# Patient Record
Sex: FEMALE | Race: WHITE | NOT HISPANIC OR LATINO | Employment: OTHER | URBAN - NONMETROPOLITAN AREA
[De-identification: names, ages, dates, MRNs, and addresses within clinical notes are randomized per-mention and may not be internally consistent; named-entity substitution may affect disease eponyms.]

---

## 2017-01-28 ENCOUNTER — COMMUNICATION - GICH (OUTPATIENT)
Dept: FAMILY MEDICINE | Facility: OTHER | Age: 42
End: 2017-01-28

## 2017-01-28 DIAGNOSIS — J30.1 ALLERGIC RHINITIS DUE TO POLLEN: ICD-10-CM

## 2017-02-20 ENCOUNTER — COMMUNICATION - GICH (OUTPATIENT)
Dept: FAMILY MEDICINE | Facility: OTHER | Age: 42
End: 2017-02-20

## 2017-02-20 DIAGNOSIS — J30.1 ALLERGIC RHINITIS DUE TO POLLEN: ICD-10-CM

## 2017-02-21 ENCOUNTER — COMMUNICATION - GICH (OUTPATIENT)
Dept: FAMILY MEDICINE | Facility: OTHER | Age: 42
End: 2017-02-21

## 2017-02-21 DIAGNOSIS — J30.1 ALLERGIC RHINITIS DUE TO POLLEN: ICD-10-CM

## 2017-02-22 ENCOUNTER — COMMUNICATION - GICH (OUTPATIENT)
Dept: FAMILY MEDICINE | Facility: OTHER | Age: 42
End: 2017-02-22

## 2017-02-22 DIAGNOSIS — J30.1 ALLERGIC RHINITIS DUE TO POLLEN: ICD-10-CM

## 2017-04-20 ENCOUNTER — COMMUNICATION - GICH (OUTPATIENT)
Dept: FAMILY MEDICINE | Facility: OTHER | Age: 42
End: 2017-04-20

## 2017-04-20 DIAGNOSIS — F41.9 ANXIETY DISORDER: ICD-10-CM

## 2017-05-18 ENCOUNTER — COMMUNICATION - GICH (OUTPATIENT)
Dept: FAMILY MEDICINE | Facility: OTHER | Age: 42
End: 2017-05-18

## 2017-05-18 DIAGNOSIS — J30.1 ALLERGIC RHINITIS DUE TO POLLEN: ICD-10-CM

## 2017-05-23 ENCOUNTER — OFFICE VISIT - GICH (OUTPATIENT)
Dept: FAMILY MEDICINE | Facility: OTHER | Age: 42
End: 2017-05-23

## 2017-05-23 ENCOUNTER — HISTORY (OUTPATIENT)
Dept: FAMILY MEDICINE | Facility: OTHER | Age: 42
End: 2017-05-23

## 2017-05-23 DIAGNOSIS — F33.41 RECURRENT MAJOR DEPRESSIVE DISORDER IN PARTIAL REMISSION (H): ICD-10-CM

## 2017-05-23 DIAGNOSIS — M62.89 OTHER SPECIFIED DISORDERS OF MUSCLE (CODE): ICD-10-CM

## 2017-05-23 DIAGNOSIS — F41.9 ANXIETY DISORDER: ICD-10-CM

## 2017-05-23 DIAGNOSIS — F41.1 GENERALIZED ANXIETY DISORDER: ICD-10-CM

## 2017-05-23 DIAGNOSIS — K59.09 OTHER CONSTIPATION: ICD-10-CM

## 2017-05-23 DIAGNOSIS — Z00.00 ENCOUNTER FOR GENERAL ADULT MEDICAL EXAMINATION WITHOUT ABNORMAL FINDINGS: ICD-10-CM

## 2017-05-23 DIAGNOSIS — J30.1 ALLERGIC RHINITIS DUE TO POLLEN: ICD-10-CM

## 2017-05-23 ASSESSMENT — ANXIETY QUESTIONNAIRES
3. WORRYING TOO MUCH ABOUT DIFFERENT THINGS: NOT AT ALL
4. TROUBLE RELAXING: NOT AT ALL
5. BEING SO RESTLESS THAT IT IS HARD TO SIT STILL: NOT AT ALL
2. NOT BEING ABLE TO STOP OR CONTROL WORRYING: NOT AT ALL
7. FEELING AFRAID AS IF SOMETHING AWFUL MIGHT HAPPEN: NOT AT ALL
6. BECOMING EASILY ANNOYED OR IRRITABLE: SEVERAL DAYS
1. FEELING NERVOUS, ANXIOUS, OR ON EDGE: SEVERAL DAYS
GAD7 TOTAL SCORE: 2

## 2017-05-23 ASSESSMENT — PATIENT HEALTH QUESTIONNAIRE - PHQ9: SUM OF ALL RESPONSES TO PHQ QUESTIONS 1-9: 1

## 2017-08-04 ENCOUNTER — AMBULATORY - GICH (OUTPATIENT)
Dept: SCHEDULING | Facility: OTHER | Age: 42
End: 2017-08-04

## 2017-08-07 ENCOUNTER — COMMUNICATION - GICH (OUTPATIENT)
Dept: FAMILY MEDICINE | Facility: OTHER | Age: 42
End: 2017-08-07

## 2017-08-08 ENCOUNTER — AMBULATORY - GICH (OUTPATIENT)
Dept: FAMILY MEDICINE | Facility: OTHER | Age: 42
End: 2017-08-08

## 2017-08-09 ENCOUNTER — HISTORY (OUTPATIENT)
Dept: FAMILY MEDICINE | Facility: OTHER | Age: 42
End: 2017-08-09

## 2017-08-09 ENCOUNTER — OFFICE VISIT - GICH (OUTPATIENT)
Dept: FAMILY MEDICINE | Facility: OTHER | Age: 42
End: 2017-08-09

## 2017-08-09 DIAGNOSIS — M62.838 OTHER MUSCLE SPASM: ICD-10-CM

## 2017-08-09 DIAGNOSIS — N81.5 VAGINAL ENTEROCELE: ICD-10-CM

## 2017-08-09 DIAGNOSIS — R10.32 LEFT LOWER QUADRANT PAIN: ICD-10-CM

## 2017-08-09 DIAGNOSIS — N81.6 RECTOCELE: ICD-10-CM

## 2017-08-09 ASSESSMENT — ANXIETY QUESTIONNAIRES
7. FEELING AFRAID AS IF SOMETHING AWFUL MIGHT HAPPEN: NOT AT ALL
GAD7 TOTAL SCORE: 0
2. NOT BEING ABLE TO STOP OR CONTROL WORRYING: NOT AT ALL
3. WORRYING TOO MUCH ABOUT DIFFERENT THINGS: NOT AT ALL
5. BEING SO RESTLESS THAT IT IS HARD TO SIT STILL: NOT AT ALL
6. BECOMING EASILY ANNOYED OR IRRITABLE: NOT AT ALL
4. TROUBLE RELAXING: NOT AT ALL
1. FEELING NERVOUS, ANXIOUS, OR ON EDGE: NOT AT ALL

## 2017-08-16 ENCOUNTER — HOSPITAL ENCOUNTER (OUTPATIENT)
Dept: RADIOLOGY | Facility: OTHER | Age: 42
End: 2017-08-16
Attending: FAMILY MEDICINE

## 2017-08-16 DIAGNOSIS — R10.32 LEFT LOWER QUADRANT PAIN: ICD-10-CM

## 2017-08-22 ENCOUNTER — AMBULATORY - GICH (OUTPATIENT)
Dept: FAMILY MEDICINE | Facility: OTHER | Age: 42
End: 2017-08-22

## 2017-08-26 ENCOUNTER — HISTORY (OUTPATIENT)
Dept: EMERGENCY MEDICINE | Facility: OTHER | Age: 42
End: 2017-08-26

## 2017-08-30 ENCOUNTER — AMBULATORY - GICH (OUTPATIENT)
Dept: FAMILY MEDICINE | Facility: OTHER | Age: 42
End: 2017-08-30

## 2017-09-19 ENCOUNTER — OFFICE VISIT - GICH (OUTPATIENT)
Dept: OBGYN | Facility: OTHER | Age: 42
End: 2017-09-19

## 2017-09-19 ENCOUNTER — HISTORY (OUTPATIENT)
Dept: OBGYN | Facility: OTHER | Age: 42
End: 2017-09-19

## 2017-09-19 DIAGNOSIS — N81.5 VAGINAL ENTEROCELE: ICD-10-CM

## 2017-09-19 DIAGNOSIS — N81.6 RECTOCELE: ICD-10-CM

## 2017-10-23 ENCOUNTER — COMMUNICATION - GICH (OUTPATIENT)
Dept: FAMILY MEDICINE | Facility: OTHER | Age: 42
End: 2017-10-23

## 2017-10-23 ENCOUNTER — HISTORY (OUTPATIENT)
Dept: FAMILY MEDICINE | Facility: OTHER | Age: 42
End: 2017-10-23

## 2017-10-23 ENCOUNTER — OFFICE VISIT - GICH (OUTPATIENT)
Dept: FAMILY MEDICINE | Facility: OTHER | Age: 42
End: 2017-10-23

## 2017-10-23 DIAGNOSIS — N81.10 CYSTOCELE, UNSPECIFIED (CODE): ICD-10-CM

## 2017-10-23 DIAGNOSIS — J30.1 ALLERGIC RHINITIS DUE TO POLLEN: ICD-10-CM

## 2017-10-23 DIAGNOSIS — N81.6 RECTOCELE: ICD-10-CM

## 2017-10-23 DIAGNOSIS — Z23 ENCOUNTER FOR IMMUNIZATION: ICD-10-CM

## 2017-10-23 DIAGNOSIS — F17.200 NICOTINE DEPENDENCE, UNCOMPLICATED: ICD-10-CM

## 2017-10-23 DIAGNOSIS — F41.1 GENERALIZED ANXIETY DISORDER: ICD-10-CM

## 2017-10-23 DIAGNOSIS — Z01.818 ENCOUNTER FOR OTHER PREPROCEDURAL EXAMINATION: ICD-10-CM

## 2017-10-23 LAB
A/G RATIO - HISTORICAL: 1.3 (ref 1–2)
ABSOLUTE BASOPHILS - HISTORICAL: 0.1 THOU/CU MM
ABSOLUTE EOSINOPHILS - HISTORICAL: 0.1 THOU/CU MM
ABSOLUTE IMMATURE GRANULOCYTES(METAS,MYELOS,PROS) - HISTORICAL: 0 THOU/CU MM
ABSOLUTE LYMPHOCYTES - HISTORICAL: 2.9 THOU/CU MM (ref 0.9–2.9)
ABSOLUTE MONOCYTES - HISTORICAL: 0.4 THOU/CU MM
ABSOLUTE NEUTROPHILS - HISTORICAL: 3.5 THOU/CU MM (ref 1.7–7)
ALBUMIN SERPL-MCNC: 4.3 G/DL (ref 3.5–5.7)
ALP SERPL-CCNC: 68 IU/L (ref 34–104)
ALT (SGPT) - HISTORICAL: 20 IU/L (ref 7–52)
ANION GAP - HISTORICAL: 8 (ref 5–18)
AST SERPL-CCNC: 24 IU/L (ref 13–39)
BASOPHILS # BLD AUTO: 0.7 %
BILIRUB SERPL-MCNC: 0.3 MG/DL (ref 0.3–1)
BILIRUB UR QL: NEGATIVE
BUN SERPL-MCNC: 9 MG/DL (ref 7–25)
BUN/CREAT RATIO - HISTORICAL: 12
CALCIUM SERPL-MCNC: 9.5 MG/DL (ref 8.6–10.3)
CHLORIDE SERPLBLD-SCNC: 104 MMOL/L (ref 98–107)
CLARITY, URINE: CLEAR CLARITY
CO2 SERPL-SCNC: 25 MMOL/L (ref 21–31)
COLOR UR: YELLOW COLOR
CREAT SERPL-MCNC: 0.76 MG/DL (ref 0.7–1.3)
EOSINOPHIL NFR BLD AUTO: 1.7 %
ERYTHROCYTE [DISTWIDTH] IN BLOOD BY AUTOMATED COUNT: 11.9 % (ref 11.5–15.5)
GFR IF NOT AFRICAN AMERICAN - HISTORICAL: >60 ML/MIN/1.73M2
GLOBULIN - HISTORICAL: 3.3 G/DL (ref 2–3.7)
GLUCOSE SERPL-MCNC: 90 MG/DL (ref 70–105)
GLUCOSE URINE: NEGATIVE MG/DL
HCT VFR BLD AUTO: 37.8 % (ref 33–51)
HEMOGLOBIN: 12.8 G/DL (ref 12–16)
IMMATURE GRANULOCYTES(METAS,MYELOS,PROS) - HISTORICAL: 0.3 %
KETONES UR QL: NEGATIVE MG/DL
LEUKOCYTE ESTERASE URINE: NEGATIVE
LYMPHOCYTES NFR BLD AUTO: 41.6 % (ref 20–44)
MCH RBC QN AUTO: 32.6 PG (ref 26–34)
MCHC RBC AUTO-ENTMCNC: 33.9 G/DL (ref 32–36)
MCV RBC AUTO: 96 FL (ref 80–100)
MONOCYTES NFR BLD AUTO: 5.6 %
NEUTROPHILS NFR BLD AUTO: 50.1 % (ref 42–72)
NITRITE UR QL STRIP: NEGATIVE
OCCULT BLOOD,URINE - HISTORICAL: NEGATIVE
PH UR: 8 [PH]
PLATELET # BLD AUTO: 318 THOU/CU MM (ref 140–440)
PMV BLD: 9 FL (ref 6.5–11)
POTASSIUM SERPL-SCNC: 4.2 MMOL/L (ref 3.5–5.1)
PROT SERPL-MCNC: 7.6 G/DL (ref 6.4–8.9)
PROTEIN QUALITATIVE,URINE - HISTORICAL: NEGATIVE MG/DL
RED BLOOD COUNT - HISTORICAL: 3.93 MIL/CU MM (ref 4–5.2)
SODIUM SERPL-SCNC: 137 MMOL/L (ref 133–143)
SP GR UR STRIP: 1.01
UROBILINOGEN,QUALITATIVE - HISTORICAL: NORMAL EU/DL
WHITE BLOOD COUNT - HISTORICAL: 7 THOU/CU MM (ref 4.5–11)

## 2017-10-23 ASSESSMENT — PATIENT HEALTH QUESTIONNAIRE - PHQ9: SUM OF ALL RESPONSES TO PHQ QUESTIONS 1-9: 0

## 2017-10-25 ENCOUNTER — HISTORY (OUTPATIENT)
Dept: SURGERY | Facility: OTHER | Age: 42
End: 2017-10-25

## 2017-11-01 ENCOUNTER — HOSPITAL ENCOUNTER (OUTPATIENT)
Dept: MEDSURG UNIT | Facility: OTHER | Age: 42
Discharge: HOME | End: 2017-11-02
Attending: OBSTETRICS & GYNECOLOGY | Admitting: OBSTETRICS & GYNECOLOGY

## 2017-11-01 ENCOUNTER — HISTORY (OUTPATIENT)
Dept: SURGERY | Facility: OTHER | Age: 42
End: 2017-11-01

## 2017-11-01 ENCOUNTER — SURGERY (OUTPATIENT)
Dept: SURGERY | Facility: OTHER | Age: 42
End: 2017-11-01

## 2017-11-01 DIAGNOSIS — Z86.718 PERSONAL HISTORY OF OTHER VENOUS THROMBOSIS AND EMBOLISM (CODE): ICD-10-CM

## 2017-11-01 DIAGNOSIS — N81.89 OTHER FEMALE GENITAL PROLAPSE: ICD-10-CM

## 2017-11-01 LAB
ABORH - HISTORICAL: NORMAL
ANTIBODY SCREEN - HISTORICAL: NEGATIVE
HEMOGLOBIN: 12.2 G/DL (ref 12–16)
MCV RBC AUTO: 96 FL (ref 80–100)
SPECIMEN EXPIRATION DATE/TIME - HISTORICAL: NORMAL

## 2017-11-02 LAB
HEMOGLOBIN: 10 G/DL (ref 12–16)
MCV RBC AUTO: 97 FL (ref 80–100)

## 2017-11-18 ENCOUNTER — COMMUNICATION - GICH (OUTPATIENT)
Dept: FAMILY MEDICINE | Facility: OTHER | Age: 42
End: 2017-11-18

## 2017-11-18 DIAGNOSIS — J30.1 ALLERGIC RHINITIS DUE TO POLLEN: ICD-10-CM

## 2017-11-21 ENCOUNTER — OFFICE VISIT - GICH (OUTPATIENT)
Dept: OBGYN | Facility: OTHER | Age: 42
End: 2017-11-21

## 2017-11-21 ENCOUNTER — HISTORY (OUTPATIENT)
Dept: OBGYN | Facility: OTHER | Age: 42
End: 2017-11-21

## 2017-11-21 DIAGNOSIS — Z09 ENCOUNTER FOR FOLLOW-UP EXAMINATION AFTER COMPLETED TREATMENT FOR CONDITIONS OTHER THAN MALIGNANT NEOPLASM: ICD-10-CM

## 2017-12-19 ENCOUNTER — OFFICE VISIT - GICH (OUTPATIENT)
Dept: OBGYN | Facility: OTHER | Age: 42
End: 2017-12-19

## 2017-12-19 ENCOUNTER — HISTORY (OUTPATIENT)
Dept: OBGYN | Facility: OTHER | Age: 42
End: 2017-12-19

## 2017-12-19 DIAGNOSIS — Z09 ENCOUNTER FOR FOLLOW-UP EXAMINATION AFTER COMPLETED TREATMENT FOR CONDITIONS OTHER THAN MALIGNANT NEOPLASM: ICD-10-CM

## 2017-12-27 NOTE — PROGRESS NOTES
Patient Information     Patient Name MRN Sex Radha Ricci [id number] Female 1975      Progress Notes by Shaun Mc MD at 2017  3:30 PM     Author:  Shaun Mc MD Service:  (none) Author Type:  Physician     Filed:  2017  4:51 PM Encounter Date:  2017 Status:  Signed     :  Shaun Mc MD (Physician)            Radha Ruiz presents today for a postop checkup at 2 weeks after vaginal repairs/enterocoele.    S: Bowels and bladder are functioning normally, no abnormal bleeding or pain.     Current Outpatient Prescriptions on File Prior to Visit       Medication  Sig Dispense Refill     cetirizine-pseudoephedrine, 5-120 mg, (ZYRTEC-D) 5-120 mg tablet TAKE 1 TABLET BY MOUTH TWICE DAILY 48 tablet 5     enoxaparin (LOVENOX) 40 mg/0.4 mL injection Inject 40 mg subcutaneous once daily. 40 Syringe 0     fluticasone (50 mcg per actuation) nasal solution (FLONASE) INHALE 2 SPRAYS INTO BOTH NOSTRILS ONCE DAILY. 3 Bottle 11     ibuprofen (ADVIL; MOTRIN) 200 mg tablet Take 1-3 tablets by mouth every 6 hours if needed for Pain. 100 tablet 0     linaclotide (LINZESS) 145 mcg cap capsule Take 1 capsule by mouth before breakfast.  0     traZODone (DESYREL) 100 mg tablet TAKE 3-4 TABLETS BY MOUTH AT BEDTIME. 360 tablet 3     venlafaxine (EFFEXOR XR) 150 mg Extended-Release capsule Take 1 capsule by mouth once daily with a meal. 90 capsule 3     No current facility-administered medications on file prior to visit.      Allergies     Allergen  Reactions     Sulfa (Sulfonamide Antibiotics) Hives     Past Medical History:     Diagnosis  Date     History of blood transfusion 10/00    History of blood transfusion with       Hx of pregnancy      2, para 1-0-1-1      Pulmonary emboli (HC)     History of pulmonary emboli after       Routine gynecological examination 08    Satisfactory GYN examination       Past Surgical History:      Procedure  Laterality Date       SECTION  10/00 /06    History of blood transfusion with        COLONOSCOPY DIAGNOSTIC  ,,    F/U 2019       CRYOTHERAPY OF CERVIX  1996    Cryosurgery of the cervix        [facility]      Ney Hart MD [facility]       OR COMBO ANT/POST COLPORR W ENTEROCELE  2017            TUBAL LIGATION  2006       COMPLETE REVIEW OF SYSTEMS: see HPI        O: /68  Temp 98.5  F (36.9  C) (Tympanic)   Wt 73.5 kg (162 lb)  LMP 10/11/2012  BMI 29.63 kg/m2 Body mass index is 29.63 kg/(m^2).    EXAM:  General Appearance: Pleasant, alert, appropriate appearance for age. No acute distress      I/P:  Stable postop.  Recheck in one month.    Based on what occurred in the visit today:  Previous medication(s) were discontinued or altered? No  Previous medication(s) were suspended pending consultation? No  New medication(s) started? No        Shaun Mc MD FACOG  4:50 PM 2017

## 2017-12-27 NOTE — PROGRESS NOTES
Patient Information     Patient Name MRN Sex Radha Ricci [id number] Female 1975      Progress Notes by Zari Torres RT at 2017  3:02 PM     Author:  Zari Torres RT Service:  (none) Author Type:  RT- Respiratory Therapist     Filed:  2017  3:02 PM Date of Service:  2017  3:02 PM Status:  Signed     :  Zari Torres RT (RT- Respiratory Therapist)            Radha Ruiz WAS INSTRUCTED ON THE USE OF IS TODAY.  PATIENT ACHIEVED 1500 MLS OUT OF THE PREDICTED 2050 MLS BASED ON AGE AND HEIGHT.  PATIENT WILL USE IS, 10 BREATHS EVERY HOUR WHILE AWAKE FOLLOWED BY A STRONG COUGH TO KEEP LUNGS OPEN AND CLEAR WHILE HERE IN THE HOSPITAL.  PATIENT WAS ALSO INSTRUCTED TO SPLINT THEIR INCISION IF INDICATED.  PATIENT WILL CONTINUE TO USE IS UNTIL ABLE TO RESUME NORMAL DAILY ACTIVITIES.

## 2017-12-27 NOTE — PROGRESS NOTES
Patient Information     Patient Name MRN Sex     Radha Ruiz [id number] Female 1975      Progress Notes by Rashad Adhikari MD at 2017  4:00 PM     Author:  Rashad Adhikari MD Service:  (none) Author Type:  Physician     Filed:  2017  6:01 PM Encounter Date:  2017 Status:  Signed     :  Rashad Adhikari MD (Physician)            There are no exam notes on file for this visit.    SUBJECTIVE:  Radha Ruiz  is a 42 y.o. female who comes in today for follow-up after being seen at [facility]. We referred her because of ongoing troubles with stooling and pelvic floor dysfunction. We've not yet received the records from them. They talked about doing a trigger point injection with the guidance of radiology but they didn't want to stay there for that and talked about doing that here through [facility].    She has a significant enterocele and rectocele.  She was told she needs surgery.    Past Medical, Family, and Social History reviewed and updated as noted below.   ROS is negative except as noted above       Allergies     Allergen  Reactions     Sulfa (Sulfonamide Antibiotics) Hives   ,   Family History       Problem   Relation Age of Onset     Cancer-colon  Father 38     Colon cancer       Psychiatric illness  Mother      Untreated anxiety       Other  Mother      D&C for postmenopausal bleeding benign       Cancer  Maternal Grandfather      Brain       Heart Disease  Maternal Grandfather      MI       Hypertension  Maternal Grandfather      Other  Maternal Uncle      Hemochromatosis       Hypertension  Maternal Grandmother      Other  Maternal Grandmother      Alzheimer's       Diabetes  Other      Diabetes        Heart Disease  Paternal Grandmother      CHF       Cancer  Maternal Uncle      cancer all over.         Cancer  Paternal Uncle      Lung cancer       Cancer-breast  No Family History    ,   Current Outpatient Prescriptions on File Prior to Visit       Medication  Sig Dispense Refill      cetirizine-pseudoephedrine, 5-120 mg, (ZYRTEC-D) 5-120 mg tablet Take 1 tablet by mouth 2 times daily. 48 tablet 5     fluticasone (50 mcg per actuation) nasal solution (FLONASE) INHALE 2 SPRAYS INTO BOTH NOSTRILS ONCE DAILY. 3 Bottle 11     traZODone (DESYREL) 100 mg tablet TAKE 3-4 TABLETS BY MOUTH AT BEDTIME. 360 tablet 3     venlafaxine (EFFEXOR XR) 150 mg Extended-Release capsule Take 1 capsule by mouth once daily with a meal. 90 capsule 3     No current facility-administered medications on file prior to visit.    ,   Past Medical History:     Diagnosis  Date     History of blood transfusion 10/00    History of blood transfusion with       Hx of pregnancy      2, para 1-0-1-1      Pulmonary emboli (HC)     History of pulmonary emboli after       Routine gynecological examination 08    Satisfactory GYN examination     ,   Patient Active Problem List       Diagnosis  Date Noted     Surgical menopause  2015     Melanosis coli  10/06/2014     H/O adenomatous polyp of colon  10/06/2014     FH: colon cancer  2014     Menorrhagia  10/17/2012     TINEA VERSICOLOR  2012     CONSTIPATION       CIGARETTE SMOKER       ANXIETY       ALLERGIC RHINITIS, SEASONAL       MIGRAINE HEADACHE       RESTLESS LEG SYNDROME       PREMENSTRUAL DYSPHORIC SYNDROME       Depression, major, recurrent, in partial remission (HC)  2007     PHQ-9 score 19-3 on 07.        ,   Past Surgical History:      Procedure  Laterality Date      SECTION  10/00    History of blood transfusion with        COLONOSCOPY DIAGNOSTIC  ,,    F/U 2019       CRYOTHERAPY OF CERVIX      Cryosurgery of the cervix        [facility]      [facility]       TUBAL LIGATION  2006    and   Social History        Substance Use Topics          Smoking status:   Current Every Day Smoker      Packs/day:  0.13      Types:  Cigarettes      Smokeless tobacco:   Never Used       "Alcohol use   Yes      Comment: very rare       OBJECTIVE:  /86  Pulse 94  Ht 1.549 m (5' 1\")  Wt 69.9 kg (154 lb)  LMP 10/11/2012  Breastfeeding? No  BMI 29.1 kg/m2   EXAM:  Alert and cooperative, no distress. Exam was not repeated today. She did show me her records from [facility] as well as some of the photos of her MRI study. She has a tender trigger point on her left lower quadrant.  ASSESSMENT/Plan :      Radha was seen today for referral.    Diagnoses and all orders for this visit:    Acquired pelvic enterocele  -     AMB CONSULT TO GYNECOLOGY; Future    Rectocele  -     AMB CONSULT TO GYNECOLOGY; Future    Abdominal wall pain in left lower quadrant  -     XR INJ TRIGGER POINT EQUAL OR LESS 2; Future    Muscle spasm  -     tiZANidine (ZANAFLEX) 4 mg tablet; Take 1 tablet by mouth every 6 hours if needed for Muscle Spasm.      referred for trigger point injection under imaging guidance of the left lower quadrant abdominal tender spot.    She would like to see Dr. Ney Hart for gynecology at [facility] who did her hysterectomy with regard to possible enterocele/rectocele repair.    Trial of Zanaflex to see if that will help with some of her muscle spasm and help her to be able to sleep.    A total of 25 minutes was spent with the patient, greater than 50% of the time was spent in counseling/discussion of the aforementioned concerns.     Rashad Adhikari MD            "

## 2017-12-27 NOTE — PROGRESS NOTES
Patient Information     Patient Name MRN Sex Radha Brantley [id number] Female 1975      Progress Notes by Shaun Mc MD at 2017  2:45 PM     Author:  Shaun Mc MD Service:  (none) Author Type:  Physician     Filed:  2017 10:01 PM Encounter Date:  2017 Status:  Signed     :  Shaun Mc MD (Physician)            SUBJECTIVE:    Radha Ruiz is a 42 y.o. female who presents for evaluation of pelvic relaxation.    HPI  Review of her medical and personal history reveals longstanding history of left pelvic and abdominal pain, and chronic issues with constipation. She recently underwent workup at [facility] in [place] with defecatory MRI and diagnosed with enterocele and rectocele, and suggested she be seen for evaluation and consideration of repair by GYN or Colorectal surgery.  She has problems getting empty both with defecation and urination. Denies leakage of either. Her primary concern is her abdominal pain. She has already tried acupuncture and chiropractic without relief. She denies a pelvic bulge or painful intercourse. She is present with her  today as well. She had a hysterectomy and BSO for pelvic pain in [place] in . This was done robotically with closure of her vaginal cuff vaginally. SHe had history of PE with one of her deliveries. She had two cesareans in the past.    Allergies     Allergen  Reactions     Sulfa (Sulfonamide Antibiotics) Hives   ,   Family History       Problem   Relation Age of Onset     Cancer-colon  Father 38     Colon cancer       Psychiatric illness  Mother      Untreated anxiety       Other  Mother      D&C for postmenopausal bleeding benign       Cancer  Maternal Grandfather      Brain       Heart Disease  Maternal Grandfather      MI       Hypertension  Maternal Grandfather      Other  Maternal Uncle      Hemochromatosis       Hypertension  Maternal Grandmother      Other  Maternal Grandmother      Alzheimer's        Diabetes  Other      Diabetes        Heart Disease  Paternal Grandmother      CHF       Cancer  Maternal Uncle      cancer all over.         Cancer  Paternal Uncle      Lung cancer       Cancer-breast  No Family History    ,   Current Outpatient Prescriptions on File Prior to Visit       Medication  Sig Dispense Refill     cetirizine-pseudoephedrine, 5-120 mg, (ZYRTEC-D) 5-120 mg tablet Take 1 tablet by mouth 2 times daily. 48 tablet 5     fluticasone (50 mcg per actuation) nasal solution (FLONASE) INHALE 2 SPRAYS INTO BOTH NOSTRILS ONCE DAILY. 3 Bottle 11     linaclotide (LINZESS) 145 mcg cap capsule Take 1 capsule by mouth before breakfast.  0     methocarbamol (ROBAXIN) 500 mg tablet Take 1-2 tablets by mouth every 6 hours if needed (For Muscle Spasms). 60 tablet 1     tiZANidine (ZANAFLEX) 4 mg tablet Take 1 tablet by mouth every 6 hours if needed for Muscle Spasm. 30 tablet 1     traZODone (DESYREL) 100 mg tablet TAKE 3-4 TABLETS BY MOUTH AT BEDTIME. 360 tablet 3     venlafaxine (EFFEXOR XR) 150 mg Extended-Release capsule Take 1 capsule by mouth once daily with a meal. 90 capsule 3     No current facility-administered medications on file prior to visit.    ,   Past Medical History:     Diagnosis  Date     History of blood transfusion 10/00    History of blood transfusion with       Hx of pregnancy      2, para 1-0-1-1      Pulmonary emboli (HC)     History of pulmonary emboli after       Routine gynecological examination 08    Satisfactory GYN examination     ,   Past Surgical History:      Procedure  Laterality Date      SECTION  10/00    History of blood transfusion with        COLONOSCOPY DIAGNOSTIC  ,,    F/U 2019       CRYOTHERAPY OF CERVIX  1996    Cryosurgery of the cervix        [facility]      [facility]       TUBAL LIGATION  2006    and   Social History        Substance Use Topics          Smoking status:   Current Every Day  "Smoker      Packs/day:  0.13      Types:  Cigarettes      Smokeless tobacco:   Never Used      Alcohol use   Yes      Comment: very rare         REVIEW OF SYSTEMS:  Review of Systems   All other systems reviewed and are negative.      OBJECTIVE:  /82  Pulse 82  Ht 1.549 m (5' 1\")  Wt 72.8 kg (160 lb 9.6 oz)  LMP 10/11/2012  Breastfeeding? No  BMI 30.35 kg/m2    EXAM:   Physical Exam   Constitutional: She is well-developed, well-nourished, and in no distress.   Abdominal: Soft. She exhibits no distension. There is no tenderness.   Genitourinary:   Genitourinary Comments: She has a well supported vaginal cuff.  She does have bulging of the perineum with valsalva, G1-2 cystocele and rectocele with valsalva.  Bimanual is non-tender and negative for masses.       ASSESSMENT/PLAN:    ICD-10-CM    1. Acquired pelvic enterocele N81.5 AMB CONSULT TO GYNECOLOGY   2. Rectocele N81.6 AMB CONSULT TO GYNECOLOGY        Plan:  Cystocele and Rectocele.  Discussed reasonable expectations of easier evacuation of bowels, but not necessarily fixing her pain symptoms or bowel mobility with surgical repair.  Will need to address the perineal enterocele with endopelvic obliteration of the cul-de-sac with rectocele repair.  Discussed risks, benefits and alternatives. Discussed referral [facility] at [facility], also discussed trial of pessary.  She discussed with her  and would like to proceed with vaginal repairs primarily here. Will refer to Dr. Adhikari for preop evaluation with her history of PE. Plan for prophylactic lovenox prior to her procedure and six weeks following. WIll plan on GETA without spinal.    TT: 30 min with over half in discussion of her treatment options.    Shaun Mc MD FACOG  10:00 PM 9/19/2017           "

## 2017-12-27 NOTE — PROGRESS NOTES
Patient Information     Patient Name MRN Sex Radha Ricci [id number] Female 1975      Progress Notes by Kerry Shaikh RN at 2017 12:04 PM     Author:  Kerry Shaikh RN Service:  (none) Author Type:  NURS- Registered Nurse     Filed:  2017 12:06 PM Date of Service:  2017 12:04 PM Status:  Signed     :  Kerry Shaikh RN (NURS- Registered Nurse)            Discharge Note    Data:  Radha Ruiz has been discharged home at 1130 via ambulatory accompanied by Registered Nurse and Family.      Action:  Written discharge/follow-up instructions were provided to patient. Prescriptions were written and sent with patient and were sent to patients pharmacy.  Belongings sent with patient. Medications from home sent with patient/family: Not Applicable  Equipment none .     Response:  Patient verbalized understanding of discharge instructions, reason for discharge, and necessary follow-up appointments.

## 2017-12-28 NOTE — PROCEDURES
Patient Information     Patient Name MRN Sex Radha Ricci [id number] Female 1975      Procedures by Shaun Mc MD at 2017  9:18 AM     Author:  Shaun Mc MD Service:  (none) Author Type:  Physician     Filed:  2017  9:24 AM Date of Service:  2017  9:18 AM Status:  Signed     :  Shaun Mc MD (Physician)        Pre-procedure Diagnoses:    1. Pelvic relaxation [N81.89]           Procedures:    1. MO COMBO ANT/POST COLPORR W ENTEROCELE [43715.0]               Gynecological Procedure Note  Preoperative Diagnosis:  Enterocele, cystocele, rectocele  Postoperative Diagnosis:  Same  Procedure:  Vaginal repair of enterocele, cystocele, rectocele  Surgeon:  Jesusita  1st Assist: Dr. Hart who was requested due to inavailability of another qualified assistant for help with hemostasis and visualization  Anesthesia:  General Block  Findings:  Vaginal vault prolapse, normal ureteral orifices and bladder post repair.   Description of Operative Procedure: After consent was obtained the patient was taken to the OR suite and placed under general anesthetic. She was prepped and draped in sterile fashion in dorsal lithotomy position in candy cane stirrups. After appropriate timeout procedure, a weighted speculum was placed in the posterior vaginal fornix. Right angle retractors were placed in the anterior and lateral vaginal fornices. The vaginal cuff was grasped with Allis clamps and injected with 0.5% marcaine with dilute epinephrine circumfrentially. The vaginal mucosa was sharply incised. The anterior vesicopubic fascia was sharply dissected, and the peritoneum identified and divided. Intraabdominal position was confirmed. A pack was placed in the abdomen. The enterocele sac was identified and excised with sharp dissection and cautery. Two internal and an external Lees's culdeplasty stitch was placed plicating the posterior peritoneum and both uterosacral ligaments with the free  ends being brought out through the vaginal mucosa.  The pack was removed.  The vaginal cuff was reapproximated with O Vicryl in an interrupted fashion. Hemostasis was excellent.    We then proceeded with anterior and posterior repairs. The mucosa was injected with 0.5% marcaine with dilute epinephrine. The vaginal mucosa was sharply dissected off of the underlying fascia anteriorly. The cystocele was plicated with 2-0 Vicryl. The excess vaginal mucosa was trimmed and the vaginal mucosa was closed in an interrupted fashion. The posterior mucosa was in like fashion dissected off the underlying endopelvic fascia. The mucosa was trimmed and closed with interrupted 2-0 vicryl plicating the rectocele and closing the vaginal mucosa. Cystoscopy was performed and both ureteral orifices were seen effluxing urine. A Saenz catheter was placed in the urethra with clear urine returning. A vaginal pack was placed. The patient was returned to the supine position and awakened from her anesthetic. She was taken to the PACU in stable condition. There were no complications.   EBL: 100 ml.   Specimen: None    Shaun Mc MD FACOG  9:22 AM 11/1/2017

## 2017-12-28 NOTE — PROGRESS NOTES
Patient Information     Patient Name MRN Sex Radha Ricci [id number] Female 1975      Progress Notes by Haylie Mott RN at 2017 11:20 AM     Author:  Haylie Mott RN Service:  (none) Author Type:  NURS- Registered Nurse     Filed:  2017 12:51 PM Date of Service:  2017 11:20 AM Status:  Signed     :  Haylie Mott RN (NURS- Registered Nurse)            Admission Note    Data:  Radha Ruiz admitted to [facility] room 333 from PACU via cart.      Action:  Family and Dr. Mc have been notified of admission.      Response:  Patient tolerated transfer. and Patient is stable.     Haylie Mott RN ....................  2017   1120 PM

## 2017-12-28 NOTE — PROGRESS NOTES
Patient Information     Patient Name MRN Sex Radha Ricci [id number] Female 1975      Progress Notes by Rukhsana Hoffman RN at 2017  4:43 AM     Author:  Rukhsana Hoffman RN Service:  (none) Author Type:  NURS- Registered Nurse     Filed:  2017  4:48 AM Date of Service:  2017  4:43 AM Status:  Signed     :  Rukhsana Hoffman RN (NURS- Registered Nurse)            Problem: REPRODUCTIVE  Goal: UNDERSTANDING OF REPRODUCTIVE IMPLICATIONS OF DIAGNOSIS OR SURGERY  Outcome: Problem reviewed and goal still appropriate  Small amount of pink discharge on pratik pad this shift.     Rukhsana Hoffman RN ....................  2017   4:41 AM      Problem: PAIN  Goal: VERBALIZES/DISPLAYS ADEQUATE COMFORT LEVEL OR BASELINE COMFORT LEVEL  Outcome: Problem reviewed and goal still appropriate  Patient receiving PRN medications for pain management this shift. Pain rated between 4-8/10 on pain scale at times. Now resting comfortably.    Rukhsana Hoffman RN ....................  2017   4:43 AM        Comments: Called Dr. Mc at 2000 to order home medications per patient request. Received order for trazodone 150 mg at bedtime, effexor 150 mg at HS, and zyrtec HS and in the morning.     Rukhsana Hoffman RN ....................  2017   4:47 AM

## 2017-12-28 NOTE — TELEPHONE ENCOUNTER
Patient Information     Patient Name MRN Radha Reich [id number] Female 1975      Telephone Encounter by Leydi Smart at 2017 11:45 AM     Author:  Leydi Smart Service:  (none) Author Type:  (none)     Filed:  2017 11:45 AM Encounter Date:  2017 Status:  Signed     :  Leydi Smart            The patient has an appointment today with Rashad Adhikari MD.  Leydi Smart LPN..................2017   11:45 AM

## 2017-12-28 NOTE — OR POSTOP
"Patient Information     Patient Name MRN Sex Radha Ricci [id number] Female 1975      OR PostOp by Brijesh Chaudhry RN at 2017 11:01 AM     Author:  Brijesh Chaudhry RN Service:  (none) Author Type:  NURS- Registered Nurse     Filed:  2017 11:02 AM Date of Service:  2017 11:01 AM Status:  Signed     :  Brijesh Chaudhry RN (NURS- Registered Nurse)            PACU Respiratory Event Documentation     1) Episodes of Apnea greater than or equal to 10 seconds: no    2) Bradypnea - less than 8 breaths per minute: 16    3) Pain score on 0 to 10 scale: 5 \"tolerable\"    4) Pain-sedation mismatch (yes or no): no    5) Repeated 02 desaturation less than 90% (yes or no): no    Anesthesia notified? (yes or no): no    Any of the above events occuring repeatedly in separate 30 minute intervals may be considered recurrent PACU respiratory events.    PACU Transfer Note    Radha Ruiz transferred to Med/Surg room 333 via cart.  Equipment used for transport:  None.  Accompanied by:  Registered Nurse    Patient stable and meets phase 1 discharge criteria for transport from PACU.  Report given to MAURICIO Stallings prior to transport to the unit. BRIJESH CHAUDHRY RN ....................  2017   11:02 AM          "

## 2017-12-28 NOTE — OR ANESTHESIA
Patient Information     Patient Name MRN Sex     Radha Ruiz [id number] Female 1975      OR Anesthesia by Derrick Cedillo DO at 2017  7:16 AM     Author:  Derrick Cedillo DO Service:  (none) Author Type:  PHYS- Anesthesiologist     Filed:  2017  7:16 AM Date of Service:  2017  7:16 AM Status:  Signed     :  Derrick Cedillo DO (PHYS- Anesthesiologist)            ANESTHESIAPREOP    PREANESTHETIC EXAM    Radha Ruiz is a 42 y.o. female    /57  Pulse 76  Temp 98.2  F (36.8  C)  Resp 16  LMP 10/11/2012  SpO2 98%  There is no height or weight on file to calculate BMI.    ALLERGIES    Sulfa (sulfonamide antibiotics)    PAST MEDICAL HISTORY    Past Medical History:     Diagnosis  Date     History of blood transfusion 10/00    History of blood transfusion with       Hx of pregnancy      2, para 1-0-1-1      Pulmonary emboli (HC)     History of pulmonary emboli after       Routine gynecological examination 08    Satisfactory GYN examination         Patient Active Problem List     Diagnosis  Code     CIGARETTE SMOKER F17.200     ANXIETY F41.1     ALLERGIC RHINITIS, SEASONAL J30.1     MIGRAINE HEADACHE G43.909     RESTLESS LEG SYNDROME G25.81     Depression, major, recurrent, in partial remission (HC) F33.41     CONSTIPATION K59.00     TINEA VERSICOLOR B36.0     FH: colon cancer Z80.0     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     H/O adenomatous polyp of colon Z86.010     Surgical menopause E89.40       Family History       Problem   Relation Age of Onset     Cancer-colon  Father 38     Colon cancer       Psychiatric illness  Mother      Untreated anxiety       Other  Mother      D&C for postmenopausal bleeding benign       Cancer  Maternal Grandfather      Brain       Heart Disease  Maternal Grandfather      MI       Hypertension  Maternal Grandfather      Other  Maternal Uncle      Hemochromatosis       Hypertension  Maternal Grandmother      Other  Maternal  Grandmother      Alzheimer's       Diabetes  Other      Diabetes        Heart Disease  Paternal Grandmother      CHF       Cancer  Maternal Uncle      cancer all over.         Cancer  Paternal Uncle      Lung cancer       Cancer-breast  No Family History        Past Surgical History:      Procedure  Laterality Date      SECTION  10/00 /4/18/06    History of blood transfusion with        COLONOSCOPY DIAGNOSTIC  ,,    F/U 2019       CRYOTHERAPY OF CERVIX  1996    Cryosurgery of the cervix        [facility]      Ney Hart MD [facility]       TUBAL LIGATION  2006       Major Anesthetic Reactions: none    PMH/PSH Reviewed    History     Smoking Status       Current Every Day Smoker      Packs/day: 0.13     Types: Cigarettes   Smokeless Tobacco       Never Used      History     Alcohol Use       Yes      Comment: very rare       Medications have been reviewed in coordination with proposed intra-procedure medications.    Prescriptions Prior to Admission       Medication  Sig Dispense Refill     cetirizine-pseudoephedrine, 5-120 mg, (ZYRTEC-D) 5-120 mg tablet Take 1 tablet by mouth 2 times daily. 48 tablet 5     fluticasone (50 mcg per actuation) nasal solution (FLONASE) INHALE 2 SPRAYS INTO BOTH NOSTRILS ONCE DAILY. 3 Bottle 11     linaclotide (LINZESS) 145 mcg cap capsule Take 1 capsule by mouth before breakfast.  0     traZODone (DESYREL) 100 mg tablet TAKE 3-4 TABLETS BY MOUTH AT BEDTIME. 360 tablet 3     venlafaxine (EFFEXOR XR) 150 mg Extended-Release capsule Take 1 capsule by mouth once daily with a meal. 90 capsule 3       Recent Labs  Results for orders placed or performed during the hospital encounter of 17       Hemoglobin       Result  Value Ref Range Status    HEMOGLOBIN                12.2 12.0 - 16.0 g/dL Final    MCV                       96 80 - 100 fL Final       NPO Status Noted:  Yes    Airway Class:  2    ASA Physical Status: 2    Anesthetic Plan: GA/ ETT    The  risks, benefits, and alternatives of the procedure were discussed.    PHYSICIAN ELECTRONIC SIGNATURE  Miguel Cedillo DO

## 2017-12-28 NOTE — PROGRESS NOTES
"Patient Information     Patient Name MRN Sex Radha Ricci [id number] Female 1975      Progress Notes by Shaun Mc MD at 2017  7:55 AM     Author:  Shaun Mc MD Service:  (none) Author Type:  Physician     Filed:  2017  7:57 AM Date of Service:  2017  7:55 AM Status:  Signed     :  Shaun cM MD (Physician)            PROGRESS NOTE    SUBJECTIVE:  Pain and PO OK, Vag pack out this AM and now dry on her pad. Saenz is in. Good UOP    OBJECTIVE:  /55  Pulse 77  Temp 97.9  F (36.6  C)  Resp 16  Ht 1.575 m (5' 2\")  Wt 74.1 kg (163 lb 5.8 oz)  LMP 10/11/2012  SpO2 100%  BMI 29.88 kg/m2    Temp (24hrs), Av.4  F (36.3  C), Min:95.5  F (35.3  C), Max:98.5  F (36.9  C)      Patient Vitals for the past 72 hrs:   Weight   17 1124 74.1 kg (163 lb 5.8 oz)     Intake/Output Summary (Last 24 hours) at 17 0756  Last data filed at 17 0600   Gross per 24 hour   Intake             5043 ml   Output             5250 ml   Net             -207 ml       PHYSICAL EXAM:  NAD  Abdomen: NT, ND, soft.  Recent Labs       10/23/17   1620   SODIUM  137   POTASSIUM  4.2   CHLORIDE  104   IK8GYYOA  25   BUN  9   CREATININE  0.76   CALCIUM  9.5     Recent Labs         17   0444  17   0700  10/23/17   1620   ALKPHOSPH   --    --   68   WBC   --    --   7.0   HGB  10.0 L  12.2  12.8   PLT   --    --   318     Active Problems:    Pelvic relaxation    ASSESSMENT & PLAN: Stable  Saenz out, ambulate, adat.  Home if able to void. Check post void residual four hours after removal of cath and call MD with results.    Shaun Mc MD FACOG  7:57 AM 2017           "

## 2017-12-28 NOTE — TELEPHONE ENCOUNTER
Patient Information     Patient Name MRN Sex Radha Ricci [id number] Female 1975      Telephone Encounter by Vance Boyd at 2017  8:32 AM     Author:  Vance Boyd Service:  (none) Author Type:  (none)     Filed:  2017  8:33 AM Encounter Date:  2017 Status:  Signed     :  Vance Boyd            JVC-Pt called and stated she needed to be seen for a couple of medical conditions, he would know. Stated needs to come in sooner than the end of August. Please call pt and advise.   Thank you,  Vance Boyd ....................  2017   8:33 AM

## 2017-12-28 NOTE — OR ANESTHESIA
Patient Information     Patient Name MRN Sex     Radha Ruiz [id number] Female 1975      OR Anesthesia by Derrick Cedillo DO at 2017 12:56 PM     Author:  Derrick Cedillo DO Service:  (none) Author Type:  PHYS- Anesthesiologist     Filed:  2017 12:56 PM Date of Service:  2017 12:56 PM Status:  Signed     :  Derrick Cedillo DO (PHYS- Anesthesiologist)            Anesthesia Post Operative Care Note    Name: Radha Ruiz  MRN:   [id number]  :    1975       Procedure Done:  See Surgeon Note        Anesthesia Technique    Anesthetic Type:  General     Airway Management:  ET Tube     Oral Trauma:  No    Intraoperative Course   Hemodynamics:  Stable    Ventilation Normal:  Yes Lung Sounds:  Normal      PACU Course    Airway Status:  Extubated     Nondepolarizer Used:       Reversed: N/A   Hemodynamics:  Stable      Hydration: Euvolemic   Temperature:  36.1 - 38.3      Mental Status:  Awake, alert, follows commands   Pain Management:  Adequate   Regional Block:  No   Anesthesia Complications:  None      Vital Signs:  Temp: 97.4  F (36.3  C)  Pulse: 82  BP: 108/65  Resp: 16  SpO2: 97 %    O2 Device: Room Air         Level of Nausea: None        Active Lines:  Patient Lines/Drains/Airways Status    Active Line     Name: Placement date: Placement time: Site: Days:    PERIPHERAL VAD Left Forearm 20 17   0705   Forearm   less than 1                Intake & Output:  Date  10/31/17 07 - 17 0659(Not Admitted)    17 07 - 17 0659      Shift  9016-2655 7438-8986 9847-1544 24 Hour Total 0334-7095 6794-8392 2889-1340 24 Hour Total   I  N  T  A  K  E   Intravenous     1000   1000       +I/O+  Maint IV (lactated Ringers infusion)     1000   1000    Shift Total     1000   1000   O  U  T  P  U  T   Urine     400   400      + I/O +   Straight Cath Urine     100   100       +I/O+    Urine Device Output (URINARY DRAIN Saenz)     300   300    Shift Total     400   400    NET      600   600   Weight (kg)      74.1 74.1 74.1 74.1         Labs:  No results for input(s): SM5QTAXHACY, FJX4RZVQQWSW, PHARTERIAL, XTU5ICKSKZRS, T6MUUARNWHLO in the last 24 hours.    No results for input(s): MAGNESIUM in the last 24 hours.    No results for input(s): GLUCOSEMETER in the last 720 hours.        Derrick Cedillo DO ....................  11/1/2017   12:56 PM

## 2017-12-28 NOTE — PROGRESS NOTES
"Patient Information     Patient Name MRN Radha Reich [id number] Female 1975      Progress Notes by Rashad Adhikari MD at 10/23/2017  3:15 PM     Author:  Rashad Adhikari MD Service:  (none) Author Type:  Physician     Filed:  10/23/2017  5:34 PM Encounter Date:  10/23/2017 Status:  Signed     :  Rashad Adhikari MD (Physician)            Nursing Notes:   Leydi Smart  10/23/2017  3:37 PM  Signed  Date of Surgery: 2017  Type of Surgery: A&P repair and cystoscopy  Surgeon: Dr Mc  Hospital:  [facility]      Fever/Chills or other infectious symptoms in past month: no  >10lb weight loss in past two months: no    Health Care Directive/Code status:  no  Hx of blood transfusions:   (YES)   Td up to date:  yes  History of VRE/MRSA:  (NO)     Preoperative Evaluation: Obstructive Sleep Apnea screening    S: Snore -  Do you snore loudly? (louder than talking or loud enough to be heard through closed doors)(NO)  T: Tired - Do you often feel tired, fatigued, or sleepy during the daytime?(YES)  O: Observed - Has anyone ever observed you stop breathing during your sleep?(NO)  P: Pressure - Do you have or are you being treated for high blood pressure?(NO)  B: BMI - BMI greater than 35kg/m2?(NO)  A: Age - Age over 50 years old?(NO)  N: Neck - Neck circumference greater than 40 cm?(NO)  G: Gender - Gender: Male?(NO)    Total number of \"YES\" responses:  1    Scoring: Low risk of ACE 0-2  At Risk of ACE: >3 High Risk of ACE: 5-  Leydi Smart LPN..................10/23/2017   3:37 PM            ----------------- PREOPERATIVE EXAM ------------------  10/23/2017    SUBJECTIVE:  Radha Ruiz is a 42 y.o. female here for preoperative optimization.    Preoperative risk assessment consultation was requested on Radha Ruiz by Shaun Mc MD prior to vaginal anterior posterior repair and cystoscopy.   This is scheduled for 2017. I am asked to see this patient for preoperative " clearance prior to this procedure.     She had a history of pulmonary embolus with one of her C-sections in the past, the plan is for prophylactic Lovenox prior to her procedure and then 6 weeks following. She will have her surgery under general anesthesia.        Patient Active Problem List       Diagnosis  Date Noted     Surgical menopause  2015     Melanosis coli  10/06/2014     H/O adenomatous polyp of colon  10/06/2014     FH: colon cancer  2014     TINEA VERSICOLOR  2012     CONSTIPATION       CIGARETTE SMOKER       ANXIETY       ALLERGIC RHINITIS, SEASONAL       MIGRAINE HEADACHE       RESTLESS LEG SYNDROME       Depression, major, recurrent, in partial remission (HC)  2007     PHQ-9 score 19-3 on 07.            Past Medical History:     Diagnosis  Date     History of blood transfusion 10/00    History of blood transfusion with       Hx of pregnancy      2, para 1-0-1-1      Pulmonary emboli (HC)     History of pulmonary emboli after       Routine gynecological examination 08    Satisfactory GYN examination         Past Surgical History:      Procedure  Laterality Date      SECTION  10/00    History of blood transfusion with        COLONOSCOPY DIAGNOSTIC  ,,    F/U 2019       CRYOTHERAPY OF CERVIX  1996    Cryosurgery of the cervix        [facility]      Ney Hart MD [facility]       TUBAL LIGATION  2006       Current Outpatient Prescriptions       Medication  Sig Dispense Refill     cetirizine-pseudoephedrine, 5-120 mg, (ZYRTEC-D) 5-120 mg tablet Take 1 tablet by mouth 2 times daily. 48 tablet 5     fluticasone (50 mcg per actuation) nasal solution (FLONASE) INHALE 2 SPRAYS INTO BOTH NOSTRILS ONCE DAILY. 3 Bottle 11     linaclotide (LINZESS) 145 mcg cap capsule Take 1 capsule by mouth before breakfast.  0     traZODone (DESYREL) 100 mg tablet TAKE 3-4 TABLETS BY MOUTH AT BEDTIME. 360 tablet 3     venlafaxine  "(EFFEXOR XR) 150 mg Extended-Release capsule Take 1 capsule by mouth once daily with a meal. 90 capsule 3     No current facility-administered medications for this visit.      Medications have been reviewed by me and are current to the best of my knowledge and ability.    Recent use of: no recent use of aspirin (ASA), NSAIDS or steroids    Allergies:  Allergies     Allergen  Reactions     Sulfa (Sulfonamide Antibiotics) Hives   Latex allergy  no    Family History       Problem   Relation Age of Onset     Cancer-colon  Father 38     Colon cancer       Psychiatric illness  Mother      Untreated anxiety       Other  Mother      D&C for postmenopausal bleeding benign       Cancer  Maternal Grandfather      Brain       Heart Disease  Maternal Grandfather      MI       Hypertension  Maternal Grandfather      Other  Maternal Uncle      Hemochromatosis       Hypertension  Maternal Grandmother      Other  Maternal Grandmother      Alzheimer's       Diabetes  Other      Diabetes        Heart Disease  Paternal Grandmother      CHF       Cancer  Maternal Uncle      cancer all over.         Cancer  Paternal Uncle      Lung cancer       Cancer-breast  No Family History        Denies family hx of bleeding tendencies, anesthesia complications, or other problems with surgery.    Social History        Substance Use Topics          Smoking status:   Current Every Day Smoker      Packs/day:  0.13      Types:  Cigarettes      Smokeless tobacco:   Never Used      Alcohol use   Yes      Comment: very rare         ROS:    Surgical:  patient denies previous complications from prior surgeries including but not limited to prolonged bleeding, anesthesia complications, dysrhythmias, surgical wound infections, or prolonged hospital stay.       -------------------------------------------------------------    PHYSICAL EXAM:  /90  Pulse 84  Ht 1.543 m (5' 0.75\")  Wt 72.8 kg (160 lb 9.6 oz)  LMP 10/11/2012  SpO2 99%  Breastfeeding? No  " BMI 30.6 kg/m2    EXAM:  General Appearance: Pleasant, alert, appropriate appearance for age. No acute distress  Head Exam: Normal. Normocephalic, atraumatic.  Eyes: PERRL, EOMI  Ears: Normal TM's bilaterally. Normal auditory canals and external ears.   OroPharynx: Dental hygiene adequate. Normal buccal mucosa. Normal pharynx.  Neck: Supple, no masses or nodes, no lymphadenopathy.  No thyromegaly.  Lungs: Normal chest wall and respirations. Clear to auscultation, no wheezes or crackles.  Cardiovascular: Regular rate and rhythm. S1, S2, no murmurs.  Gastrointestinal: Soft, nontender, no abnormal masses or organomegaly. BS normal   Musculoskeletal: No edema.  Skin: no concerning or new rashes.  Neurologic Exam: CN 2-12 grossly intact.  Normal gait. normal gross motor movement, tone, and coordination. No tremor.  Psychiatric Exam: Alert and oriented, appropriate affect.      EKG:  not indicated  ---------------------------------------------------------------  Results for orders placed or performed in visit on 10/23/17      COMP METABOLIC PANEL      Result  Value Ref Range    SODIUM 137 133 - 143 mmol/L    POTASSIUM 4.2 3.5 - 5.1 mmol/L    CHLORIDE 104 98 - 107 mmol/L    CO2,TOTAL 25 21 - 31 mmol/L    ANION GAP 8 5 - 18                    GLUCOSE 90 70 - 105 mg/dL    CALCIUM 9.5 8.6 - 10.3 mg/dL    BUN 9 7 - 25 mg/dL    CREATININE 0.76 0.70 - 1.30 mg/dL    BUN/CREAT RATIO           12                    GFR if African American >60 >60 ml/min/1.73m2    GFR if not African American >60 >60 ml/min/1.73m2    ALBUMIN 4.3 3.5 - 5.7 g/dL    PROTEIN,TOTAL 7.6 6.4 - 8.9 g/dL    GLOBULIN                  3.3 2.0 - 3.7 g/dL    A/G RATIO 1.3 1.0 - 2.0                    BILIRUBIN,TOTAL 0.3 0.3 - 1.0 mg/dL    ALK PHOSPHATASE 68 34 - 104 IU/L    ALT (SGPT) 20 7 - 52 IU/L    AST (SGOT) 24 13 - 39 IU/L   CBC WITH AUTO DIFFERENTIAL      Result  Value Ref Range    WHITE BLOOD COUNT         7.0 4.5 - 11.0 thou/cu mm    RED BLOOD COUNT            3.93 (L) 4.00 - 5.20 mil/cu mm    HEMOGLOBIN                12.8 12.0 - 16.0 g/dL    HEMATOCRIT                37.8 33.0 - 51.0 %    MCV                       96 80 - 100 fL    MCH                       32.6 26.0 - 34.0 pg    MCHC                      33.9 32.0 - 36.0 g/dL    RDW                       11.9 11.5 - 15.5 %    PLATELET COUNT            318 140 - 440 thou/cu mm    MPV                       9.0 6.5 - 11.0 fL    NEUTROPHILS               50.1 42.0 - 72.0 %    LYMPHOCYTES               41.6 20.0 - 44.0 %    MONOCYTES                 5.6 <12.0 %    EOSINOPHILS               1.7 <8.0 %    BASOPHILS                 0.7 <3.0 %    IMMATURE GRANULOCYTES(METAS,MYELOS,PROS) 0.3 %    ABSOLUTE NEUTROPHILS      3.5 1.7 - 7.0 thou/cu mm    ABSOLUTE LYMPHOCYTES      2.9 0.9 - 2.9 thou/cu mm    ABSOLUTE MONOCYTES        0.4 <0.9 thou/cu mm    ABSOLUTE EOSINOPHILS      0.1 <0.5 thou/cu mm    ABSOLUTE BASOPHILS        0.1 <0.3 thou/cu mm    ABSOLUTE IMMATURE GRANULOCYTES(METAS,MYELOS,PROS) 0.0 <=0.3 thou/cu mm   URINALYSIS W REFLEX MICROSCOPIC IF POSITIVE      Result  Value Ref Range    COLOR                     Yellow Yellow Color    CLARITY                   Clear Clear Clarity    SPECIFIC GRAVITY,URINE    1.010 1.010, 1.015, 1.020, 1.025                    PH,URINE                  8.0 6.0, 7.0, 8.0, 5.5, 6.5, 7.5, 8.5                    UROBILINOGEN,QUALITATIVE  Normal Normal EU/dl    PROTEIN, URINE Negative Negative mg/dL    GLUCOSE, URINE Negative Negative mg/dL    KETONES,URINE             Negative Negative mg/dL    BILIRUBIN,URINE           Negative Negative                    OCCULT BLOOD,URINE        Negative Negative                    NITRITE                   Negative Negative                    LEUKOCYTE ESTERASE        Negative Negative                       ASSESSEMENT AND PLAN:    No family history of problems with bleeding or anesthetia. Patient is able to tolerate greater than 4 METs of activity  without any cardiopulmonary symptoms. ASA PS class 2 and no cardiopulmonary workup is neccessary for the current procedure. Please contact the office with any questions or concerns.    Radha was seen today for preoperative exam.    Diagnoses and all orders for this visit:    Preop examination  -     CBC AND DIFFERENTIAL; Future  -     COMP METABOLIC PANEL; Future  -     URINALYSIS W REFLEX MICROSCOPIC IF POSITIVE; Future  -     CBC AND DIFFERENTIAL  -     COMP METABOLIC PANEL  -     CBC WITH AUTO DIFFERENTIAL  -     URINALYSIS W REFLEX MICROSCOPIC IF POSITIVE    Cystocele with rectocele    CIGARETTE SMOKER    ANXIETY    Need for influenza vaccination  -     FLU VACCINE => 3 YRS PF QUADRIVALENT IIV4 IM        PRE OP RECOMMENDATIONS:  Patient is on chronic pain medications (NO);   Patient is on antiplatlet/anticoagulation (NO)  Other medications that need adjustment perioperatively (NO)    Agree with Lovenox prophylaxis for 6 weeks starting the day of surgery.     Other:  Patient was advised to call our office and the surgical services with any change in condition or new symptoms if they were to develop between today and their surgical date.  Especially any cardiopulmonary symptoms or symptoms concerning for an infection.    Rashad Adhikari MD

## 2017-12-28 NOTE — H&P
"Patient Information     Patient Name MRN Sex Radha Ricci [id number] Female 1975      H&P (View-Only) by Rashad Adhikari MD at 10/23/2017  3:15 PM     Author:  Rashad Adhikari MD Service:  (none) Author Type:  Physician     Filed:  10/23/2017  5:34 PM Date of Service:  10/23/2017  3:15 PM Status:  Signed     :  Rashad Adhikari MD (Physician)            Nursing Notes:   Leydi Smart  10/23/2017  3:37 PM  Signed  Date of Surgery: 2017  Type of Surgery: A&P repair and cystoscopy  Surgeon: Dr Mc  [facility]:  [facility]      Fever/Chills or other infectious symptoms in past month: no  >10lb weight loss in past two months: no    Health Care Directive/Code status:  no  Hx of blood transfusions:   (YES)   Td up to date:  yes  History of VRE/MRSA:  (NO)     Preoperative Evaluation: Obstructive Sleep Apnea screening    S: Snore -  Do you snore loudly? (louder than talking or loud enough to be heard through closed doors)(NO)  T: Tired - Do you often feel tired, fatigued, or sleepy during the daytime?(YES)  O: Observed - Has anyone ever observed you stop breathing during your sleep?(NO)  P: Pressure - Do you have or are you being treated for high blood pressure?(NO)  B: BMI - BMI greater than 35kg/m2?(NO)  A: Age - Age over 50 years old?(NO)  N: Neck - Neck circumference greater than 40 cm?(NO)  G: Gender - Gender: Male?(NO)    Total number of \"YES\" responses:  1    Scoring: Low risk of ACE 0-2  At Risk of ACE: >3 High Risk of ACE: 5-  Leydi Smart LPN..................10/23/2017   3:37 PM            ----------------- PREOPERATIVE EXAM ------------------  10/23/2017    SUBJECTIVE:  Radha Ruiz is a 42 y.o. female here for preoperative optimization.    Preoperative risk assessment consultation was requested on Radha Ruiz by Shaun Mc MD prior to vaginal anterior posterior repair and cystoscopy.   This is scheduled for 2017. I am asked to see this patient for " preoperative clearance prior to this procedure.     She had a history of pulmonary embolus with one of her C-sections in the past, the plan is for prophylactic Lovenox prior to her procedure and then 6 weeks following. She will have her surgery under general anesthesia.        Patient Active Problem List       Diagnosis  Date Noted     Surgical menopause  2015     Melanosis coli  10/06/2014     H/O adenomatous polyp of colon  10/06/2014     FH: colon cancer  2014     TINEA VERSICOLOR  2012     CONSTIPATION       CIGARETTE SMOKER       ANXIETY       ALLERGIC RHINITIS, SEASONAL       MIGRAINE HEADACHE       RESTLESS LEG SYNDROME       Depression, major, recurrent, in partial remission (HC)  2007     PHQ-9 score 19-3 on 07.            Past Medical History:     Diagnosis  Date     History of blood transfusion 10/00    History of blood transfusion with       Hx of pregnancy      2, para 1-0-1-1      Pulmonary emboli (HC)     History of pulmonary emboli after       Routine gynecological examination 08    Satisfactory GYN examination         Past Surgical History:      Procedure  Laterality Date      SECTION  10/00    History of blood transfusion with        COLONOSCOPY DIAGNOSTIC  ,,    F/U 2019       CRYOTHERAPY OF CERVIX  1996    Cryosurgery of the cervix        [facility]      Ney Hart MD [facility]       TUBAL LIGATION  2006       Current Outpatient Prescriptions       Medication  Sig Dispense Refill     cetirizine-pseudoephedrine, 5-120 mg, (ZYRTEC-D) 5-120 mg tablet Take 1 tablet by mouth 2 times daily. 48 tablet 5     fluticasone (50 mcg per actuation) nasal solution (FLONASE) INHALE 2 SPRAYS INTO BOTH NOSTRILS ONCE DAILY. 3 Bottle 11     linaclotide (LINZESS) 145 mcg cap capsule Take 1 capsule by mouth before breakfast.  0     traZODone (DESYREL) 100 mg tablet TAKE 3-4 TABLETS BY MOUTH AT BEDTIME. 360 tablet 3      "venlafaxine (EFFEXOR XR) 150 mg Extended-Release capsule Take 1 capsule by mouth once daily with a meal. 90 capsule 3     No current facility-administered medications for this visit.      Medications have been reviewed by me and are current to the best of my knowledge and ability.    Recent use of: no recent use of aspirin (ASA), NSAIDS or steroids    Allergies:  Allergies     Allergen  Reactions     Sulfa (Sulfonamide Antibiotics) Hives   Latex allergy  no    Family History       Problem   Relation Age of Onset     Cancer-colon  Father 38     Colon cancer       Psychiatric illness  Mother      Untreated anxiety       Other  Mother      D&C for postmenopausal bleeding benign       Cancer  Maternal Grandfather      Brain       Heart Disease  Maternal Grandfather      MI       Hypertension  Maternal Grandfather      Other  Maternal Uncle      Hemochromatosis       Hypertension  Maternal Grandmother      Other  Maternal Grandmother      Alzheimer's       Diabetes  Other      Diabetes        Heart Disease  Paternal Grandmother      CHF       Cancer  Maternal Uncle      cancer all over.         Cancer  Paternal Uncle      Lung cancer       Cancer-breast  No Family History        Denies family hx of bleeding tendencies, anesthesia complications, or other problems with surgery.    Social History        Substance Use Topics          Smoking status:   Current Every Day Smoker      Packs/day:  0.13      Types:  Cigarettes      Smokeless tobacco:   Never Used      Alcohol use   Yes      Comment: very rare         ROS:    Surgical:  patient denies previous complications from prior surgeries including but not limited to prolonged bleeding, anesthesia complications, dysrhythmias, surgical wound infections, or prolonged hospital stay.       -------------------------------------------------------------    PHYSICAL EXAM:  /90  Pulse 84  Ht 1.543 m (5' 0.75\")  Wt 72.8 kg (160 lb 9.6 oz)  LMP 10/11/2012  SpO2 99%  " Breastfeeding? No  BMI 30.6 kg/m2    EXAM:  General Appearance: Pleasant, alert, appropriate appearance for age. No acute distress  Head Exam: Normal. Normocephalic, atraumatic.  Eyes: PERRL, EOMI  Ears: Normal TM's bilaterally. Normal auditory canals and external ears.   OroPharynx: Dental hygiene adequate. Normal buccal mucosa. Normal pharynx.  Neck: Supple, no masses or nodes, no lymphadenopathy.  No thyromegaly.  Lungs: Normal chest wall and respirations. Clear to auscultation, no wheezes or crackles.  Cardiovascular: Regular rate and rhythm. S1, S2, no murmurs.  Gastrointestinal: Soft, nontender, no abnormal masses or organomegaly. BS normal   Musculoskeletal: No edema.  Skin: no concerning or new rashes.  Neurologic Exam: CN 2-12 grossly intact.  Normal gait. normal gross motor movement, tone, and coordination. No tremor.  Psychiatric Exam: Alert and oriented, appropriate affect.      EKG:  not indicated  ---------------------------------------------------------------  Results for orders placed or performed in visit on 10/23/17      COMP METABOLIC PANEL      Result  Value Ref Range    SODIUM 137 133 - 143 mmol/L    POTASSIUM 4.2 3.5 - 5.1 mmol/L    CHLORIDE 104 98 - 107 mmol/L    CO2,TOTAL 25 21 - 31 mmol/L    ANION GAP 8 5 - 18                    GLUCOSE 90 70 - 105 mg/dL    CALCIUM 9.5 8.6 - 10.3 mg/dL    BUN 9 7 - 25 mg/dL    CREATININE 0.76 0.70 - 1.30 mg/dL    BUN/CREAT RATIO           12                    GFR if African American >60 >60 ml/min/1.73m2    GFR if not African American >60 >60 ml/min/1.73m2    ALBUMIN 4.3 3.5 - 5.7 g/dL    PROTEIN,TOTAL 7.6 6.4 - 8.9 g/dL    GLOBULIN                  3.3 2.0 - 3.7 g/dL    A/G RATIO 1.3 1.0 - 2.0                    BILIRUBIN,TOTAL 0.3 0.3 - 1.0 mg/dL    ALK PHOSPHATASE 68 34 - 104 IU/L    ALT (SGPT) 20 7 - 52 IU/L    AST (SGOT) 24 13 - 39 IU/L   CBC WITH AUTO DIFFERENTIAL      Result  Value Ref Range    WHITE BLOOD COUNT         7.0 4.5 - 11.0 thou/cu mm     RED BLOOD COUNT           3.93 (L) 4.00 - 5.20 mil/cu mm    HEMOGLOBIN                12.8 12.0 - 16.0 g/dL    HEMATOCRIT                37.8 33.0 - 51.0 %    MCV                       96 80 - 100 fL    MCH                       32.6 26.0 - 34.0 pg    MCHC                      33.9 32.0 - 36.0 g/dL    RDW                       11.9 11.5 - 15.5 %    PLATELET COUNT            318 140 - 440 thou/cu mm    MPV                       9.0 6.5 - 11.0 fL    NEUTROPHILS               50.1 42.0 - 72.0 %    LYMPHOCYTES               41.6 20.0 - 44.0 %    MONOCYTES                 5.6 <12.0 %    EOSINOPHILS               1.7 <8.0 %    BASOPHILS                 0.7 <3.0 %    IMMATURE GRANULOCYTES(METAS,MYELOS,PROS) 0.3 %    ABSOLUTE NEUTROPHILS      3.5 1.7 - 7.0 thou/cu mm    ABSOLUTE LYMPHOCYTES      2.9 0.9 - 2.9 thou/cu mm    ABSOLUTE MONOCYTES        0.4 <0.9 thou/cu mm    ABSOLUTE EOSINOPHILS      0.1 <0.5 thou/cu mm    ABSOLUTE BASOPHILS        0.1 <0.3 thou/cu mm    ABSOLUTE IMMATURE GRANULOCYTES(METAS,MYELOS,PROS) 0.0 <=0.3 thou/cu mm   URINALYSIS W REFLEX MICROSCOPIC IF POSITIVE      Result  Value Ref Range    COLOR                     Yellow Yellow Color    CLARITY                   Clear Clear Clarity    SPECIFIC GRAVITY,URINE    1.010 1.010, 1.015, 1.020, 1.025                    PH,URINE                  8.0 6.0, 7.0, 8.0, 5.5, 6.5, 7.5, 8.5                    UROBILINOGEN,QUALITATIVE  Normal Normal EU/dl    PROTEIN, URINE Negative Negative mg/dL    GLUCOSE, URINE Negative Negative mg/dL    KETONES,URINE             Negative Negative mg/dL    BILIRUBIN,URINE           Negative Negative                    OCCULT BLOOD,URINE        Negative Negative                    NITRITE                   Negative Negative                    LEUKOCYTE ESTERASE        Negative Negative                       ASSESSEMENT AND PLAN:    No family history of problems with bleeding or anesthetia. Patient is able to tolerate greater than 4  METs of activity without any cardiopulmonary symptoms. ASA PS class 2 and no cardiopulmonary workup is neccessary for the current procedure. Please contact the office with any questions or concerns.    Radha was seen today for preoperative exam.    Diagnoses and all orders for this visit:    Preop examination  -     CBC AND DIFFERENTIAL; Future  -     COMP METABOLIC PANEL; Future  -     URINALYSIS W REFLEX MICROSCOPIC IF POSITIVE; Future  -     CBC AND DIFFERENTIAL  -     COMP METABOLIC PANEL  -     CBC WITH AUTO DIFFERENTIAL  -     URINALYSIS W REFLEX MICROSCOPIC IF POSITIVE    Cystocele with rectocele    CIGARETTE SMOKER    ANXIETY    Need for influenza vaccination  -     FLU VACCINE => 3 YRS PF QUADRIVALENT IIV4 IM        PRE OP RECOMMENDATIONS:  Patient is on chronic pain medications (NO);   Patient is on antiplatlet/anticoagulation (NO)  Other medications that need adjustment perioperatively (NO)    Agree with Lovenox prophylaxis for 6 weeks starting the day of surgery.     Other:  Patient was advised to call our office and the surgical services with any change in condition or new symptoms if they were to develop between today and their surgical date.  Especially any cardiopulmonary symptoms or symptoms concerning for an infection.    Rashad Adhikari MD

## 2017-12-28 NOTE — PROGRESS NOTES
Patient Information     Patient Name MRN Sex Radha Ricci [id number] Female 1975      Progress Notes by Luz Hartmann PharmD at 2017 10:49 AM     Author:  Luz Hartmann PharmD Service:  (none) Author Type:  PHARM- Pharmacist     Filed:  2017 10:53 AM Date of Service:  2017 10:49 AM Status:  Signed     :  Luz Hartmann PharmD (PHARM- Pharmacist)            Pharmacy: Discharge Counseling and Medication Reconciliation    Radha Ruiz  [address] [address]    Home Phone [phone]   Work Phone Not on file.   Mobile Not on file.     42 y.o. female  PCP:Rashad Adhikari MD    Allergies     Allergen  Reactions     Sulfa (Sulfonamide Antibiotics) Hives     Discharge Counseling:    Pharmacist met with patient (and/or family) today to review the medication portion of the After Visit Summary (with an emphasis on NEW medications) and to address patient's questions/concerns.     Summary of Education: Met with patient to discuss new medications: Percocet, ibuprofen, enoxaparin, bowel regimen.  Patient states she has taken all of these medications in the past.  She is very familiar with enoxaparin and has used it after each surgery.  Patient was provided with an enoxaparin teaching kit.    Materials Provided:   MedCounselor sheets printed from Clinical Pharmacology on: enoxaparin, oxycodone/acetaminophen    Discharge Medication Reconciliation:    Luz Hartmann PharmD has reviewed the patient's discharge medication orders and has compared them to the inpatient medication administration record and to what the patient was taking prior to admission- any discrepancies have been resolved.     Thank you for the consult.     Luz Hartmann PharmD ....................  2017   10:49 AM

## 2017-12-28 NOTE — INTERVAL H&P NOTE
"Patient Information     Patient Name MRN Radha Reich [id number] Female 1975      Interval H&P Note by Shaun Mc MD at 2017  7:12 AM     Author:  Shaun Mc MD Service:  (none) Author Type:  Physician     Filed:  2017  7:12 AM Date of Service:  2017  7:12 AM Status:  Signed     :  Shaun Mc MD (Physician)            History and Physical Update    The history and physical has been reviewed and the patient has been examined.  There are no interim changes to the patient's history or physical condition.    Shaun Mc MD          Source Note     Author:  Rashad Adhikari MD Service:  (none) Author Type:  Physician    Filed:  10/23/2017  5:34 PM Date of Service:  10/23/2017  3:15 PM Status:  Signed    :  Rashad Adhikari MD (Physician)              Nursing Notes:   Leydi Smart  10/23/2017  3:37 PM  Signed  Date of Surgery: 2017  Type of Surgery: A&P repair and cystoscopy  Surgeon: Dr Mc  [facility]:  [facility]      Fever/Chills or other infectious symptoms in past month: no  >10lb weight loss in past two months: no    Health Care Directive/Code status:  no  Hx of blood transfusions:   (YES)   Td up to date:  yes  History of VRE/MRSA:  (NO)     Preoperative Evaluation: Obstructive Sleep Apnea screening    S: Snore -  Do you snore loudly? (louder than talking or loud enough to be heard through closed doors)(NO)  T: Tired - Do you often feel tired, fatigued, or sleepy during the daytime?(YES)  O: Observed - Has anyone ever observed you stop breathing during your sleep?(NO)  P: Pressure - Do you have or are you being treated for high blood pressure?(NO)  B: BMI - BMI greater than 35kg/m2?(NO)  A: Age - Age over 50 years old?(NO)  N: Neck - Neck circumference greater than 40 cm?(NO)  G: Gender - Gender: Male?(NO)    Total number of \"YES\" responses:  1    Scoring: Low risk of ACE 0-2  At Risk of ACE: >3 High Risk of ACE: 5-  Leydi Smart " LPN..................10/23/2017   3:37 PM            ----------------- PREOPERATIVE EXAM ------------------  10/23/2017    SUBJECTIVE:  Radha Ruiz is a 42 y.o. female here for preoperative optimization.    Preoperative risk assessment consultation was requested on Radha Ruiz by Shaun Mc MD prior to vaginal anterior posterior repair and cystoscopy.   This is scheduled for 2017. I am asked to see this patient for preoperative clearance prior to this procedure.     She had a history of pulmonary embolus with one of her C-sections in the past, the plan is for prophylactic Lovenox prior to her procedure and then 6 weeks following. She will have her surgery under general anesthesia.        Patient Active Problem List       Diagnosis  Date Noted     Surgical menopause  2015     Melanosis coli  10/06/2014     H/O adenomatous polyp of colon  10/06/2014     FH: colon cancer  2014     TINEA VERSICOLOR  2012     CONSTIPATION       CIGARETTE SMOKER       ANXIETY       ALLERGIC RHINITIS, SEASONAL       MIGRAINE HEADACHE       RESTLESS LEG SYNDROME       Depression, major, recurrent, in partial remission (HC)  2007     PHQ-9 score 19-3 on 07.            Past Medical History:     Diagnosis  Date     History of blood transfusion 10/00    History of blood transfusion with       Hx of pregnancy      2, para 1-0-1-1      Pulmonary emboli (HC)     History of pulmonary emboli after       Routine gynecological examination 08    Satisfactory GYN examination         Past Surgical History:      Procedure  Laterality Date      SECTION  10/00    History of blood transfusion with        COLONOSCOPY DIAGNOSTIC  ,,    F/U        CRYOTHERAPY OF CERVIX  1996    Cryosurgery of the cervix        [facility]      Ney Hart MD [facility]       TUBAL LIGATION  2006       Current Outpatient Prescriptions       Medication  Sig  Dispense Refill     cetirizine-pseudoephedrine, 5-120 mg, (ZYRTEC-D) 5-120 mg tablet Take 1 tablet by mouth 2 times daily. 48 tablet 5     fluticasone (50 mcg per actuation) nasal solution (FLONASE) INHALE 2 SPRAYS INTO BOTH NOSTRILS ONCE DAILY. 3 Bottle 11     linaclotide (LINZESS) 145 mcg cap capsule Take 1 capsule by mouth before breakfast.  0     traZODone (DESYREL) 100 mg tablet TAKE 3-4 TABLETS BY MOUTH AT BEDTIME. 360 tablet 3     venlafaxine (EFFEXOR XR) 150 mg Extended-Release capsule Take 1 capsule by mouth once daily with a meal. 90 capsule 3     No current facility-administered medications for this visit.      Medications have been reviewed by me and are current to the best of my knowledge and ability.    Recent use of: no recent use of aspirin (ASA), NSAIDS or steroids    Allergies:  Allergies     Allergen  Reactions     Sulfa (Sulfonamide Antibiotics) Hives   Latex allergy  no    Family History       Problem   Relation Age of Onset     Cancer-colon  Father 38     Colon cancer       Psychiatric illness  Mother      Untreated anxiety       Other  Mother      D&C for postmenopausal bleeding benign       Cancer  Maternal Grandfather      Brain       Heart Disease  Maternal Grandfather      MI       Hypertension  Maternal Grandfather      Other  Maternal Uncle      Hemochromatosis       Hypertension  Maternal Grandmother      Other  Maternal Grandmother      Alzheimer's       Diabetes  Other      Diabetes        Heart Disease  Paternal Grandmother      CHF       Cancer  Maternal Uncle      cancer all over.         Cancer  Paternal Uncle      Lung cancer       Cancer-breast  No Family History        Denies family hx of bleeding tendencies, anesthesia complications, or other problems with surgery.    Social History        Substance Use Topics          Smoking status:   Current Every Day Smoker      Packs/day:  0.13      Types:  Cigarettes      Smokeless tobacco:   Never Used      Alcohol use   Yes       "Comment: very rare         ROS:    Surgical:  patient denies previous complications from prior surgeries including but not limited to prolonged bleeding, anesthesia complications, dysrhythmias, surgical wound infections, or prolonged hospital stay.       -------------------------------------------------------------    PHYSICAL EXAM:  /90  Pulse 84  Ht 1.543 m (5' 0.75\")  Wt 72.8 kg (160 lb 9.6 oz)  LMP 10/11/2012  SpO2 99%  Breastfeeding? No  BMI 30.6 kg/m2    EXAM:  General Appearance: Pleasant, alert, appropriate appearance for age. No acute distress  Head Exam: Normal. Normocephalic, atraumatic.  Eyes: PERRL, EOMI  Ears: Normal TM's bilaterally. Normal auditory canals and external ears.   OroPharynx: Dental hygiene adequate. Normal buccal mucosa. Normal pharynx.  Neck: Supple, no masses or nodes, no lymphadenopathy.  No thyromegaly.  Lungs: Normal chest wall and respirations. Clear to auscultation, no wheezes or crackles.  Cardiovascular: Regular rate and rhythm. S1, S2, no murmurs.  Gastrointestinal: Soft, nontender, no abnormal masses or organomegaly. BS normal   Musculoskeletal: No edema.  Skin: no concerning or new rashes.  Neurologic Exam: CN 2-12 grossly intact.  Normal gait. normal gross motor movement, tone, and coordination. No tremor.  Psychiatric Exam: Alert and oriented, appropriate affect.      EKG:  not indicated  ---------------------------------------------------------------  Results for orders placed or performed in visit on 10/23/17      COMP METABOLIC PANEL      Result  Value Ref Range    SODIUM 137 133 - 143 mmol/L    POTASSIUM 4.2 3.5 - 5.1 mmol/L    CHLORIDE 104 98 - 107 mmol/L    CO2,TOTAL 25 21 - 31 mmol/L    ANION GAP 8 5 - 18                    GLUCOSE 90 70 - 105 mg/dL    CALCIUM 9.5 8.6 - 10.3 mg/dL    BUN 9 7 - 25 mg/dL    CREATININE 0.76 0.70 - 1.30 mg/dL    BUN/CREAT RATIO           12                    GFR if African American >60 >60 ml/min/1.73m2    GFR if not " African American >60 >60 ml/min/1.73m2    ALBUMIN 4.3 3.5 - 5.7 g/dL    PROTEIN,TOTAL 7.6 6.4 - 8.9 g/dL    GLOBULIN                  3.3 2.0 - 3.7 g/dL    A/G RATIO 1.3 1.0 - 2.0                    BILIRUBIN,TOTAL 0.3 0.3 - 1.0 mg/dL    ALK PHOSPHATASE 68 34 - 104 IU/L    ALT (SGPT) 20 7 - 52 IU/L    AST (SGOT) 24 13 - 39 IU/L   CBC WITH AUTO DIFFERENTIAL      Result  Value Ref Range    WHITE BLOOD COUNT         7.0 4.5 - 11.0 thou/cu mm    RED BLOOD COUNT           3.93 (L) 4.00 - 5.20 mil/cu mm    HEMOGLOBIN                12.8 12.0 - 16.0 g/dL    HEMATOCRIT                37.8 33.0 - 51.0 %    MCV                       96 80 - 100 fL    MCH                       32.6 26.0 - 34.0 pg    MCHC                      33.9 32.0 - 36.0 g/dL    RDW                       11.9 11.5 - 15.5 %    PLATELET COUNT            318 140 - 440 thou/cu mm    MPV                       9.0 6.5 - 11.0 fL    NEUTROPHILS               50.1 42.0 - 72.0 %    LYMPHOCYTES               41.6 20.0 - 44.0 %    MONOCYTES                 5.6 <12.0 %    EOSINOPHILS               1.7 <8.0 %    BASOPHILS                 0.7 <3.0 %    IMMATURE GRANULOCYTES(METAS,MYELOS,PROS) 0.3 %    ABSOLUTE NEUTROPHILS      3.5 1.7 - 7.0 thou/cu mm    ABSOLUTE LYMPHOCYTES      2.9 0.9 - 2.9 thou/cu mm    ABSOLUTE MONOCYTES        0.4 <0.9 thou/cu mm    ABSOLUTE EOSINOPHILS      0.1 <0.5 thou/cu mm    ABSOLUTE BASOPHILS        0.1 <0.3 thou/cu mm    ABSOLUTE IMMATURE GRANULOCYTES(METAS,MYELOS,PROS) 0.0 <=0.3 thou/cu mm   URINALYSIS W REFLEX MICROSCOPIC IF POSITIVE      Result  Value Ref Range    COLOR                     Yellow Yellow Color    CLARITY                   Clear Clear Clarity    SPECIFIC GRAVITY,URINE    1.010 1.010, 1.015, 1.020, 1.025                    PH,URINE                  8.0 6.0, 7.0, 8.0, 5.5, 6.5, 7.5, 8.5                    UROBILINOGEN,QUALITATIVE  Normal Normal EU/dl    PROTEIN, URINE Negative Negative mg/dL    GLUCOSE, URINE Negative  Negative mg/dL    KETONES,URINE             Negative Negative mg/dL    BILIRUBIN,URINE           Negative Negative                    OCCULT BLOOD,URINE        Negative Negative                    NITRITE                   Negative Negative                    LEUKOCYTE ESTERASE        Negative Negative                       ASSESSEMENT AND PLAN:    No family history of problems with bleeding or anesthetia. Patient is able to tolerate greater than 4 METs of activity without any cardiopulmonary symptoms. ASA PS class 2 and no cardiopulmonary workup is neccessary for the current procedure. Please contact the office with any questions or concerns.    Radha ureañ today for preoperative exam.    Diagnoses and all orders for this visit:    Preop examination  -     CBC AND DIFFERENTIAL; Future  -     COMP METABOLIC PANEL; Future  -     URINALYSIS W REFLEX MICROSCOPIC IF POSITIVE; Future  -     CBC AND DIFFERENTIAL  -     COMP METABOLIC PANEL  -     CBC WITH AUTO DIFFERENTIAL  -     URINALYSIS W REFLEX MICROSCOPIC IF POSITIVE    Cystocele with rectocele    CIGARETTE SMOKER    ANXIETY    Need for influenza vaccination  -     FLU VACCINE => 3 YRS PF QUADRIVALENT IIV4 IM        PRE OP RECOMMENDATIONS:  Patient is on chronic pain medications (NO);   Patient is on antiplatlet/anticoagulation (NO)  Other medications that need adjustment perioperatively (NO)    Agree with Lovenox prophylaxis for 6 weeks starting the day of surgery.     Other:  Patient was advised to call our office and the surgical services with any change in condition or new symptoms if they were to develop between today and their surgical date.  Especially any cardiopulmonary symptoms or symptoms concerning for an infection.    Rahsad Adhikari MD

## 2017-12-28 NOTE — TELEPHONE ENCOUNTER
Patient Information     Patient Name MRN Sex Radha Ricci [id number] Female 1975      Telephone Encounter by Georgia Almanzar RN at 2017 12:50 PM     Author:  Georgia Almanzar RN Service:  (none) Author Type:  NURS- Registered Nurse     Filed:  2017 12:51 PM Encounter Date:  2017 Status:  Signed     :  Georgia Almanzar RN (NURS- Registered Nurse)            This is a Refill request from: [facility]  Name of Medication: cetirizine-pseudoephedrine, 5-120 mg, (ZYRTEC-D) 5-120 mg tablet  TAKE 1 TABLET BY MOUTH TWICE DAILY  Quantity requested: 48  Last fill date: 10/23/17  Due for refill: yes  Last visit with ROSE ADHIKARI was on: 10/23/2017 in [facility]  PCP:  Rose Adhikari MD  Controlled Substance Agreement:  na   Diagnosis r/t this medication request: allergic rhinitis due to pollen     Unable to complete prescription refill per RN Medication Refill Policy.................... GEORGIA ALMANZAR RN ....................  2017   12:50 PM

## 2017-12-28 NOTE — TELEPHONE ENCOUNTER
Patient Information     Patient Name MRN Sex Radha Ricci [id number] Female 1975      Telephone Encounter by Rashad Adhikari MD at 2017 12:27 PM     Author:  Rashad Adhikari MD Service:  (none) Author Type:  Physician     Filed:  2017 12:27 PM Encounter Date:  2017 Status:  Signed     :  Rashad Adhikari MD (Physician)            Do we have a provider fill to put her in?  Rashad Adhikari MD ....................  2017   12:27 PM

## 2017-12-28 NOTE — TELEPHONE ENCOUNTER
Patient Information     Patient Name MRN Radha Reich [id number] Female 1975      Telephone Encounter by Leydi Smart at 2017  1:26 PM     Author:  Leydi Smart Service:  (none) Author Type:  (none)     Filed:  2017  1:26 PM Encounter Date:  2017 Status:  Signed     :  Leydi Smart            Signed prescription faxed.  Leydi Smart LPN.............2017 1:26 PM

## 2017-12-28 NOTE — TELEPHONE ENCOUNTER
Patient Information     Patient Name MRN Radha Reich [id number] Female 1975      Telephone Encounter by Leydi Smart at 2017  3:59 PM     Author:  Leydi Smart Service:  (none) Author Type:  (none)     Filed:  2017  3:59 PM Encounter Date:  2017 Status:  Signed     :  Leydi Smart            Appointment given tomorrow with Rashad Adhikari MD.  Leydi Smart LPN..................2017   3:59 PM

## 2017-12-29 NOTE — H&P
"Patient Information     Patient Name MRN Radha Reich [id number] Female 1975      H&P by Rashad Adhikari MD at 10/23/2017  3:15 PM     Author:  Rashad Adhikari MD Service:  (none) Author Type:  Physician     Filed:  10/23/2017  5:34 PM Encounter Date:  10/23/2017 Status:  Signed     :  Rashad Adhikari MD (Physician)            Nursing Notes:   Leydi Smart  10/23/2017  3:37 PM  Signed  Date of Surgery: 2017  Type of Surgery: A&P repair and cystoscopy  Surgeon: Dr Mc  Hospital:  [facility]      Fever/Chills or other infectious symptoms in past month: no  >10lb weight loss in past two months: no    Health Care Directive/Code status:  no  Hx of blood transfusions:   (YES)   Td up to date:  yes  History of VRE/MRSA:  (NO)     Preoperative Evaluation: Obstructive Sleep Apnea screening    S: Snore -  Do you snore loudly? (louder than talking or loud enough to be heard through closed doors)(NO)  T: Tired - Do you often feel tired, fatigued, or sleepy during the daytime?(YES)  O: Observed - Has anyone ever observed you stop breathing during your sleep?(NO)  P: Pressure - Do you have or are you being treated for high blood pressure?(NO)  B: BMI - BMI greater than 35kg/m2?(NO)  A: Age - Age over 50 years old?(NO)  N: Neck - Neck circumference greater than 40 cm?(NO)  G: Gender - Gender: Male?(NO)    Total number of \"YES\" responses:  1    Scoring: Low risk of ACE 0-2  At Risk of ACE: >3 High Risk of ACE: 5-  Leydi Smart LPN..................10/23/2017   3:37 PM            ----------------- PREOPERATIVE EXAM ------------------  10/23/2017    SUBJECTIVE:  Radha Ruiz is a 42 y.o. female here for preoperative optimization.    Preoperative risk assessment consultation was requested on Radha Ruiz by Shaun Mc MD prior to vaginal anterior posterior repair and cystoscopy.   This is scheduled for 2017. I am asked to see this patient for preoperative clearance prior to " this procedure.     She had a history of pulmonary embolus with one of her C-sections in the past, the plan is for prophylactic Lovenox prior to her procedure and then 6 weeks following. She will have her surgery under general anesthesia.        Patient Active Problem List       Diagnosis  Date Noted     Surgical menopause  2015     Melanosis coli  10/06/2014     H/O adenomatous polyp of colon  10/06/2014     FH: colon cancer  2014     TINEA VERSICOLOR  2012     CONSTIPATION       CIGARETTE SMOKER       ANXIETY       ALLERGIC RHINITIS, SEASONAL       MIGRAINE HEADACHE       RESTLESS LEG SYNDROME       Depression, major, recurrent, in partial remission (HC)  2007     PHQ-9 score 19-3 on 07.            Past Medical History:     Diagnosis  Date     History of blood transfusion 10/00    History of blood transfusion with       Hx of pregnancy      2, para 1-0-1-1      Pulmonary emboli (HC)     History of pulmonary emboli after       Routine gynecological examination 08    Satisfactory GYN examination         Past Surgical History:      Procedure  Laterality Date      SECTION  10/00    History of blood transfusion with        COLONOSCOPY DIAGNOSTIC  ,,    F/U 2019       CRYOTHERAPY OF CERVIX  1996    Cryosurgery of the cervix        [facility]      Ney Hart MD [facility]       TUBAL LIGATION  2006       Current Outpatient Prescriptions       Medication  Sig Dispense Refill     cetirizine-pseudoephedrine, 5-120 mg, (ZYRTEC-D) 5-120 mg tablet Take 1 tablet by mouth 2 times daily. 48 tablet 5     fluticasone (50 mcg per actuation) nasal solution (FLONASE) INHALE 2 SPRAYS INTO BOTH NOSTRILS ONCE DAILY. 3 Bottle 11     linaclotide (LINZESS) 145 mcg cap capsule Take 1 capsule by mouth before breakfast.  0     traZODone (DESYREL) 100 mg tablet TAKE 3-4 TABLETS BY MOUTH AT BEDTIME. 360 tablet 3     venlafaxine (EFFEXOR XR) 150 mg  "Extended-Release capsule Take 1 capsule by mouth once daily with a meal. 90 capsule 3     No current facility-administered medications for this visit.      Medications have been reviewed by me and are current to the best of my knowledge and ability.    Recent use of: no recent use of aspirin (ASA), NSAIDS or steroids    Allergies:  Allergies     Allergen  Reactions     Sulfa (Sulfonamide Antibiotics) Hives   Latex allergy  no    Family History       Problem   Relation Age of Onset     Cancer-colon  Father 38     Colon cancer       Psychiatric illness  Mother      Untreated anxiety       Other  Mother      D&C for postmenopausal bleeding benign       Cancer  Maternal Grandfather      Brain       Heart Disease  Maternal Grandfather      MI       Hypertension  Maternal Grandfather      Other  Maternal Uncle      Hemochromatosis       Hypertension  Maternal Grandmother      Other  Maternal Grandmother      Alzheimer's       Diabetes  Other      Diabetes        Heart Disease  Paternal Grandmother      CHF       Cancer  Maternal Uncle      cancer all over.         Cancer  Paternal Uncle      Lung cancer       Cancer-breast  No Family History        Denies family hx of bleeding tendencies, anesthesia complications, or other problems with surgery.    Social History        Substance Use Topics          Smoking status:   Current Every Day Smoker      Packs/day:  0.13      Types:  Cigarettes      Smokeless tobacco:   Never Used      Alcohol use   Yes      Comment: very rare         ROS:    Surgical:  patient denies previous complications from prior surgeries including but not limited to prolonged bleeding, anesthesia complications, dysrhythmias, surgical wound infections, or prolonged hospital stay.       -------------------------------------------------------------    PHYSICAL EXAM:  /90  Pulse 84  Ht 1.543 m (5' 0.75\")  Wt 72.8 kg (160 lb 9.6 oz)  LMP 10/11/2012  SpO2 99%  Breastfeeding? No  BMI 30.6 " kg/m2    EXAM:  General Appearance: Pleasant, alert, appropriate appearance for age. No acute distress  Head Exam: Normal. Normocephalic, atraumatic.  Eyes: PERRL, EOMI  Ears: Normal TM's bilaterally. Normal auditory canals and external ears.   OroPharynx: Dental hygiene adequate. Normal buccal mucosa. Normal pharynx.  Neck: Supple, no masses or nodes, no lymphadenopathy.  No thyromegaly.  Lungs: Normal chest wall and respirations. Clear to auscultation, no wheezes or crackles.  Cardiovascular: Regular rate and rhythm. S1, S2, no murmurs.  Gastrointestinal: Soft, nontender, no abnormal masses or organomegaly. BS normal   Musculoskeletal: No edema.  Skin: no concerning or new rashes.  Neurologic Exam: CN 2-12 grossly intact.  Normal gait. normal gross motor movement, tone, and coordination. No tremor.  Psychiatric Exam: Alert and oriented, appropriate affect.      EKG:  not indicated  ---------------------------------------------------------------  Results for orders placed or performed in visit on 10/23/17      COMP METABOLIC PANEL      Result  Value Ref Range    SODIUM 137 133 - 143 mmol/L    POTASSIUM 4.2 3.5 - 5.1 mmol/L    CHLORIDE 104 98 - 107 mmol/L    CO2,TOTAL 25 21 - 31 mmol/L    ANION GAP 8 5 - 18                    GLUCOSE 90 70 - 105 mg/dL    CALCIUM 9.5 8.6 - 10.3 mg/dL    BUN 9 7 - 25 mg/dL    CREATININE 0.76 0.70 - 1.30 mg/dL    BUN/CREAT RATIO           12                    GFR if African American >60 >60 ml/min/1.73m2    GFR if not African American >60 >60 ml/min/1.73m2    ALBUMIN 4.3 3.5 - 5.7 g/dL    PROTEIN,TOTAL 7.6 6.4 - 8.9 g/dL    GLOBULIN                  3.3 2.0 - 3.7 g/dL    A/G RATIO 1.3 1.0 - 2.0                    BILIRUBIN,TOTAL 0.3 0.3 - 1.0 mg/dL    ALK PHOSPHATASE 68 34 - 104 IU/L    ALT (SGPT) 20 7 - 52 IU/L    AST (SGOT) 24 13 - 39 IU/L   CBC WITH AUTO DIFFERENTIAL      Result  Value Ref Range    WHITE BLOOD COUNT         7.0 4.5 - 11.0 thou/cu mm    RED BLOOD COUNT            3.93 (L) 4.00 - 5.20 mil/cu mm    HEMOGLOBIN                12.8 12.0 - 16.0 g/dL    HEMATOCRIT                37.8 33.0 - 51.0 %    MCV                       96 80 - 100 fL    MCH                       32.6 26.0 - 34.0 pg    MCHC                      33.9 32.0 - 36.0 g/dL    RDW                       11.9 11.5 - 15.5 %    PLATELET COUNT            318 140 - 440 thou/cu mm    MPV                       9.0 6.5 - 11.0 fL    NEUTROPHILS               50.1 42.0 - 72.0 %    LYMPHOCYTES               41.6 20.0 - 44.0 %    MONOCYTES                 5.6 <12.0 %    EOSINOPHILS               1.7 <8.0 %    BASOPHILS                 0.7 <3.0 %    IMMATURE GRANULOCYTES(METAS,MYELOS,PROS) 0.3 %    ABSOLUTE NEUTROPHILS      3.5 1.7 - 7.0 thou/cu mm    ABSOLUTE LYMPHOCYTES      2.9 0.9 - 2.9 thou/cu mm    ABSOLUTE MONOCYTES        0.4 <0.9 thou/cu mm    ABSOLUTE EOSINOPHILS      0.1 <0.5 thou/cu mm    ABSOLUTE BASOPHILS        0.1 <0.3 thou/cu mm    ABSOLUTE IMMATURE GRANULOCYTES(METAS,MYELOS,PROS) 0.0 <=0.3 thou/cu mm   URINALYSIS W REFLEX MICROSCOPIC IF POSITIVE      Result  Value Ref Range    COLOR                     Yellow Yellow Color    CLARITY                   Clear Clear Clarity    SPECIFIC GRAVITY,URINE    1.010 1.010, 1.015, 1.020, 1.025                    PH,URINE                  8.0 6.0, 7.0, 8.0, 5.5, 6.5, 7.5, 8.5                    UROBILINOGEN,QUALITATIVE  Normal Normal EU/dl    PROTEIN, URINE Negative Negative mg/dL    GLUCOSE, URINE Negative Negative mg/dL    KETONES,URINE             Negative Negative mg/dL    BILIRUBIN,URINE           Negative Negative                    OCCULT BLOOD,URINE        Negative Negative                    NITRITE                   Negative Negative                    LEUKOCYTE ESTERASE        Negative Negative                       ASSESSEMENT AND PLAN:    No family history of problems with bleeding or anesthetia. Patient is able to tolerate greater than 4 METs of activity without  any cardiopulmonary symptoms. ASA PS class 2 and no cardiopulmonary workup is neccessary for the current procedure. Please contact the office with any questions or concerns.    Radha was seen today for preoperative exam.    Diagnoses and all orders for this visit:    Preop examination  -     CBC AND DIFFERENTIAL; Future  -     COMP METABOLIC PANEL; Future  -     URINALYSIS W REFLEX MICROSCOPIC IF POSITIVE; Future  -     CBC AND DIFFERENTIAL  -     COMP METABOLIC PANEL  -     CBC WITH AUTO DIFFERENTIAL  -     URINALYSIS W REFLEX MICROSCOPIC IF POSITIVE    Cystocele with rectocele    CIGARETTE SMOKER    ANXIETY    Need for influenza vaccination  -     FLU VACCINE => 3 YRS PF QUADRIVALENT IIV4 IM        PRE OP RECOMMENDATIONS:  Patient is on chronic pain medications (NO);   Patient is on antiplatlet/anticoagulation (NO)  Other medications that need adjustment perioperatively (NO)    Agree with Lovenox prophylaxis for 6 weeks starting the day of surgery.     Other:  Patient was advised to call our office and the surgical services with any change in condition or new symptoms if they were to develop between today and their surgical date.  Especially any cardiopulmonary symptoms or symptoms concerning for an infection.    Rashad Adhikari MD

## 2017-12-30 NOTE — NURSING NOTE
"Patient Information     Patient Name MRN Radha Reich [id number] Female 1975      Nursing Note by Leydi Smart at 10/23/2017  3:15 PM     Author:  Leydi Smart Service:  (none) Author Type:  (none)     Filed:  10/23/2017  3:37 PM Encounter Date:  10/23/2017 Status:  Signed     :  Leydi Smart            Date of Surgery: 2017  Type of Surgery: A&P repair and cystoscopy  Surgeon: Dr Mc  [facility]:  [facility]      Fever/Chills or other infectious symptoms in past month: no  >10lb weight loss in past two months: no    Health Care Directive/Code status:  no  Hx of blood transfusions:   (YES)   Td up to date:  yes  History of VRE/MRSA:  (NO)     Preoperative Evaluation: Obstructive Sleep Apnea screening    S: Snore -  Do you snore loudly? (louder than talking or loud enough to be heard through closed doors)(NO)  T: Tired - Do you often feel tired, fatigued, or sleepy during the daytime?(YES)  O: Observed - Has anyone ever observed you stop breathing during your sleep?(NO)  P: Pressure - Do you have or are you being treated for high blood pressure?(NO)  B: BMI - BMI greater than 35kg/m2?(NO)  A: Age - Age over 50 years old?(NO)  N: Neck - Neck circumference greater than 40 cm?(NO)  G: Gender - Gender: Male?(NO)    Total number of \"YES\" responses:  1    Scoring: Low risk of ACE 0-2  At Risk of ACE: >3 High Risk of ACE: 5-  Leydi Smart LPN..................10/23/2017   3:37 PM                "

## 2017-12-30 NOTE — DISCHARGE SUMMARY
"Patient Information     Patient Name MRN Sex Radha Ricci [id number] Female 1975      Discharge Summaries by Shaun Mc MD at 2017  8:03 AM     Author:  Shaun Mc MD Service:  (none) Author Type:  Physician     Filed:  2017  8:04 AM Date of Service:  2017  8:03 AM Status:  Signed     :  Shaun Mc MD (Physician)            HOSPITAL DISCHARGE SUMMARY    Patient Name: Radha Ruiz  YOB: 1975  Age: 42 y.o.  Medical Record Number: [id number]  Primary Physician: Rashad Adhikari MD  Phone: [phone]  Admission Date: 2017  Discharge Date: 2017    She will be discharged from [facility] and [facility] to home.    PRINCIPAL DISCHARGE DIAGNOSIS: s/p vaginal repairs for enterocele and cystocele, rectocele.    Active Problems:    Pelvic relaxation    BRIEF HOSPITAL COURSE: This 42 y.o. female had an uncomplicated procedure. Currently undergoing a trial of voiding. She is on Lovenox for DVT prophylaxis.    PROCEDURES PERFORMED DURING HOSPITALIZATION: vaginal repairs.    COMPLICATIONS IN HOSPITAL: None    PERTINENT FINDINGS/RESULTS AT DISCHARGE: /55  Pulse 77  Temp 97.9  F (36.6  C)  Resp 16  Ht 1.575 m (5' 2\")  Wt 74.1 kg (163 lb 5.8 oz)  LMP 10/11/2012  SpO2 100%  BMI 29.88 kg/m2   Patient Vitals for the past 72 hrs:   Weight   17 1124 74.1 kg (163 lb 5.8 oz)    None    Latest Laboratory Results:  Chem:  Recent Labs       10/23/17   1620   SODIUM  137   POTASSIUM  4.2   CREATININE  0.76     WBC/Hgb:  Recent Labs         17   0444  17   0700  10/23/17   1620   WBC   --    --   7.0   HGB  10.0 L  12.2  12.8     INR:  No results for input(s): INR in the last 720 hours.      IMPORTANT PENDING TEST RESULTS:       These Lab Items may not have been resulted by the time the patient was discharged:            None          CONDITION AT DISCHARGE:    Improving    DISCHARGE ORDERS     Your Home Medicines    "   START taking these medicines       Instructions    docusate 100 mg capsule   For diagnoses:  Pelvic relaxation   Commonly known as:  COLACE    Take 1 capsule by mouth once daily.       enoxaparin 40 mg/0.4 mL injection   For diagnoses:  Pelvic relaxation, History of DVT (deep vein thrombosis)   Start taking on:  11/3/2017   Commonly known as:  LOVENOX    Inject 40 mg subcutaneous once daily.       ibuprofen 200 mg tablet   For diagnoses:  Pelvic relaxation   Commonly known as:  ADVIL; MOTRIN    Take 1-3 tablets by mouth every 6 hours if needed for Pain.       oxyCODONE-acetaminophen (5-325 mg) 5-325 mg per tablet   For diagnoses:  Pelvic relaxation   Commonly known as:  PERCOCET    Take 2 tablets by mouth every 6 hours if needed  for Pain Max acetaminophen dose: 4000mg in 24 hrs.         CONTINUE taking these medicines       Instructions    cetirizine-pseudoephedrine (5-120 mg) 5-120 mg tablet   For diagnoses:  Allergic rhinitis due to pollen, unspecified rhinitis seasonality   Commonly known as:  ZYRTEC-D    Take 1 tablet by mouth 2 times daily.       fluticasone (50 mcg per actuation) nasal spray   For diagnoses:  Allergic rhinitis due to pollen, unspecified rhinitis seasonality   Commonly known as:  FLONASE    INHALE 2 SPRAYS INTO BOTH NOSTRILS ONCE DAILY.       LINZESS 145 mcg Cap capsule   Generic drug:  linaclotide    Take 1 capsule by mouth before breakfast.       traZODone 100 mg tablet   For diagnoses:  Anxiety state, Depression, major, recurrent, in partial remission (HC)   Commonly known as:  DESYREL    TAKE 3-4 TABLETS BY MOUTH AT BEDTIME.   Doctor's comments:  Generic For:DESYREL 100 MG TABLET       venlafaxine 150 mg Extended-Release capsule   For diagnoses:  Anxiety   Commonly known as:  EFFEXOR XR    Take 1 capsule by mouth once daily with a meal.            Where to get your medicines      These medications were sent to [facility] - [place], [place] - [address]  [facility] [address]     Phone:  [phone]      docusate 100 mg capsule     enoxaparin 40 mg/0.4 mL injection     ibuprofen 200 mg tablet          You have received printed prescription(s) for these medicines or supplies. Take these to your preferred pharmacy.     Bring a paper prescription for each of these medications      oxyCODONE-acetaminophen (5-325 mg) 5-325 mg per tablet           After Discharge Orders and Instructions     Additional information about your medicines:       You have been prescribed pain medicine;   - Do not drive any motor vehicles while taking pain medicines that make you sleepy.   - Do not mix any prescribed pain medicine with alcohol.  ACETOMINOPHEN SAFETY:  -  Do not take more than 4,000 milligrams (4 grams) of acetaminophen in 24 hours.  More than that could damage your liver.  - Acetaminophen is also found in cough and cold medicines.         Caring for your incision:       If you have STERI STRIPS , leave the bandages on for one week.  After that, just keep the area clean and dry.          Follow up appointment:       Return to clinic for follow up in 2 weeks.       Moving around after your hospital visit:       Get regular activity and try to walk for a total of 30 minutes per day.  Start by walking for 5 to 10 minutes at one time and slowly build to walking for 30 minutes one time.    Slowly return to your regular level of activity. Save your energy by spreading out activities that make you tired.  Rest as needed.         Primary Care Provider Follow-Up Appointment Within 5 Days or Less       Rashad Adhikari MD             What you may eat and drink after your hospital stay:       Resume your regular diet.       When should you be concerned?       Call your doctor if:  - you develop a temperature of 101 degrees Fahrenheit  - you have nausea and vomiting that will not stop  - you have increased pain that cannot be relieved with rest or pain medicine  - you have  bright red vaginal bleeding that saturates one pad or more per hour. (It is normal to have some vaginal discharge for several weeks. It may vary in color from red to pink to brown to tan.)  -  your incision becomes red, more tender, has increased drainage, or signs of infection (pain, swelling, redness, odor, warmth, green or yellow discharge)  - you have hives (itchy raised rash)  - you have any new pain or swelling in your legs  - you have problems breathing  - you have chest pain that gets worse with deep breathing or coughing  - you have any change in movement (such as new weakness or inability to move as usual)   - you are unable to urinate or have pain or burning when you urinate  - you have any questions of concerns.  In an emergency, call 911 or have someone take you to the nearest hospital Emergency Department.       Why were you at the hospital?       Vaginal repairs                 FOLLOW-UP: She should see Dr. Mc in two weeks.    Specialty follow-up: None    Total time spent for discharge on date of discharge: 10 minutes  I saw the patient on the date of discharge.

## 2018-01-03 NOTE — TELEPHONE ENCOUNTER
Patient Information     Patient Name MRN Sex Radha Ricci [id number] Female 1975      Telephone Encounter by Naveen Mcmahon RN at 2017  8:57 AM     Author:  Naveen Mcmahon RN Service:  (none) Author Type:  NURS- Registered Nurse     Filed:  2017  9:01 AM Encounter Date:  2017 Status:  Signed     :  Naveen Mcmahon RN (NURS- Registered Nurse)            Redundant Refill Request for Zyrtec-D refused;    Prescribing Provider: Rose Adhikari MD                 Order Date: 2017  Ordered by: ROSE ADHIKARI  Medication:cetirizine-pseudoephedrine, 5-120 mg, (ZYRTEC-D) 5-120 mg tablet  Prescription #:[id number]    Qty:48 tablet   Ref:5  Start:2017   End:              Route:Oral                  UCHE:No   Class:Print    Sig:TAKE ONE TABLET BY MOUTH TWICE DAILY    Note to Pharmacy:THE LAST TIME THIS WAS FILLED WAS 17 FOR A 24 DAY                      SUPPLY. BOXES COME IN 24 PACKS, SO MAY WE PLEASE HAVE #48                      TABLETS TO FILL TWO FULL BOXES FOR HER? WE DO NOT HAVE                      ENOUGH SUPPLY REMAINING. THANK YOU!    Pharmacy:[facility] - [place], [place] - [address]    Unable to complete prescription refill per RN Medication Refill Policy.................... Naveen Mcmahon RN ....................  2017   8:58 AM

## 2018-01-03 NOTE — TELEPHONE ENCOUNTER
Patient Information     Patient Name MRN Radha Reich [id number] Female 1975      Telephone Encounter by Naveen Mcmahon RN at 2017  9:08 AM     Author:  Naveen Mcmahon RN Service:  (none) Author Type:  NURS- Registered Nurse     Filed:  2017  9:25 AM Encounter Date:  2017 Status:  Signed     :  Naveen Mcmahon RN (NURS- Registered Nurse)            This is a Refill request from: [facility]  Name of Medication: Zyrtec-D  Quantity requested: 48 tabs with 2 refills  Last fill date: 17 per rx request  Due for refill: No, See below  Last visit with ROSE ADHIKARI was on: 2016 in [facility]  PCP:  Rose Adhikari MD  Controlled Substance Agreement:  N/A   Diagnosis r/t this medication request: Allergic rhinitis    Called and spoke to Radha as refill of requested medication shouldn't be due yet. Rx sent in for 360 day supply on 16. Rx wouldn't be due until approximately 17. [facility] states that they only have enough rx remaining for a 12 day supply on current rx. Last office visit with PCP on 2016 for a physical of which continued use of zyrtec-D was addressed. Patient is coming due for an office visit with PCP. Will send a reminder letter/mychart message to patient as well as will send refill request to PCP for his approval.       Unable to complete prescription refill per RN Medication Refill Policy.................... Navene Mcmahon RN ....................  2017   9:08 AM

## 2018-01-04 NOTE — TELEPHONE ENCOUNTER
Patient Information     Patient Name MRN Radha Reich [id number] Female 1975      Telephone Encounter by Diana Simms RN at 2017  2:53 PM     Author:  Diana Simms RN Service:  (none) Author Type:  NURS- Registered Nurse     Filed:  2017  2:55 PM Encounter Date:  2017 Status:  Signed     :  Diana Simms RN (NURS- Registered Nurse)            Depression-in adults 18 and over  Serotonin/Norepinephrine Reuptake Inhibitors    Office visit in the past 12 months or as indicated in chart.  Should have clinic visit 1-2 months after initial prescription.    Last visit with ROSE ARSHAD was on: 2016 in [facility]  Next visit with ROSE ARSHAD is on: 2017 in [facility]  Next visit with Family Practice is on: 2017 in [facility]    Max refills 12 months from last office visit or per providers notes.    PHQ Depression Screening 2016   Date of PHQ exam (doc flow) 2016   1. Lack of interest/pleasure 0 - Not at all   2. Feeling down/depressed 0 - Not at all   PHQ-2 TOTAL SCORE 0   3. Trouble sleeping 1 - Several days   4. Decreased energy 0 - Not at all   5. Appetite change 0 - Not at all   6. Feelings of failure 0 - Not at all   7. Trouble concentrating 1 - Several days   8. Activity level 0 - Not at all   9. Hurting yourself 0 - Not at all   PHQ-9 TOTAL SCORE 2   PHQ-9 Severity Level none   Functional Impairment not difficult at all     Patient is due for medication management appointment. Limited refill provided at this time and and noted upcoming physical. Prescription refilled per RN Medication Refill Policy.................... Diana Simms RN ....................  2017   2:54 PM

## 2018-01-05 NOTE — TELEPHONE ENCOUNTER
Patient Information     Patient Name MRN Sex Radha Ricci [id number] Female 1975      Telephone Encounter by Georgia Almanzar RN at 2017 10:54 AM     Author:  Georgia Almanzar RN Service:  (none) Author Type:  NURS- Registered Nurse     Filed:  2017 10:57 AM Encounter Date:  2017 Status:  Signed     :  Georgia Almanzar RN (NURS- Registered Nurse)            Nasal Steroids    Office visit in the past 12 months.    Last visit with ROSE ARSHAD was on: 2016 in [facility]  Next visit with ROSE ARSHAD is on: 2017 in [facility]  Next visit with Family Practice is on: 2017 in [facility]    Max refills 12 months from last office visit. Due for exam has appt for 17 will give one bottle pending OV    Prescription refilled per RN Medication Refill Policy.................... GEORGIA ALMANZAR RN ....................  2017   10:56 AM

## 2018-01-05 NOTE — PROGRESS NOTES
Patient Information     Patient Name MRN Sex     Radha Ruiz [id number] Female 1975      Progress Notes by Rashad Adhikari MD at 2017  3:15 PM     Author:  Rashad Adhikari MD Service:  (none) Author Type:  Physician     Filed:  2017  6:36 PM Encounter Date:  2017 Status:  Signed     :  Rashad Adhikari MD (Physician)            There are no exam notes on file for this visit.    SUBJECTIVE:  Radha Ruiz  is a 41 y.o. female who comes in today for complete evaluation. I last saw her a year ago.  She had hysterectomy and has done well. She had colonoscopy 2 years ago and is due again in 4 years.    Her last visit, we elected to leave her Effexor XR at the same dose of 150 mg daily decrease trazodone to 2 tablets at bedtime adding Seroquel 25 mg at bedtime with the idea that we might taper off of the trazodone as we thought it might be contributing to constipation. We discussed a high-fiber diet and introduce lactulose. She is down to 3 trazodone at bedtime. She is no longer on Seroquel.    She is having acupuncture.  She is still having trouble with constipation. She drinks a lot of water a day and did fruit and veggie diet without sugar for 21 days. Now she is taking cascarta.      She is going to wait on mammogram until she is 43.    She has an interview for a possible new job later this evening.     Past Medical, Family, and Social History reviewed and updated as noted below.   ROS is negative except as noted above       Allergies     Allergen  Reactions     Sulfa (Sulfonamide Antibiotics) Hives   ,   Family History       Problem   Relation Age of Onset     Cancer-colon  Father 38     Colon cancer       Psychiatric illness  Mother      Untreated anxiety       Other  Mother      D&C for postmenopausal bleeding benign       Cancer  Maternal Grandfather      Brain       Heart Disease  Maternal Grandfather      MI       Hypertension  Maternal Grandfather      Other  Maternal Uncle       Hemochromatosis       Hypertension  Maternal Grandmother      Other  Maternal Grandmother      Alzheimer's       Diabetes  Other      Diabetes        Heart Disease  Paternal Grandmother      CHF       Cancer  Maternal Uncle      cancer all over.         Cancer  Paternal Uncle      Lung cancer       Cancer-breast  No Family History    ,   No current outpatient prescriptions on file prior to visit.     No current facility-administered medications on file prior to visit.    ,   Past Medical History:     Diagnosis  Date     History of blood transfusion 10/00    History of blood transfusion with       Hx of pregnancy      2, para 1-0-1-1      Pulmonary emboli (HC)     History of pulmonary emboli after       Routine gynecological examination 08    Satisfactory GYN examination     ,   Patient Active Problem List       Diagnosis  Date Noted     Surgical menopause  2015     Melanosis coli  10/06/2014     H/O adenomatous polyp of colon  10/06/2014     FH: colon cancer  2014     Menorrhagia  10/17/2012     TINEA VERSICOLOR  2012     CONSTIPATION       CIGARETTE SMOKER       ANXIETY       ALLERGIC RHINITIS, SEASONAL       MIGRAINE HEADACHE       RESTLESS LEG SYNDROME       PREMENSTRUAL DYSPHORIC SYNDROME       Depression, major, recurrent, in partial remission (HC)  2007     PHQ-9 score 19-3 on 07.        ,   Past Surgical History:      Procedure  Laterality Date      SECTION  10/00    History of blood transfusion with        COLONOSCOPY DIAGNOSTIC  ,,    F/U 2019       CRYOTHERAPY OF CERVIX  1996    Cryosurgery of the cervix        [facility]      Ney Hart MD [facility]       TUBAL LIGATION  2006    and   Social History        Substance Use Topics          Smoking status:   Current Every Day Smoker      Packs/day:  0.13      Types:  Cigarettes      Smokeless tobacco:   Never Used      Alcohol use   Yes      Comment: very rare    "    OBJECTIVE:  /82  Pulse 88  Ht 1.543 m (5' 0.75\")  Wt 67 kg (147 lb 12.8 oz)  LMP 10/11/2012  Breastfeeding? No  BMI 28.16 kg/m2   EXAM:  General Appearance: Pleasant, alert, appropriate appearance for age. No acute distress  Head Exam: Normal. Normocephalic, atraumatic.  Eye Exam: PERRLA, EOMI, conjunctiva, sclerae normal.  Ear Exam: Normal TM's bilaterally. Normal auditory canals and external ears. Non-tender.  Nose Exam: Normal external nose, mucus membranes, and septum.  OroPharynx Exam:  Dental hygiene adequate. Normal buccal mucose. Normal pharynx.  Neck Exam:  Supple, no masses or nodes. No bruits  Thyroid Exam: No nodules or enlargement.  Chest/Respiratory Exam: Normal chest wall and respirations. Clear to auscultation.  Breast Exam: No dimpling, nipple retraction or discharge. No masses or nodes.  Cardiovascular Exam: Regular rate and rhythm. S1, S2, no murmur, click, gallop, or rubs.  Gastrointestinal Exam: Soft, non-tender, no masses or organomegaly.  Lymphatic Exam: Non-palpable nodes in neck, clavicular, axillary, or inguinal regions.  Musculoskeletal Exam: Back is straight and non-tender, full ROM of upper and lower extremities.  Foot Exam: Left and right foot: good pedal pulses,  Skin: no rash or abnormalities  Neurologic Exam:  normal gross motor, tone coordination and no tremor.  Psychiatric Exam: Alert and oriented - appropriate affect.   ASSESSMENT/Plan :      Radha was seen today for physical.    Diagnoses and all orders for this visit:    Visit for preventive health examination    Allergic rhinitis due to pollen, unspecified rhinitis seasonality  -     cetirizine-pseudoephedrine, 5-120 mg, (ZYRTEC-D) 5-120 mg tablet; Take 1 tablet by mouth 2 times daily.  -     fluticasone (50 mcg per actuation) nasal solution (FLONASE); INHALE 2 SPRAYS INTO BOTH NOSTRILS ONCE DAILY.    ANXIETY  -     traZODone (DESYREL) 100 mg tablet; TAKE 3-4 TABLETS BY MOUTH AT BEDTIME.    Depression, major, " recurrent, in partial remission (HC)  -     traZODone (DESYREL) 100 mg tablet; TAKE 3-4 TABLETS BY MOUTH AT BEDTIME.    Anxiety  -     venlafaxine (EFFEXOR XR) 150 mg Extended-Release capsule; Take 1 capsule by mouth once daily with a meal.    Pelvic floor dysfunction  -     AMB CONSULT TO GASTROENTEROLOGY; Future    Chronic constipation  -     AMB CONSULT TO GASTROENTEROLOGY; Future    Continue current medications. Discussed diet, exercise and healthy lifestyle changes.     Referral to [facility] regarding her ongoing constipation and likely pelvic floor dysfunction as a cause of her difficulty with defecation.      Rashad Adhikari MD

## 2018-01-25 ENCOUNTER — COMMUNICATION - GICH (OUTPATIENT)
Dept: FAMILY MEDICINE | Facility: OTHER | Age: 43
End: 2018-01-25

## 2018-01-25 DIAGNOSIS — J30.1 ALLERGIC RHINITIS DUE TO POLLEN: ICD-10-CM

## 2018-01-26 VITALS
WEIGHT: 162 LBS | SYSTOLIC BLOOD PRESSURE: 100 MMHG | SYSTOLIC BLOOD PRESSURE: 116 MMHG | HEIGHT: 61 IN | BODY MASS INDEX: 27.9 KG/M2 | DIASTOLIC BLOOD PRESSURE: 82 MMHG | HEART RATE: 88 BPM | BODY MASS INDEX: 30.61 KG/M2 | TEMPERATURE: 98.5 F | DIASTOLIC BLOOD PRESSURE: 68 MMHG | WEIGHT: 147.8 LBS

## 2018-01-26 VITALS
DIASTOLIC BLOOD PRESSURE: 90 MMHG | HEART RATE: 84 BPM | OXYGEN SATURATION: 99 % | SYSTOLIC BLOOD PRESSURE: 132 MMHG | WEIGHT: 160.6 LBS | HEIGHT: 61 IN | BODY MASS INDEX: 30.32 KG/M2

## 2018-01-26 VITALS
SYSTOLIC BLOOD PRESSURE: 124 MMHG | HEART RATE: 94 BPM | BODY MASS INDEX: 29.07 KG/M2 | HEIGHT: 61 IN | DIASTOLIC BLOOD PRESSURE: 86 MMHG | WEIGHT: 154 LBS

## 2018-01-26 VITALS
HEIGHT: 61 IN | HEART RATE: 82 BPM | SYSTOLIC BLOOD PRESSURE: 132 MMHG | DIASTOLIC BLOOD PRESSURE: 82 MMHG | BODY MASS INDEX: 30.32 KG/M2 | WEIGHT: 160.6 LBS

## 2018-01-28 ASSESSMENT — ANXIETY QUESTIONNAIRES
GAD7 TOTAL SCORE: 0
GAD7 TOTAL SCORE: 2

## 2018-01-28 ASSESSMENT — PATIENT HEALTH QUESTIONNAIRE - PHQ9
SUM OF ALL RESPONSES TO PHQ QUESTIONS 1-9: 0
SUM OF ALL RESPONSES TO PHQ QUESTIONS 1-9: 1
SUM OF ALL RESPONSES TO PHQ QUESTIONS 1-9: 0

## 2018-02-09 VITALS
SYSTOLIC BLOOD PRESSURE: 112 MMHG | HEART RATE: 82 BPM | WEIGHT: 164.8 LBS | DIASTOLIC BLOOD PRESSURE: 78 MMHG | BODY MASS INDEX: 31.4 KG/M2

## 2018-02-13 NOTE — TELEPHONE ENCOUNTER
Patient Information     Patient Name MRN Sex Radha Ricci [id number] Female 1975      Telephone Encounter by Naveen Mcmahon RN at 2018  8:13 AM     Author:  Naveen Mcmahon RN Service:  (none) Author Type:  NURS- Registered Nurse     Filed:  2018  8:43 AM Encounter Date:  2018 Status:  Signed     :  Naveen Mcmahon RN (NURS- Registered Nurse)            Redundant Refill Request for Zyrtec-D refused;    Prescribing Provider: Rose Adhikari MD                   Order Date: 2017  Ordered by: ROSE ADHIKARI  Medication:cetirizine-pseudoephedrine, 5-120 mg, (ZYRTEC-D) 5-120 mg tablet  Prescription #:[id number]    Qty:48 tablet   Ref:5  Start:2017  End:              Route:Oral                  UCHE:No   Class:Print    Sig:TAKE 1 TABLET BY MOUTH TWICE DAILY    Pharmacy:[facility] - [place], [place] - [facility] [address]              [address]    Unable to complete prescription refill per RN Medication Refill Policy.................... Naveen Mcmahon RN ....................  2018   8:42 AM

## 2018-02-28 ENCOUNTER — DOCUMENTATION ONLY (OUTPATIENT)
Dept: FAMILY MEDICINE | Facility: OTHER | Age: 43
End: 2018-02-28

## 2018-02-28 DIAGNOSIS — K58.9 IRRITABLE BOWEL SYNDROME, UNSPECIFIED TYPE: Primary | ICD-10-CM

## 2018-02-28 PROBLEM — N81.89 PELVIC RELAXATION: Status: ACTIVE | Noted: 2017-11-01

## 2018-02-28 PROBLEM — G25.81 RESTLESS LEG SYNDROME: Status: ACTIVE | Noted: 2018-02-28

## 2018-02-28 PROBLEM — F41.1 ANXIETY STATE: Status: ACTIVE | Noted: 2018-02-28

## 2018-02-28 PROBLEM — K59.00 CONSTIPATION: Status: ACTIVE | Noted: 2018-02-28

## 2018-02-28 PROBLEM — F17.200 TOBACCO USE DISORDER: Status: ACTIVE | Noted: 2018-02-28

## 2018-02-28 PROBLEM — J30.1 ALLERGIC RHINITIS DUE TO POLLEN: Status: ACTIVE | Noted: 2018-02-28

## 2018-02-28 PROBLEM — G43.909 MIGRAINE HEADACHE: Status: ACTIVE | Noted: 2018-02-28

## 2018-02-28 RX ORDER — VENLAFAXINE HYDROCHLORIDE 150 MG/1
150 CAPSULE, EXTENDED RELEASE ORAL
COMMUNITY
Start: 2017-05-23 | End: 2018-07-24

## 2018-02-28 RX ORDER — CETIRIZINE HYDROCHLORIDE, PSEUDOEPHEDRINE HYDROCHLORIDE 5; 120 MG/1; MG/1
1 TABLET, FILM COATED, EXTENDED RELEASE ORAL
COMMUNITY
Start: 2017-11-20 | End: 2018-04-07

## 2018-02-28 RX ORDER — IBUPROFEN 200 MG
200-600 TABLET ORAL EVERY 6 HOURS PRN
COMMUNITY
Start: 2017-11-02 | End: 2021-09-23

## 2018-02-28 RX ORDER — FLUTICASONE PROPIONATE 50 MCG
SPRAY, SUSPENSION (ML) NASAL
COMMUNITY
Start: 2017-05-23 | End: 2018-07-02

## 2018-02-28 RX ORDER — TRAZODONE HYDROCHLORIDE 100 MG/1
TABLET ORAL
COMMUNITY
Start: 2017-05-23 | End: 2018-05-29

## 2018-02-28 NOTE — TELEPHONE ENCOUNTER
Chart review shows that rx as requested is noted on patient's active med list. However, last office visit notes on 10/23/17 note that rx as requested is historical in nature. Call placed to patient to discuss and confirm rx as requested. Patient was unavailable at either number as noted in her chart. PCP does state on 10/23/17 that rx as requested was reviewed by him. No changes noted to rx as requested. Writer will ananda up rx as requested and will route this rx request to PCP for his consideration/approval at this time.    Unable to complete prescription refill per RN Medication Refill Policy. Naveen Mcmahon 2/28/2018 9:39 AM

## 2018-03-03 ENCOUNTER — HEALTH MAINTENANCE LETTER (OUTPATIENT)
Age: 43
End: 2018-03-03

## 2018-04-07 DIAGNOSIS — J30.1 CHRONIC ALLERGIC RHINITIS DUE TO POLLEN, UNSPECIFIED SEASONALITY: Primary | ICD-10-CM

## 2018-04-11 PROBLEM — J30.1 CHRONIC ALLERGIC RHINITIS DUE TO POLLEN, UNSPECIFIED SEASONALITY: Status: ACTIVE | Noted: 2018-04-11

## 2018-04-11 RX ORDER — CETIRIZINE HYDROCHLORIDE, PSEUDOEPHEDRINE HYDROCHLORIDE 5; 120 MG/1; MG/1
TABLET, FILM COATED, EXTENDED RELEASE ORAL
Qty: 60 TABLET | Refills: 5 | Status: SHIPPED | OUTPATIENT
Start: 2018-04-11 | End: 2018-08-29

## 2018-05-29 DIAGNOSIS — F41.1 ANXIETY STATE: Primary | ICD-10-CM

## 2018-05-29 DIAGNOSIS — F33.41 RECURRENT MAJOR DEPRESSION IN PARTIAL REMISSION (H): ICD-10-CM

## 2018-06-04 RX ORDER — TRAZODONE HYDROCHLORIDE 100 MG/1
TABLET ORAL
Qty: 360 TABLET | Refills: 0 | Status: SHIPPED | OUTPATIENT
Start: 2018-06-04 | End: 2018-08-29

## 2018-06-04 NOTE — TELEPHONE ENCOUNTER
LOV with PCP-10/23/17 for a preop. Rx as requested is noted and reviewed by PCP as per office visit notes on that date. No changes noted and no specific follow up is noted. Writer will refill Rx as requested at this time.    Prescription refilled per RN Medication Refill Policy..................Naveen Mcmahon 6/4/2018 11:15 AM

## 2018-07-02 DIAGNOSIS — J30.1 ALLERGIC RHINITIS DUE TO POLLEN, UNSPECIFIED CHRONICITY, UNSPECIFIED SEASONALITY: Primary | ICD-10-CM

## 2018-07-05 RX ORDER — FLUTICASONE PROPIONATE 50 MCG
SPRAY, SUSPENSION (ML) NASAL
Qty: 3 BOTTLE | Refills: 0 | Status: SHIPPED | OUTPATIENT
Start: 2018-07-05 | End: 2019-06-24

## 2018-07-05 NOTE — TELEPHONE ENCOUNTER
LOV with PCP was for a preop on 10/23/17. Rx as requested from pharmacy was noted and reviewed by PCP on that date as per office visit notes. No changes noted. Writer will refill Rx as requested for a 90 day supply at this time. Patient will be due for annual office visit with PCP in October 2018.    Prescription refilled per RN Medication Refill Policy..................Naveen Mcmahon 7/5/2018 9:13 AM

## 2018-07-24 DIAGNOSIS — F41.1 ANXIETY STATE: ICD-10-CM

## 2018-07-24 DIAGNOSIS — F33.41 DEPRESSION, MAJOR, RECURRENT, IN PARTIAL REMISSION (H): Primary | ICD-10-CM

## 2018-07-24 NOTE — PROGRESS NOTES
Patient Information     Patient Name  Radha Ruiz MRN  [id number] Sex  Female   1975      Letter by Rashad Adhikari MD at      Author:  Rashad Adhikari MD Service:  (none) Author Type:  (none)    Filed:   Encounter Date:  2017 Status:  (Other)       [facility]  Department of Gastroenterology  [address] [place]  [address]    RE:Radha Ruiz (:1975)  [address] [place] [id number]          May 23, 2017    Dear Doctors:    This note will introduce you to Radha Ruiz a healthy 41-year-old lady who has ongoing troubles with chronic constipation that I believe is due to pelvic floor dysfunction. I think she may benefit from evaluation for pelvic floor disorder. She has a family history of colon cancer but is up-to-date on colonoscopies this had 2 negative colonoscopies thus far most recently in .    She's been tried on a variety of stool softeners, fiber supplements, MiraLAX, lactulose, and various laxatives of her own choosing but still has soft stools and a difficult time in evacuating.    Thank you for your help with this pleasant lady. If I can answer any questions or be of any further assistance, please don't hesitate to contact me.    Sincerely,          Rashad Adhikari MD

## 2018-07-26 RX ORDER — VENLAFAXINE HYDROCHLORIDE 150 MG/1
150 CAPSULE, EXTENDED RELEASE ORAL
Qty: 90 CAPSULE | Refills: 3 | Status: SHIPPED | OUTPATIENT
Start: 2018-07-26 | End: 2018-08-29

## 2018-07-26 NOTE — TELEPHONE ENCOUNTER
RN refill protocol fails as noted below:     PHQ-9 score of less than 5 in past 6 months    Recent (6 mo) or future (30 days) visit within the authorizing provider's specialty     LOV with PCP was on 10/23/17 for a preop. Writer will ananda up and route Rx request to PCP for his consideration/approval at this time.    Unable to complete prescription refill per RN Medication Refill Policy. Naveen Mcmahon 7/26/2018 4:37 PM

## 2018-08-29 ENCOUNTER — OFFICE VISIT (OUTPATIENT)
Dept: FAMILY MEDICINE | Facility: OTHER | Age: 43
End: 2018-08-29
Attending: FAMILY MEDICINE
Payer: COMMERCIAL

## 2018-08-29 VITALS
HEART RATE: 80 BPM | BODY MASS INDEX: 31.45 KG/M2 | SYSTOLIC BLOOD PRESSURE: 112 MMHG | HEIGHT: 61 IN | DIASTOLIC BLOOD PRESSURE: 80 MMHG | WEIGHT: 166.6 LBS

## 2018-08-29 DIAGNOSIS — Z12.31 VISIT FOR SCREENING MAMMOGRAM: ICD-10-CM

## 2018-08-29 DIAGNOSIS — J30.9 CHRONIC ALLERGIC RHINITIS, UNSPECIFIED SEASONALITY, UNSPECIFIED TRIGGER: ICD-10-CM

## 2018-08-29 DIAGNOSIS — F41.1 ANXIETY STATE: ICD-10-CM

## 2018-08-29 DIAGNOSIS — Z00.00 VISIT FOR PREVENTIVE HEALTH EXAMINATION: Primary | ICD-10-CM

## 2018-08-29 DIAGNOSIS — F33.41 RECURRENT MAJOR DEPRESSION IN PARTIAL REMISSION (H): ICD-10-CM

## 2018-08-29 DIAGNOSIS — Z72.0 TOBACCO ABUSE: ICD-10-CM

## 2018-08-29 DIAGNOSIS — Z13.0 SCREENING FOR DEFICIENCY ANEMIA: ICD-10-CM

## 2018-08-29 DIAGNOSIS — Z13.29 SCREENING FOR HYPOTHYROIDISM: ICD-10-CM

## 2018-08-29 DIAGNOSIS — J30.1 CHRONIC ALLERGIC RHINITIS DUE TO POLLEN, UNSPECIFIED SEASONALITY: ICD-10-CM

## 2018-08-29 DIAGNOSIS — E66.811 CLASS 1 OBESITY WITHOUT SERIOUS COMORBIDITY WITH BODY MASS INDEX (BMI) OF 31.0 TO 31.9 IN ADULT, UNSPECIFIED OBESITY TYPE: ICD-10-CM

## 2018-08-29 DIAGNOSIS — K58.9 IRRITABLE BOWEL SYNDROME, UNSPECIFIED TYPE: ICD-10-CM

## 2018-08-29 DIAGNOSIS — Z13.228 SCREENING FOR METABOLIC DISORDER: ICD-10-CM

## 2018-08-29 DIAGNOSIS — Z13.220 SCREENING FOR HYPERLIPIDEMIA: ICD-10-CM

## 2018-08-29 DIAGNOSIS — F33.41 DEPRESSION, MAJOR, RECURRENT, IN PARTIAL REMISSION (H): ICD-10-CM

## 2018-08-29 LAB
ALBUMIN SERPL-MCNC: 4.6 G/DL (ref 3.5–5.7)
ALP SERPL-CCNC: 57 U/L (ref 34–104)
ALT SERPL W P-5'-P-CCNC: 13 U/L (ref 7–52)
ANION GAP SERPL CALCULATED.3IONS-SCNC: 6 MMOL/L (ref 3–14)
AST SERPL W P-5'-P-CCNC: 23 U/L (ref 13–39)
BASOPHILS # BLD AUTO: 0 10E9/L (ref 0–0.2)
BASOPHILS NFR BLD AUTO: 0.6 %
BILIRUB SERPL-MCNC: 0.3 MG/DL (ref 0.3–1)
BUN SERPL-MCNC: 15 MG/DL (ref 7–25)
CALCIUM SERPL-MCNC: 9.8 MG/DL (ref 8.6–10.3)
CHLORIDE SERPL-SCNC: 107 MMOL/L (ref 98–107)
CHOLEST SERPL-MCNC: 176 MG/DL
CO2 SERPL-SCNC: 27 MMOL/L (ref 21–31)
CREAT SERPL-MCNC: 0.83 MG/DL (ref 0.6–1.2)
DIFFERENTIAL METHOD BLD: ABNORMAL
EOSINOPHIL # BLD AUTO: 0.1 10E9/L (ref 0–0.7)
EOSINOPHIL NFR BLD AUTO: 0.9 %
ERYTHROCYTE [DISTWIDTH] IN BLOOD BY AUTOMATED COUNT: 12.2 % (ref 10–15)
GFR SERPL CREATININE-BSD FRML MDRD: 74 ML/MIN/1.7M2
GLUCOSE SERPL-MCNC: 98 MG/DL (ref 70–105)
HCT VFR BLD AUTO: 39 % (ref 35–47)
HDLC SERPL-MCNC: 59 MG/DL (ref 23–92)
HGB BLD-MCNC: 13.2 G/DL (ref 11.7–15.7)
IMM GRANULOCYTES # BLD: 0 10E9/L (ref 0–0.4)
IMM GRANULOCYTES NFR BLD: 0.3 %
LDLC SERPL CALC-MCNC: 102 MG/DL
LYMPHOCYTES # BLD AUTO: 2 10E9/L (ref 0.8–5.3)
LYMPHOCYTES NFR BLD AUTO: 30 %
MCH RBC QN AUTO: 33.2 PG (ref 26.5–33)
MCHC RBC AUTO-ENTMCNC: 33.8 G/DL (ref 31.5–36.5)
MCV RBC AUTO: 98 FL (ref 78–100)
MONOCYTES # BLD AUTO: 0.3 10E9/L (ref 0–1.3)
MONOCYTES NFR BLD AUTO: 5.2 %
NEUTROPHILS # BLD AUTO: 4.1 10E9/L (ref 1.6–8.3)
NEUTROPHILS NFR BLD AUTO: 63 %
NONHDLC SERPL-MCNC: 117 MG/DL
PLATELET # BLD AUTO: 262 10E9/L (ref 150–450)
POTASSIUM SERPL-SCNC: 4.5 MMOL/L (ref 3.5–5.1)
PROT SERPL-MCNC: 7.2 G/DL (ref 6.4–8.9)
RBC # BLD AUTO: 3.97 10E12/L (ref 3.8–5.2)
SODIUM SERPL-SCNC: 140 MMOL/L (ref 134–144)
TRIGL SERPL-MCNC: 75 MG/DL
TSH SERPL DL<=0.05 MIU/L-ACNC: 0.91 IU/ML (ref 0.34–5.6)
WBC # BLD AUTO: 6.6 10E9/L (ref 4–11)

## 2018-08-29 PROCEDURE — 36415 COLL VENOUS BLD VENIPUNCTURE: CPT | Performed by: FAMILY MEDICINE

## 2018-08-29 PROCEDURE — 80053 COMPREHEN METABOLIC PANEL: CPT | Performed by: FAMILY MEDICINE

## 2018-08-29 PROCEDURE — 80061 LIPID PANEL: CPT | Performed by: FAMILY MEDICINE

## 2018-08-29 PROCEDURE — 85025 COMPLETE CBC W/AUTO DIFF WBC: CPT | Performed by: FAMILY MEDICINE

## 2018-08-29 PROCEDURE — 84443 ASSAY THYROID STIM HORMONE: CPT | Performed by: FAMILY MEDICINE

## 2018-08-29 PROCEDURE — 99396 PREV VISIT EST AGE 40-64: CPT | Performed by: FAMILY MEDICINE

## 2018-08-29 RX ORDER — MONTELUKAST SODIUM 10 MG/1
10 TABLET ORAL AT BEDTIME
Qty: 90 TABLET | Refills: 3 | Status: SHIPPED | OUTPATIENT
Start: 2018-08-29 | End: 2019-10-04

## 2018-08-29 RX ORDER — DOCUSATE SODIUM 100 MG/1
TABLET ORAL
Refills: 0 | COMMUNITY
Start: 2017-11-02 | End: 2020-08-03

## 2018-08-29 RX ORDER — CETIRIZINE HYDROCHLORIDE, PSEUDOEPHEDRINE HYDROCHLORIDE 5; 120 MG/1; MG/1
1 TABLET, FILM COATED, EXTENDED RELEASE ORAL 2 TIMES DAILY
Qty: 180 TABLET | Refills: 5 | Status: SHIPPED | OUTPATIENT
Start: 2018-08-29 | End: 2019-06-24

## 2018-08-29 RX ORDER — TRAZODONE HYDROCHLORIDE 100 MG/1
TABLET ORAL
Qty: 360 TABLET | Refills: 3 | Status: SHIPPED | OUTPATIENT
Start: 2018-08-29 | End: 2019-06-24

## 2018-08-29 RX ORDER — OXYCODONE AND ACETAMINOPHEN 5; 325 MG/1; MG/1
TABLET ORAL
Refills: 0 | COMMUNITY
Start: 2017-11-02 | End: 2019-06-24

## 2018-08-29 RX ORDER — VENLAFAXINE HYDROCHLORIDE 150 MG/1
150 CAPSULE, EXTENDED RELEASE ORAL
Qty: 90 CAPSULE | Refills: 3 | Status: SHIPPED | OUTPATIENT
Start: 2018-08-29 | End: 2019-06-24

## 2018-08-29 ASSESSMENT — ANXIETY QUESTIONNAIRES
5. BEING SO RESTLESS THAT IT IS HARD TO SIT STILL: NOT AT ALL
7. FEELING AFRAID AS IF SOMETHING AWFUL MIGHT HAPPEN: NOT AT ALL
IF YOU CHECKED OFF ANY PROBLEMS ON THIS QUESTIONNAIRE, HOW DIFFICULT HAVE THESE PROBLEMS MADE IT FOR YOU TO DO YOUR WORK, TAKE CARE OF THINGS AT HOME, OR GET ALONG WITH OTHER PEOPLE: SOMEWHAT DIFFICULT
2. NOT BEING ABLE TO STOP OR CONTROL WORRYING: SEVERAL DAYS
6. BECOMING EASILY ANNOYED OR IRRITABLE: MORE THAN HALF THE DAYS
GAD7 TOTAL SCORE: 6
1. FEELING NERVOUS, ANXIOUS, OR ON EDGE: MORE THAN HALF THE DAYS
3. WORRYING TOO MUCH ABOUT DIFFERENT THINGS: SEVERAL DAYS

## 2018-08-29 ASSESSMENT — PATIENT HEALTH QUESTIONNAIRE - PHQ9: 5. POOR APPETITE OR OVEREATING: NOT AT ALL

## 2018-08-29 ASSESSMENT — PAIN SCALES - GENERAL: PAINLEVEL: NO PAIN (0)

## 2018-08-29 NOTE — NURSING NOTE
"Chief Complaint   Patient presents with     Physical       Initial /80 (BP Location: Left arm, Patient Position: Sitting, Cuff Size: Adult Regular)  Pulse 80  Ht 5' 1\" (1.549 m)  Wt 166 lb 9.6 oz (75.6 kg)  Breastfeeding? No  BMI 31.48 kg/m2 Estimated body mass index is 31.48 kg/(m^2) as calculated from the following:    Height as of this encounter: 5' 1\" (1.549 m).    Weight as of this encounter: 166 lb 9.6 oz (75.6 kg).  Medication Reconciliation: complete    Gina Russell    "

## 2018-08-29 NOTE — MR AVS SNAPSHOT
After Visit Summary   8/29/2018    Radha Ruiz    MRN: [id number]           Patient Information     Date Of Birth          1975        Visit Information        Provider Department      8/29/2018 9:30 AM Rashad Adhikari MD [facility]        Today's Diagnoses     Visit for preventive health examination    -  1    Chronic allergic rhinitis due to pollen, unspecified seasonality        Irritable bowel syndrome, unspecified type        Anxiety state        Recurrent major depression in partial remission (H)        Depression, major, recurrent, in partial remission (H)        Screening for deficiency anemia        Screening for metabolic disorder        Screening for hyperlipidemia        Tobacco abuse        Visit for screening mammogram        Screening for hypothyroidism        Class 1 obesity without serious comorbidity with body mass index (BMI) of 31.0 to 31.9 in adult, unspecified obesity type        Chronic allergic rhinitis, unspecified seasonality, unspecified trigger           Follow-ups after your visit        Additional Services     BARIATRIC ADULT REFERRAL       Your provider has referred you to: Kem You MD  [facility] Medical Weight Management    Please be aware that coverage of these services is subject to the terms and limitations of your health insurance plan.  Call member services at your health plan with any benefit or coverage questions.      Please bring the following with you to your appointment:      (1) List of current medications   (2) This referral request   (3) Any documents/labs given to you for this referral                  Future tests that were ordered for you today     Open Future Orders        Priority Expected Expires Ordered    MA Screening Digital Bilateral Routine  8/29/2019 8/29/2018            Who to contact     If you have questions or need follow up information about today's clinic visit or your schedule please contact   "[facility] directly at [phone].  Normal or non-critical lab and imaging results will be communicated to you by MyChart, letter or phone within 4 business days after the clinic has received the results. If you do not hear from us within 7 days, please contact the clinic through Zulamahart or phone. If you have a critical or abnormal lab result, we will notify you by phone as soon as possible.  Submit refill requests through RedCritter or call your pharmacy and they will forward the refill request to us. Please allow 3 business days for your refill to be completed.          Additional Information About Your Visit        ZulamaharPict Information     RedCritter lets you send messages to your doctor, view your test results, renew your prescriptions, schedule appointments and more. To sign up, go to [email or web address] . Click on \"Log in\" on the left side of the screen, which will take you to the Welcome page. Then click on \"Sign up Now\" on the right side of the page.     You will be asked to enter the access code listed below, as well as some personal information. Please follow the directions to create your username and password.     Your access code is: [email or web address]  Expires: 2018 12:25 PM     Your access code will  in 90 days. If you need help or a new code, please call your [facility] clinic or [phone].        Care EveryWhere ID     This is your Care EveryWhere ID. This could be used by other organizations to access your [facility] medical records  [id number]        Your Vitals Were     Pulse Height Breastfeeding? BMI (Body Mass Index)          80 5' 1\" (1.549 m) No 31.48 kg/m2         Blood Pressure from Last 3 Encounters:   18 112/80   17 112/78   17 100/68    Weight from Last 3 Encounters:   18 166 lb 9.6 oz (75.6 kg)   17 164 lb 12.8 oz (74.8 kg)   17 162 lb (73.5 kg)              We Performed the Following     BARIATRIC ADULT REFERRAL     CBC with " platelets differential     Comprehensive metabolic panel     Lipid Profile     Thyrotropin GH          Today's Medication Changes          These changes are accurate as of 8/29/18 12:25 PM.  If you have any questions, ask your nurse or doctor.               Start taking these medicines.        Dose/Directions    montelukast 10 MG tablet   Commonly known as:  SINGULAIR   Used for:  Chronic allergic rhinitis, unspecified seasonality, unspecified trigger   Started by:  Rashad Adhikari MD        Dose:  10 mg   Take 1 tablet (10 mg) by mouth At Bedtime   Quantity:  90 tablet   Refills:  3         These medicines have changed or have updated prescriptions.        Dose/Directions    cetirizine-pseudoePHEDrine 5-120 MG per 12 hr tablet   Commonly known as:  zyrTEC-D   This may have changed:  See the new instructions.   Used for:  Chronic allergic rhinitis due to pollen, unspecified seasonality   Changed by:  Rashad Adhikari MD        Dose:  1 tablet   Take 1 tablet by mouth 2 times daily   Quantity:  180 tablet   Refills:  5            Where to get your medicines      These medications were sent to [place] [address] [place] [facility] [address] [facility]  [address] [facility] [address]     Phone:  [phone]     linaclotide 145 MCG capsule    montelukast 10 MG tablet    traZODone 100 MG tablet    venlafaxine 150 MG 24 hr capsule         Some of these will need a paper prescription and others can be bought over the counter.  Ask your nurse if you have questions.     Bring a paper prescription for each of these medications     cetirizine-pseudoePHEDrine 5-120 MG per 12 hr tablet               Information about OPIOIDS     PRESCRIPTION OPIOIDS: WHAT YOU NEED TO KNOW   We gave you an opioid (narcotic) pain medicine. It is important to manage your pain, but opioids are not always the best choice. You should first try all the other options your care team gave you. Take this medicine for as short a time  (and as few doses) as possible.    Some activities can increase your pain, such as bandage changes or therapy sessions. It may help to take your pain medicine 30 to 60 minutes before these activities. Reduce your stress by getting enough sleep, working on hobbies you enjoy and practicing relaxation or meditation. Talk to your care team about ways to manage your pain beyond prescription opioids.    These medicines have risks:    DO NOT drive when on new or higher doses of pain medicine. These medicines can affect your alertness and reaction times, and you could be arrested for driving under the influence (DUI). If you need to use opioids long-term, talk to your care team about driving.    DO NOT operate heavy machinery    DO NOT do any other dangerous activities while taking these medicines.    DO NOT drink any alcohol while taking these medicines.     If the opioid prescribed includes acetaminophen, DO NOT take with any other medicines that contain acetaminophen. Read all labels carefully. Look for the word  acetaminophen  or  Tylenol.  Ask your pharmacist if you have questions or are unsure.    You can get addicted to pain medicines, especially if you have a history of addiction (chemical, alcohol or substance dependence). Talk to your care team about ways to reduce this risk.    All opioids tend to cause constipation. Drink plenty of water and eat foods that have a lot of fiber, such as fruits, vegetables, prune juice, apple juice and high-fiber cereal. Take a laxative (Miralax, milk of magnesia, Colace, Senna) if you don t move your bowels at least every other day. Other side effects include upset stomach, sleepiness, dizziness, throwing up, tolerance (needing more of the medicine to have the same effect), physical dependence and slowed breathing.    Store your pills in a secure place, locked if possible. We will not replace any lost or stolen medicine. If you don t finish your medicine, please throw away  (dispose) as directed by your pharmacist. The [place] Pollution Control Agency has more information about safe disposal: [email or web address]         Primary Care Provider Office Phone # Fax #    Rashad DORSEY MD [place] [phone]       [address]  [place] [address]        Equal Access to Services     [facility]: Hadii aad ku hadasho Soomaali, waaxda luqadaha, qaybta kaalmada adeegyada, waxay idiin hayaan adeeg kharash laKrysaan . So [facility] [phone].    ATENCIÓN: Si habla espkaden, tiene a khan disposición servicios gratuitos de asistencia lingüística. Llame al [phone].    We comply with applicable federal civil rights laws and [place] laws. We do not discriminate on the basis of race, color, national origin, age, disability, sex, sexual orientation, or gender identity.            Thank you!     Thank you for choosing [facility] \[facility]\""  for your care. Our goal is always to provide you with excellent care. Hearing back from our patients is one way we can continue to improve our services. Please take a few minutes to complete the written survey that you may receive in the mail after your visit with us. Thank you!             Your Updated Medication List - Protect others around you: Learn how to safely use, store and throw away your medicines at www.disposemymeds.org.          This list is accurate as of 8/29/18 12:25 PM.  Always use your most recent med list.                   Brand Name Dispense Instructions for use Diagnosis    cetirizine-pseudoePHEDrine 5-120 MG per 12 hr tablet    zyrTEC-D    180 tablet    Take 1 tablet by mouth 2 times daily    Chronic allergic rhinitis due to pollen, unspecified seasonality       DOCQLACE 100 MG capsule   Generic drug:  docusate sodium      TAKE 1 CAPSULE BY MOUTH EVERY DAY        enoxaparin 40 MG/0.4ML injection    LOVENOX     INJECT 40 MG SUBCUTANEOUS ONCE DAILY         fluticasone 50 MCG/ACT spray    FLONASE    3 Bottle    INHALE 2 SPRAYS INTO BOTH NOSTRILS ONCE DAILY.    Allergic rhinitis due to pollen, unspecified chronicity, unspecified seasonality       ibuprofen 200 MG tablet    ADVIL/MOTRIN     Take 200-600 mg by mouth        linaclotide 145 MCG capsule    LINZESS    90 capsule    Take 1 capsule (145 mcg) by mouth daily    Irritable bowel syndrome, unspecified type       montelukast 10 MG tablet    SINGULAIR    90 tablet    Take 1 tablet (10 mg) by mouth At Bedtime    Chronic allergic rhinitis, unspecified seasonality, unspecified trigger       oxyCODONE-acetaminophen 5-325 MG per tablet    PERCOCET     TAKE 2 TABLETS BY MOUTH EVERY 6 HOURS IF NEEDED MAX ACETAMINOPHEN DOSE 4000MG IN 24 HOURS        traZODone 100 MG tablet    DESYREL    360 tablet    TAKE 3-4 TABLETS BY MOUTH AT BEDTIME.    Anxiety state, Recurrent major depression in partial remission (H)       venlafaxine 150 MG 24 hr capsule    EFFEXOR-XR    90 capsule    Take 1 capsule (150 mg) by mouth daily with food    Anxiety state, Depression, major, recurrent, in partial remission (H)

## 2018-08-29 NOTE — PROGRESS NOTES
"Nursing Notes:   Gina Russell  8/29/2018 10:12 AM  Signed  Chief Complaint   Patient presents with     Physical       Initial /80 (BP Location: Left arm, Patient Position: Sitting, Cuff Size: Adult Regular)  Pulse 80  Ht 5' 1\" (1.549 m)  Wt 166 lb 9.6 oz (75.6 kg)  Breastfeeding? No  BMI 31.48 kg/m2 Estimated body mass index is 31.48 kg/(m^2) as calculated from the following:    Height as of this encounter: 5' 1\" (1.549 m).    Weight as of this encounter: 166 lb 9.6 oz (75.6 kg).  Medication Reconciliation: complete    Gina Russell      SUBJECTIVE:  Radha Ruiz  is a 43 year old female who comes in today for complete evaluation.    She is due for mammogram.  She is due for colonoscopy next year.  She is up-to-date on immunizations.    She had rectocele repair and vaginal vault prolapse repair done last year by Dr. Mc.    She continues on Effexor XR and trazodone.        PHQ-9 SCORE 8/9/2017 10/23/2017 8/29/2018   Total Score 0 0 4     ERMELINDA-7 SCORE 5/23/2017 8/9/2017 8/29/2018   Total Score 2 0 6           Past Medical, Family, and Social History reviewed and updated as noted below.   ROS is negative except as noted above       Allergies   Allergen Reactions     Sulfa Drugs Hives   ,   Family History   Problem Relation Age of Onset     Colon Cancer Father 38     Cancer-colon,Colon cancer     Other - See Comments Mother      Psychiatric illness,Untreated anxiety     Other - See Comments Mother      D&C for postmenopausal bleeding benign     Cancer Maternal Grandfather      Cancer,Brain     HEART DISEASE Maternal Grandfather      Heart Disease,MI     Hypertension Maternal Grandfather      Hypertension     Other - See Comments Maternal Uncle      Hemochromatosis     Hypertension Maternal Grandmother      Hypertension     Other - See Comments Maternal Grandmother      Alzheimer's     Diabetes Other      Diabetes,Diabetes     HEART DISEASE Paternal Grandmother      Heart Disease,CHF     Cancer Maternal " Uncle      Cancer,cancer all over.     Cancer Paternal Uncle      Cancer,Lung cancer     Breast Cancer No family hx of      Cancer-breast   ,   Current Outpatient Prescriptions   Medication     cetirizine-pseudoePHEDrine (ZYRTEC-D) 5-120 MG per 12 hr tablet     fluticasone (FLONASE) 50 MCG/ACT spray     ibuprofen (ADVIL/MOTRIN) 200 MG tablet     linaclotide (LINZESS) 145 MCG capsule     montelukast (SINGULAIR) 10 MG tablet     traZODone (DESYREL) 100 MG tablet     venlafaxine (EFFEXOR-XR) 150 MG 24 hr capsule     DOCQLACE 100 MG capsule     enoxaparin (LOVENOX) 40 MG/0.4ML injection     oxyCODONE-acetaminophen (PERCOCET) 5-325 MG per tablet     [DISCONTINUED] traZODone (DESYREL) 100 MG tablet     [DISCONTINUED] venlafaxine (EFFEXOR-XR) 150 MG 24 hr capsule     No current facility-administered medications for this visit.    ,   Past Medical History:   Diagnosis Date     Encounter for gynecological examination without abnormal finding     08,Satisfactory GYN examination     Other pulmonary embolism without acute cor pulmonale (H)     History of pulmonary emboli after      Personal history of other medical treatment (CODE)      2, para 1-0-1-1     Personal history of other medical treatment (CODE)     10/00,History of blood transfusion with    ,   Patient Active Problem List    Diagnosis Date Noted     Tobacco abuse 2018     Priority: Medium     Chronic allergic rhinitis due to pollen, unspecified seasonality 2018     Priority: Medium     Anxiety state 2018     Priority: Medium     Constipation 2018     Priority: Medium     Migraine headache 2018     Priority: Medium     Restless leg syndrome 2018     Priority: Medium     Surgical menopause 2015     Priority: Medium     H/O adenomatous polyp of colon 10/06/2014     Priority: Medium     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     ,   Past Surgical History:   Procedure Laterality Date     ant/post colporr w enterocele incl cysturethroscopy  2017    Dr. Mc      SECTION      10/00 /06,History of blood transfusion with      COLONOSCOPY      ,,,F/U 2019     Cryotherapy of Cervix       LAPAROSCOPIC ASSISTED HYSTERECTOMY VAGINAL, BILATERAL SALPINGO-OOPHORECTOMY, COMBINED      Ney Hart MD [facility]     LAPAROSCOPIC TUBAL LIGATION          and   Social History   Substance Use Topics     Smoking status: Current Every Day Smoker     Packs/day: 0.13     Types: Cigarettes     Smokeless tobacco: Never Used     Alcohol use Yes      Comment: Alcoholic Drinks/day: very rare     OBJECTIVE:  /80 (BP Location: Left arm, Patient Position: Sitting, Cuff Size: Adult Regular)  Pulse 80  Ht 5' 1\" (1.549 m)  Wt 166 lb 9.6 oz (75.6 kg)  Breastfeeding? No  BMI 31.48 kg/m2   EXAM:  General Appearance: Pleasant, alert, appropriate appearance for age. No acute distress  Head Exam: Normal. Normocephalic, atraumatic.  Eye Exam: PERRLA, EOMI, conjunctiva, sclerae normal.  Ear Exam: Normal TM's bilaterally. Normal auditory canals and externalears. Non-tender.  Nose Exam: Normal external nose, mucus membranes, and septum.  OroPharynx Exam:  Dental hygiene adequate. Normal buccal mucosa. Normal pharynx.  Neck Exam:  Supple, no masses or nodes. No bruits  Thyroid Exam: No nodules or enlargement.  Chest/Respiratory Exam: Normal chest wall and respirations. Clear to auscultation.  Breast Exam: No dimpling, nipple retraction or discharge. No masses or nodes.  Cardiovascular Exam: Regular rate and rhythm. S1, S2, no murmur, click, gallop, or rubs.  Gastrointestinal Exam: Soft, non-tender, no masses or organomegaly.  Lymphatic Exam: " Non-palpable nodes in neck,clavicular, axillary, or inguinal regions.  Musculoskeletal Exam: Back is straight and non-tender, full ROM of upper and lower extremities.  Foot Exam: Left and right foot: good pedal pulses  Skin: no rash or abnormalities  Neurologic Exam:  normal gross motor, tone coordination and no tremor.  Psychiatric Exam: Alert and oriented - appropriate affect.     Results for orders placed or performed in visit on 08/29/18   CBC with platelets differential   Result Value Ref Range    WBC 6.6 4.0 - 11.0 10e9/L    RBC Count 3.97 3.8 - 5.2 10e12/L    Hemoglobin 13.2 11.7 - 15.7 g/dL    Hematocrit 39.0 35.0 - 47.0 %    MCV 98 78 - 100 fl    MCH 33.2 (H) 26.5 - 33.0 pg    MCHC 33.8 31.5 - 36.5 g/dL    RDW 12.2 10.0 - 15.0 %    Platelet Count 262 150 - 450 10e9/L    Diff Method Automated Method     % Neutrophils 63.0 %    % Lymphocytes 30.0 %    % Monocytes 5.2 %    % Eosinophils 0.9 %    % Basophils 0.6 %    % Immature Granulocytes 0.3 %    Absolute Neutrophil 4.1 1.6 - 8.3 10e9/L    Absolute Lymphocytes 2.0 0.8 - 5.3 10e9/L    Absolute Monocytes 0.3 0.0 - 1.3 10e9/L    Absolute Eosinophils 0.1 0.0 - 0.7 10e9/L    Absolute Basophils 0.0 0.0 - 0.2 10e9/L    Abs Immature Granulocytes 0.0 0 - 0.4 10e9/L   Comprehensive metabolic panel   Result Value Ref Range    Sodium 140 134 - 144 mmol/L    Potassium 4.5 3.5 - 5.1 mmol/L    Chloride 107 98 - 107 mmol/L    Carbon Dioxide 27 21 - 31 mmol/L    Anion Gap 6 3 - 14 mmol/L    Glucose 98 70 - 105 mg/dL    Urea Nitrogen 15 7 - 25 mg/dL    Creatinine 0.83 0.60 - 1.20 mg/dL    GFR Estimate 74 >60 mL/min/1.7m2    GFR Estimate If Black 90 >60 mL/min/1.7m2    Calcium 9.8 8.6 - 10.3 mg/dL    Bilirubin Total 0.3 0.3 - 1.0 mg/dL    Albumin 4.6 3.5 - 5.7 g/dL    Protein Total 7.2 6.4 - 8.9 g/dL    Alkaline Phosphatase 57 34 - 104 U/L    ALT 13 7 - 52 U/L    AST 23 13 - 39 U/L   Lipid Profile   Result Value Ref Range    Cholesterol 176 <200 mg/dL    Triglycerides 75 <150  mg/dL    HDL Cholesterol 59 23 - 92 mg/dL    LDL Cholesterol Calculated 102 (H) <100 mg/dL    Non HDL Cholesterol 117 <130 mg/dL   Thyrotropin GH   Result Value Ref Range    Thyrotropin 0.91 0.34 - 5.60 IU/mL      ASSESSMENT/Plan :    Radha was seen today for physical.    Diagnoses and all orders for this visit:    Visit for preventive health examination    Chronic allergic rhinitis due to pollen, unspecified seasonality  -     cetirizine-pseudoePHEDrine (ZYRTEC-D) 5-120 MG per 12 hr tablet; Take 1 tablet by mouth 2 times daily    Irritable bowel syndrome, unspecified type  -     linaclotide (LINZESS) 145 MCG capsule; Take 1 capsule (145 mcg) by mouth daily    Anxiety state  -     traZODone (DESYREL) 100 MG tablet; TAKE 3-4 TABLETS BY MOUTH AT BEDTIME.  -     venlafaxine (EFFEXOR-XR) 150 MG 24 hr capsule; Take 1 capsule (150 mg) by mouth daily with food    Recurrent major depression in partial remission (H)  -     traZODone (DESYREL) 100 MG tablet; TAKE 3-4 TABLETS BY MOUTH AT BEDTIME.    Depression, major, recurrent, in partial remission (H)  -     venlafaxine (EFFEXOR-XR) 150 MG 24 hr capsule; Take 1 capsule (150 mg) by mouth daily with food    Screening for deficiency anemia  -     CBC with platelets differential; Future  -     CBC with platelets differential    Screening for metabolic disorder  -     Comprehensive metabolic panel; Future  -     Comprehensive metabolic panel    Screening for hyperlipidemia  -     Lipid Profile; Future  -     Lipid Profile    Tobacco abuse    Visit for screening mammogram  -     MA Screening Digital Bilateral; Future    Screening for hypothyroidism  -     Thyrotropin GH; Future  -     Thyrotropin GH    Class 1 obesity without serious comorbidity with body mass index (BMI) of 31.0 to 31.9 in adult, unspecified obesity type  -     BARIATRIC ADULT REFERRAL    Chronic allergic rhinitis, unspecified seasonality, unspecified trigger  -     montelukast (SINGULAIR) 10 MG tablet; Take 1  tablet (10 mg) by mouth At Bedtime      Will notify of lab results when available. Discussed diet, exercise and healthy lifestyle changes. Continue current medications. Trial of Singulair for allergies along with Zyrtec-D and Flonase.    Referred to Dr. You for medical weight management.     Mammogram scheduled. Discussed smoking cessation and she is not interested at this time.     Rashad Adhikari MD

## 2018-08-29 NOTE — LETTER
August 29, 2018      Radha Ruiz  [address] [address]        Dear Radha,     Your labs all look fine. Your complete blood count was normal. Your comprehensive metabolic panel (a test that looks at liver and kidney function, blood sugar, electrolytes, and nutritional status) was normal. Your cholesterol is excellent.  Your thyroid is normal.    It was a pleasure seeing you the other day.  If you have any questions, please don't hesitate to call us.         Sincerely,        Rashad Adhikari MD                                      Results for orders placed or performed in visit on 08/29/18   CBC with platelets differential   Result Value Ref Range    WBC 6.6 4.0 - 11.0 10e9/L    RBC Count 3.97 3.8 - 5.2 10e12/L    Hemoglobin 13.2 11.7 - 15.7 g/dL    Hematocrit 39.0 35.0 - 47.0 %    MCV 98 78 - 100 fl    MCH 33.2 (H) 26.5 - 33.0 pg    MCHC 33.8 31.5 - 36.5 g/dL    RDW 12.2 10.0 - 15.0 %    Platelet Count 262 150 - 450 10e9/L    Diff Method Automated Method     % Neutrophils 63.0 %    % Lymphocytes 30.0 %    % Monocytes 5.2 %    % Eosinophils 0.9 %    % Basophils 0.6 %    % Immature Granulocytes 0.3 %    Absolute Neutrophil 4.1 1.6 - 8.3 10e9/L    Absolute Lymphocytes 2.0 0.8 - 5.3 10e9/L    Absolute Monocytes 0.3 0.0 - 1.3 10e9/L    Absolute Eosinophils 0.1 0.0 - 0.7 10e9/L    Absolute Basophils 0.0 0.0 - 0.2 10e9/L    Abs Immature Granulocytes 0.0 0 - 0.4 10e9/L   Comprehensive metabolic panel   Result Value Ref Range    Sodium 140 134 - 144 mmol/L    Potassium 4.5 3.5 - 5.1 mmol/L    Chloride 107 98 - 107 mmol/L    Carbon Dioxide 27 21 - 31 mmol/L    Anion Gap 6 3 - 14 mmol/L    Glucose 98 70 - 105 mg/dL    Urea Nitrogen 15 7 - 25 mg/dL    Creatinine 0.83 0.60 - 1.20 mg/dL    GFR Estimate 74 >60 mL/min/1.7m2    GFR Estimate If Black 90 >60 mL/min/1.7m2    Calcium 9.8 8.6 - 10.3 mg/dL    Bilirubin Total 0.3 0.3 - 1.0 mg/dL    Albumin 4.6 3.5 - 5.7 g/dL    Protein Total 7.2 6.4 - 8.9 g/dL     Alkaline Phosphatase 57 34 - 104 U/L    ALT 13 7 - 52 U/L    AST 23 13 - 39 U/L   Lipid Profile   Result Value Ref Range    Cholesterol 176 <200 mg/dL    Triglycerides 75 <150 mg/dL    HDL Cholesterol 59 23 - 92 mg/dL    LDL Cholesterol Calculated 102 (H) <100 mg/dL    Non HDL Cholesterol 117 <130 mg/dL   Thyrotropin GH   Result Value Ref Range    Thyrotropin 0.91 0.34 - 5.60 IU/mL

## 2018-08-30 ASSESSMENT — ANXIETY QUESTIONNAIRES: GAD7 TOTAL SCORE: 6

## 2018-08-30 ASSESSMENT — PATIENT HEALTH QUESTIONNAIRE - PHQ9: SUM OF ALL RESPONSES TO PHQ QUESTIONS 1-9: 4

## 2019-06-24 ENCOUNTER — OFFICE VISIT (OUTPATIENT)
Dept: FAMILY MEDICINE | Facility: OTHER | Age: 44
End: 2019-06-24
Attending: FAMILY MEDICINE
Payer: COMMERCIAL

## 2019-06-24 VITALS
HEART RATE: 84 BPM | TEMPERATURE: 97.4 F | RESPIRATION RATE: 16 BRPM | HEIGHT: 61 IN | BODY MASS INDEX: 29.3 KG/M2 | SYSTOLIC BLOOD PRESSURE: 119 MMHG | DIASTOLIC BLOOD PRESSURE: 74 MMHG | WEIGHT: 155.2 LBS

## 2019-06-24 DIAGNOSIS — E89.40 SURGICAL MENOPAUSE: ICD-10-CM

## 2019-06-24 DIAGNOSIS — F33.41 DEPRESSION, MAJOR, RECURRENT, IN PARTIAL REMISSION (H): ICD-10-CM

## 2019-06-24 DIAGNOSIS — G25.81 RESTLESS LEG SYNDROME: ICD-10-CM

## 2019-06-24 DIAGNOSIS — F41.1 ANXIETY STATE: ICD-10-CM

## 2019-06-24 DIAGNOSIS — J30.1 CHRONIC ALLERGIC RHINITIS DUE TO POLLEN: ICD-10-CM

## 2019-06-24 DIAGNOSIS — K58.9 IRRITABLE BOWEL SYNDROME, UNSPECIFIED TYPE: ICD-10-CM

## 2019-06-24 DIAGNOSIS — Z72.0 TOBACCO ABUSE: ICD-10-CM

## 2019-06-24 DIAGNOSIS — F33.41 RECURRENT MAJOR DEPRESSION IN PARTIAL REMISSION (H): ICD-10-CM

## 2019-06-24 DIAGNOSIS — J30.1 SEASONAL ALLERGIC RHINITIS DUE TO POLLEN: ICD-10-CM

## 2019-06-24 DIAGNOSIS — Z00.00 VISIT FOR PREVENTIVE HEALTH EXAMINATION: Primary | ICD-10-CM

## 2019-06-24 DIAGNOSIS — Z80.0 FH: COLON CANCER: ICD-10-CM

## 2019-06-24 DIAGNOSIS — Z12.11 ENCOUNTER FOR SCREENING COLONOSCOPY: ICD-10-CM

## 2019-06-24 DIAGNOSIS — Z12.31 VISIT FOR SCREENING MAMMOGRAM: ICD-10-CM

## 2019-06-24 PROCEDURE — 99396 PREV VISIT EST AGE 40-64: CPT | Performed by: FAMILY MEDICINE

## 2019-06-24 RX ORDER — CETIRIZINE HYDROCHLORIDE, PSEUDOEPHEDRINE HYDROCHLORIDE 5; 120 MG/1; MG/1
1 TABLET, FILM COATED, EXTENDED RELEASE ORAL 2 TIMES DAILY
Qty: 180 TABLET | Refills: 5 | Status: SHIPPED | OUTPATIENT
Start: 2019-06-24 | End: 2020-03-20

## 2019-06-24 RX ORDER — TRAZODONE HYDROCHLORIDE 100 MG/1
TABLET ORAL
Qty: 360 TABLET | Refills: 3 | Status: SHIPPED | OUTPATIENT
Start: 2019-06-24 | End: 2020-06-17

## 2019-06-24 RX ORDER — FLUTICASONE PROPIONATE 50 MCG
SPRAY, SUSPENSION (ML) NASAL
Qty: 18.2 ML | Refills: 11 | Status: SHIPPED | OUTPATIENT
Start: 2019-06-24 | End: 2020-09-14

## 2019-06-24 RX ORDER — VENLAFAXINE HYDROCHLORIDE 150 MG/1
150 CAPSULE, EXTENDED RELEASE ORAL
Qty: 90 CAPSULE | Refills: 3 | Status: SHIPPED | OUTPATIENT
Start: 2019-06-24 | End: 2020-05-22

## 2019-06-24 ASSESSMENT — ANXIETY QUESTIONNAIRES
5. BEING SO RESTLESS THAT IT IS HARD TO SIT STILL: NOT AT ALL
7. FEELING AFRAID AS IF SOMETHING AWFUL MIGHT HAPPEN: NOT AT ALL
IF YOU CHECKED OFF ANY PROBLEMS ON THIS QUESTIONNAIRE, HOW DIFFICULT HAVE THESE PROBLEMS MADE IT FOR YOU TO DO YOUR WORK, TAKE CARE OF THINGS AT HOME, OR GET ALONG WITH OTHER PEOPLE: NOT DIFFICULT AT ALL
GAD7 TOTAL SCORE: 1
3. WORRYING TOO MUCH ABOUT DIFFERENT THINGS: NOT AT ALL
2. NOT BEING ABLE TO STOP OR CONTROL WORRYING: NOT AT ALL
1. FEELING NERVOUS, ANXIOUS, OR ON EDGE: SEVERAL DAYS
6. BECOMING EASILY ANNOYED OR IRRITABLE: NOT AT ALL

## 2019-06-24 ASSESSMENT — MIFFLIN-ST. JEOR: SCORE: 1280.48

## 2019-06-24 ASSESSMENT — PATIENT HEALTH QUESTIONNAIRE - PHQ9
5. POOR APPETITE OR OVEREATING: NOT AT ALL
SUM OF ALL RESPONSES TO PHQ QUESTIONS 1-9: 2

## 2019-06-24 ASSESSMENT — PAIN SCALES - GENERAL: PAINLEVEL: MODERATE PAIN (4)

## 2019-06-24 NOTE — PROGRESS NOTES
"Nursing Notes:   Yasmin Burr LPN  6/24/2019 10:07 AM  Signed  Patient presents to clinic for physical.   Chief Complaint   Patient presents with     Physical       Initial /74 (BP Location: Left arm, Patient Position: Sitting, Cuff Size: Adult Regular)   Pulse 84   Temp 97.4  F (36.3  C) (Tympanic)   Resp 16   Ht 1.54 m (5' 0.63\")   Wt 70.4 kg (155 lb 3.2 oz)   Breastfeeding? No   BMI 29.68 kg/m    Estimated body mass index is 29.68 kg/m  as calculated from the following:    Height as of this encounter: 1.54 m (5' 0.63\").    Weight as of this encounter: 70.4 kg (155 lb 3.2 oz).  Medication Reconciliation: complete    Yasmin Burr LPN      SUBJECTIVE:  Radha Ruiz  is a 43 year old female who comes in today for complete evaluation.  I last saw her about a year ago.  She underwent vaginal vault prolapse and rectocele repair in the fall 2017.  She is due for colonoscopy this year because of family history of colon cancer.  She is up-to-date on immunizations.    She continues on Effexor XR and trazodone.  She has irritable bowel syndrome for which she was taking Linzess.    Her lipids were good last year.  She is due for mammogram. She is still smoking.     Her sister's son was diagnosed with muscular dystrophy at age 10.  He is still walking.  There is no family history. It is genetic.  He has Duchenne's.     Past Medical, Family, and Social History reviewed and updated as noted below.   ROS is negative except as noted above       Allergies   Allergen Reactions     Sulfa Drugs Hives   ,   Family History   Problem Relation Age of Onset     Colon Cancer Father 38        Cancer-colon,Colon cancer     Other - See Comments Mother         Psychiatric illness,Untreated anxiety     Other - See Comments Mother         D&C for postmenopausal bleeding benign     Cancer Maternal Grandfather         Cancer,Brain     Heart Disease Maternal Grandfather         Heart Disease,MI     Hypertension Maternal Grandfather "         Hypertension     Other - See Comments Maternal Uncle         Hemochromatosis     Hypertension Maternal Grandmother         Hypertension     Other - See Comments Maternal Grandmother         Alzheimer's     Diabetes Other         Diabetes,Diabetes     Heart Disease Paternal Grandmother         Heart Disease,CHF     Cancer Maternal Uncle         Cancer,cancer all over.     Cancer Paternal Uncle         Cancer,Lung cancer     Breast Cancer No family hx of         Cancer-breast   ,   Current Outpatient Medications   Medication     cetirizine-pseudoePHEDrine ER (ZYRTEC-D) 5-120 MG 12 hr tablet     DOCQLACE 100 MG capsule     fluticasone (FLONASE) 50 MCG/ACT nasal spray     ibuprofen (ADVIL/MOTRIN) 200 MG tablet     linaclotide (LINZESS) 72 MCG capsule     montelukast (SINGULAIR) 10 MG tablet     traZODone (DESYREL) 100 MG tablet     venlafaxine (EFFEXOR-XR) 150 MG 24 hr capsule     No current facility-administered medications for this visit.    ,   Past Medical History:   Diagnosis Date     Encounter for gynecological examination without abnormal finding     08,Satisfactory GYN examination     Other pulmonary embolism without acute cor pulmonale (H)     History of pulmonary emboli after      Personal history of other medical treatment (CODE)      2, para 1-0-1-1     Personal history of other medical treatment (CODE)     10/00,History of blood transfusion with    ,   Patient Active Problem List    Diagnosis Date Noted     Tobacco abuse 2018     Priority: Medium     Chronic allergic rhinitis due to pollen, unspecified seasonality 2018     Priority: Medium     Anxiety state 2018     Priority: Medium     Constipation 2018     Priority: Medium     Migraine headache 2018     Priority: Medium     Restless leg syndrome 2018     Priority: Medium     Surgical menopause 2015     Priority: Medium     H/O adenomatous polyp of colon 10/06/2014      "Priority: Medium     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     ,   Past Surgical History:   Procedure Laterality Date     ant/post colporr w enterocele incl cysturethroscopy  2017    Dr. Mc      SECTION      10/00 /06,History of blood transfusion with      COLONOSCOPY      ,,,F/U      Cryotherapy of Cervix       LAPAROSCOPIC ASSISTED HYSTERECTOMY VAGINAL, BILATERAL SALPINGO-OOPHORECTOMY, COMBINED      Ney Hart MD [facility]     LAPAROSCOPIC TUBAL LIGATION          and   Social History     Tobacco Use     Smoking status: Current Every Day Smoker     Packs/day: 0.13     Types: Cigarettes     Smokeless tobacco: Never Used   Substance Use Topics     Alcohol use: Yes     Comment: Alcoholic Drinks/day: very rare     OBJECTIVE:  /74 (BP Location: Left arm, Patient Position: Sitting, Cuff Size: Adult Regular)   Pulse 84   Temp 97.4  F (36.3  C) (Tympanic)   Resp 16   Ht 1.54 m (5' 0.63\")   Wt 70.4 kg (155 lb 3.2 oz)   Breastfeeding? No   BMI 29.68 kg/m     EXAM:  General Appearance: Pleasant, alert, appropriate appearance for age. No acute distress  Head Exam: Normal. Normocephalic, atraumatic.  Eye Exam: PERRLA, EOMI, conjunctivae, sclerae normal.  Ear Exam: Normal TM's bilaterally. Normal auditory canals and external ears. Non-tender.  Nose Exam: Normal external nose, mucus membranes, and septum.  OroPharynx Exam:  Dental hygiene adequate. Normal buccal mucosa. Normal pharynx.  Neck Exam:  Supple, no masses or nodes. No bruits  Thyroid Exam: No nodules or enlargement.  Chest/Respiratory Exam: Normal chest wall and respirations. Clear to auscultation.  Breast Exam: No dimpling, nipple retraction or " discharge. No masses or nodes.  Cardiovascular Exam: Regular rate and rhythm. S1, S2, no murmur, click, gallop, or rubs.  Gastrointestinal Exam: Soft, non-tender, no masses or organomegaly.  Lymphatic Exam: Non-palpable nodes in neck,clavicular, axillary, or inguinal regions.  Musculoskeletal Exam: Back is straight and non-tender, full ROM of upper and lower extremities.  Foot Exam: Left and right foot: good pedal pulses  Skin: no rash or abnormalities  Neurologic Exam:  normal gross motor, tone coordination and no tremor.  Psychiatric Exam: Alert and oriented - appropriate affect.     Results for orders placed or performed in visit on 08/29/18   CBC with platelets differential   Result Value Ref Range    WBC 6.6 4.0 - 11.0 10e9/L    RBC Count 3.97 3.8 - 5.2 10e12/L    Hemoglobin 13.2 11.7 - 15.7 g/dL    Hematocrit 39.0 35.0 - 47.0 %    MCV 98 78 - 100 fl    MCH 33.2 (H) 26.5 - 33.0 pg    MCHC 33.8 31.5 - 36.5 g/dL    RDW 12.2 10.0 - 15.0 %    Platelet Count 262 150 - 450 10e9/L    Diff Method Automated Method     % Neutrophils 63.0 %    % Lymphocytes 30.0 %    % Monocytes 5.2 %    % Eosinophils 0.9 %    % Basophils 0.6 %    % Immature Granulocytes 0.3 %    Absolute Neutrophil 4.1 1.6 - 8.3 10e9/L    Absolute Lymphocytes 2.0 0.8 - 5.3 10e9/L    Absolute Monocytes 0.3 0.0 - 1.3 10e9/L    Absolute Eosinophils 0.1 0.0 - 0.7 10e9/L    Absolute Basophils 0.0 0.0 - 0.2 10e9/L    Abs Immature Granulocytes 0.0 0 - 0.4 10e9/L   Comprehensive metabolic panel   Result Value Ref Range    Sodium 140 134 - 144 mmol/L    Potassium 4.5 3.5 - 5.1 mmol/L    Chloride 107 98 - 107 mmol/L    Carbon Dioxide 27 21 - 31 mmol/L    Anion Gap 6 3 - 14 mmol/L    Glucose 98 70 - 105 mg/dL    Urea Nitrogen 15 7 - 25 mg/dL    Creatinine 0.83 0.60 - 1.20 mg/dL    GFR Estimate 74 >60 mL/min/1.7m2    GFR Estimate If Black 90 >60 mL/min/1.7m2    Calcium 9.8 8.6 - 10.3 mg/dL    Bilirubin Total 0.3 0.3 - 1.0 mg/dL    Albumin 4.6 3.5 - 5.7 g/dL     Protein Total 7.2 6.4 - 8.9 g/dL    Alkaline Phosphatase 57 34 - 104 U/L    ALT 13 7 - 52 U/L    AST 23 13 - 39 U/L   Lipid Profile   Result Value Ref Range    Cholesterol 176 <200 mg/dL    Triglycerides 75 <150 mg/dL    HDL Cholesterol 59 23 - 92 mg/dL    LDL Cholesterol Calculated 102 (H) <100 mg/dL    Non HDL Cholesterol 117 <130 mg/dL   Thyrotropin GH   Result Value Ref Range    Thyrotropin 0.91 0.34 - 5.60 IU/mL      ASSESSMENT/Plan :    Radha was seen today for physical.    Diagnoses and all orders for this visit:    Visit for preventive health examination    Surgical menopause    FH: colon cancer  -     GASTROENTEROLOGY ADULT REF PROCEDURE ONLY Other; ([facility])    Depression, major, recurrent, in partial remission (H)  -     venlafaxine (EFFEXOR-XR) 150 MG 24 hr capsule; Take 1 capsule (150 mg) by mouth daily with food    Restless leg syndrome    Anxiety state  -     traZODone (DESYREL) 100 MG tablet; TAKE 3-4 TABLETS BY MOUTH AT BEDTIME.  -     venlafaxine (EFFEXOR-XR) 150 MG 24 hr capsule; Take 1 capsule (150 mg) by mouth daily with food    Tobacco abuse    Seasonal allergic rhinitis due to pollen  -     fluticasone (FLONASE) 50 MCG/ACT nasal spray; INHALE 2 SPRAYS INTO BOTH NOSTRILS ONCE DAILY.    Chronic allergic rhinitis due to pollen  -     cetirizine-pseudoePHEDrine ER (ZYRTEC-D) 5-120 MG 12 hr tablet; Take 1 tablet by mouth 2 times daily    Recurrent major depression in partial remission (H)  -     traZODone (DESYREL) 100 MG tablet; TAKE 3-4 TABLETS BY MOUTH AT BEDTIME.    Irritable bowel syndrome, unspecified type  -     linaclotide (LINZESS) 72 MCG capsule; Take 1 capsule (72 mcg) by mouth daily    Visit for screening mammogram  -     Cancel: MA Screening Digital Bilateral; Future    Encounter for screening colonoscopy  -     GASTROENTEROLOGY ADULT REF PROCEDURE ONLY Other; ([facility])      Will notify of lab results when available. Discussed diet, exercise and healthy lifestyle changes. Continue current  medications, but decrease Linzess to 72 mcg daily. Mammogram scheduled.     Discussed smoking cessation.    She will be low risk for colonoscopy.    Rashad Adhikari MD

## 2019-06-24 NOTE — NURSING NOTE
"Patient presents to clinic for physical.   Chief Complaint   Patient presents with     Physical       Initial /74 (BP Location: Left arm, Patient Position: Sitting, Cuff Size: Adult Regular)   Pulse 84   Temp 97.4  F (36.3  C) (Tympanic)   Resp 16   Ht 1.54 m (5' 0.63\")   Wt 70.4 kg (155 lb 3.2 oz)   Breastfeeding? No   BMI 29.68 kg/m   Estimated body mass index is 29.68 kg/m  as calculated from the following:    Height as of this encounter: 1.54 m (5' 0.63\").    Weight as of this encounter: 70.4 kg (155 lb 3.2 oz).  Medication Reconciliation: complete    Yasmin Burr LPN    "

## 2019-06-25 ENCOUNTER — TELEPHONE (OUTPATIENT)
Dept: SURGERY | Facility: OTHER | Age: 44
End: 2019-06-25

## 2019-06-25 ASSESSMENT — ANXIETY QUESTIONNAIRES: GAD7 TOTAL SCORE: 1

## 2019-06-25 NOTE — TELEPHONE ENCOUNTER
Patient referred by Dr. Adhikari for a screening  colonoscopy .  She is 43 with a family history of colon cancer.  Please advise.   Thank you.   Carmencita Baig on 6/25/2019 at 1:38 PM

## 2019-06-26 ENCOUNTER — HOSPITAL ENCOUNTER (OUTPATIENT)
Facility: OTHER | Age: 44
End: 2019-06-26
Attending: SURGERY | Admitting: SURGERY
Payer: COMMERCIAL

## 2019-06-26 DIAGNOSIS — Z80.0 FAMILY HISTORY OF COLON CANCER: Primary | ICD-10-CM

## 2019-06-26 NOTE — TELEPHONE ENCOUNTER
Screening Questions for the Scheduling of Screening Colonoscopies   (If Colonoscopy is diagnostic, Provider should review the chart before scheduling.)  Are you younger than 50 or older than 80?  YES   Do you take aspirin or fish oil?  NO  (if yes, tell patient to stop 1 week prior to Colonoscopy)  Do you take warfarin (Coumadin), clopidogrel (Plavix), apixaban (Eliquis), dabigatram (Pradaxa), rivaroxaban (Xarelto) or any blood thinner? NO   Do you use oxygen at home?  NO   Do you have kidney disease? NO   Are you on dialysis? NO   Have you had a stroke or heart attack in the last year? NO   Have you had a stent in your heart or any blood vessel in the last year? NO   Have you had a transplant of any organ? NO   Have you had a colonoscopy or upper endoscopy (EGD) before? YES          When?  2014  Date of scheduled Colonoscopy.   10[facility]09/2019  [facility]

## 2019-06-28 RX ORDER — BISACODYL 5 MG
TABLET, DELAYED RELEASE (ENTERIC COATED) ORAL
Qty: 2 TABLET | Refills: 0 | Status: SHIPPED | OUTPATIENT
Start: 2019-06-28 | End: 2020-08-03

## 2019-06-28 RX ORDER — POLYETHYLENE GLYCOL 3350, SODIUM CHLORIDE, SODIUM BICARBONATE, POTASSIUM CHLORIDE 420; 11.2; 5.72; 1.48 G/4L; G/4L; G/4L; G/4L
4000 POWDER, FOR SOLUTION ORAL ONCE
Qty: 4000 ML | Refills: 0 | Status: SHIPPED | OUTPATIENT
Start: 2019-06-28 | End: 2019-06-28

## 2019-08-31 DIAGNOSIS — K58.9 IRRITABLE BOWEL SYNDROME, UNSPECIFIED TYPE: ICD-10-CM

## 2019-09-06 RX ORDER — LINACLOTIDE 145 UG/1
CAPSULE, GELATIN COATED ORAL
Qty: 90 CAPSULE | Refills: 3 | OUTPATIENT
Start: 2019-09-06

## 2019-09-17 DIAGNOSIS — K58.9 IRRITABLE BOWEL SYNDROME, UNSPECIFIED TYPE: ICD-10-CM

## 2019-09-20 NOTE — TELEPHONE ENCOUNTER
linaclotide (LINZESS) 72 MCG capsule  Take 1 capsule (72 mcg) by mouth daily        Last Written Prescription Date:  6/24/2019    Last Fill Quantity: 90,   # refills: 11    Last Office Visit: 6/24/2019    Future Office visit: No future appointment scheduled at this time.       Refused refill as there are refills available at pharmacy. Verified with Don at [facility] at [facility].    Requested Prescriptions   Pending Prescriptions Disp Refills     linaclotide (LINZESS) 72 MCG capsule 90 capsule 3     Sig: Take 1 capsule (72 mcg) by mouth daily       There is no refill protocol information for this order        Unable to complete prescription refill per RN Medication Refill Policy.................... Kathy Roblero RN ....................  9/20/2019   3:04 PM

## 2019-10-04 DIAGNOSIS — J30.9 CHRONIC ALLERGIC RHINITIS: ICD-10-CM

## 2019-10-04 RX ORDER — MONTELUKAST SODIUM 10 MG/1
TABLET ORAL
Qty: 90 TABLET | Refills: 2 | Status: SHIPPED | OUTPATIENT
Start: 2019-10-04 | End: 2020-08-03

## 2019-10-04 NOTE — TELEPHONE ENCOUNTER
"Requested Prescriptions   Pending Prescriptions Disp Refills     montelukast (SINGULAIR) 10 MG tablet [Pharmacy Med Name: MONTELUKAST SOD 10 MG TABLET] 90 tablet 3     Sig: TAKE 1 TABLET BY MOUTH EVERYDAY AT BEDTIME       Leukotriene Inhibitors Protocol Passed - 10/4/2019  1:30 AM        Passed - Patient is age 12 or older     If patient is under 16, ok to refill using age based dosing.           Passed - Recent (12 mo) or future (30 days) visit within the authorizing provider's specialty     Patient has had an office visit with the authorizing provider or a provider within the authorizing providers department within the previous 12 mos or has a future within next 30 days. See \"Patient Info\" tab in inbasket, or \"Choose Columns\" in Meds & Orders section of the refill encounter.              Passed - Medication is active on med list        LOV 6/24/19  Prescription approved per [facility] Refill Protocol.  Brenda J. Goodell, RN on 10/4/2019 at 11:29 AM    "

## 2020-01-30 DIAGNOSIS — J30.1 CHRONIC ALLERGIC RHINITIS DUE TO POLLEN: ICD-10-CM

## 2020-01-30 RX ORDER — CETIRIZINE HCL 10 MG
TABLET ORAL
Qty: 180 TABLET | Refills: 5 | OUTPATIENT
Start: 2020-01-30

## 2020-01-30 NOTE — TELEPHONE ENCOUNTER
[facility] sent Rx request for the following:      [facility] CETIRIZINE-D TABLET  Sig: TAKE ONE TABLET BY MOUTH TWO TIMES DAILY     Last Prescription Date:   6/24/2019  Last Fill Qty/Refills:         180, R-5    Last Office Visit:              6/24/2019   Future Office visit:           none      Call to [facility] pharmacy to verify that they still have an active prescription from June. Spoke with Melissa and she stated that the prescription they have is still active until June 2020. Informed her that this request would be refused. Mak Colmenares RN, BSN  ....................  1/30/2020   2:56 PM

## 2020-03-20 DIAGNOSIS — J30.1 CHRONIC ALLERGIC RHINITIS DUE TO POLLEN: ICD-10-CM

## 2020-03-20 RX ORDER — CETIRIZINE HCL 10 MG
TABLET ORAL
Qty: 180 TABLET | Refills: 5 | Status: SHIPPED | OUTPATIENT
Start: 2020-03-20 | End: 2021-04-07

## 2020-03-20 NOTE — TELEPHONE ENCOUNTER
Requested Prescriptions   Pending Prescriptions Disp Refills     [facility] ALLERGY RELIEF-D 5-120 MG 12 hr tablet [Pharmacy Med Name: [facility] CETIRIZINE-D TABLET] 180 tablet 5     Sig: TAKE ONE TABLET BY MOUTH TWO TIMES DAILY       There is no refill protocol information for this order          Last Written Prescription Date:  06/24/2019  Last Fill Quantity: 180,   # refills: 5  Last Office Visit: 06/24/2019 with Rashad Adhikari MD  Future Office visit:   None noted.  Unable to complete prescription refill per RN medication refill policy. Will route to provider for review and consideration.  Velvet Lewis RN on 3/20/2020 at 10:51 AM

## 2020-06-15 DIAGNOSIS — F33.41 DEPRESSION, MAJOR, RECURRENT, IN PARTIAL REMISSION (H): ICD-10-CM

## 2020-06-15 DIAGNOSIS — F33.41 RECURRENT MAJOR DEPRESSION IN PARTIAL REMISSION (H): ICD-10-CM

## 2020-06-15 DIAGNOSIS — F41.1 ANXIETY STATE: ICD-10-CM

## 2020-06-17 RX ORDER — TRAZODONE HYDROCHLORIDE 100 MG/1
TABLET ORAL
Qty: 360 TABLET | Refills: 3 | Status: SHIPPED | OUTPATIENT
Start: 2020-06-17 | End: 2021-07-21

## 2020-06-17 RX ORDER — VENLAFAXINE HYDROCHLORIDE 150 MG/1
CAPSULE, EXTENDED RELEASE ORAL
Qty: 90 CAPSULE | Refills: 11 | Status: SHIPPED | OUTPATIENT
Start: 2020-06-17 | End: 2021-07-21

## 2020-06-17 NOTE — TELEPHONE ENCOUNTER
" Disp  Refills  Start  End  [facility]    venlafaxine (EFFEXOR-XR) 150 MG 24 hr capsule  30 capsule  0  5/22/2020   No    Sig: TAKE 1 CAPSULE BY MOUTH EVERY DAY WITH FOOD        LOV: 6/24/2019  Future Office visit: No future appointment scheduled at this time. Reminder letter has been sent last month.     Routing refill request to provider for review/approval because:  Failed protocol    Requested Prescriptions   Pending Prescriptions Disp Refills     venlafaxine (EFFEXOR-XR) 150 MG 24 hr capsule [Pharmacy Med Name: VENLAFAXINE HCL  MG CAP] 30 capsule 0     Sig: TAKE 1 CAPSULE BY MOUTH EVERY DAY WITH FOOD       Serotonin-Norepinephrine Reuptake Inhibitors  Failed - 6/15/2020 12:36 PM        Failed - PHQ-9 score of less than 5 in past 6 months     Please review last PHQ-9 score.           Failed - Normal serum creatinine on file in past 12 months     Recent Labs   Lab Test 08/29/18  1050   CR 0.83       Ok to refill medication if creatinine is low          Failed - Recent (6 mo) or future (30 days) visit within the authorizing provider's specialty     Patient had office visit in the last 6 months or has a visit in the next 30 days with authorizing provider or within the authorizing provider's specialty.  See \"Patient Info\" tab in inbasket, or \"Choose Columns\" in Meds & Orders section of the refill encounter.            Passed - Blood pressure under 140/90 in past 12 months     BP Readings from Last 3 Encounters:   06/24/19 119/74   08/29/18 112/80   12/19/17 112/78                 Passed - Medication is active on med list        Passed - Patient is age 18 or older        Passed - No active pregnancy on record        Passed - No positive pregnancy test in past 12 months         Unable to complete prescription refill per RN Medication Refill Policy.................... Kathy Roblero RN ....................  6/17/2020   3:35 PM        "

## 2020-08-03 ENCOUNTER — OFFICE VISIT (OUTPATIENT)
Dept: FAMILY MEDICINE | Facility: OTHER | Age: 45
End: 2020-08-03
Attending: FAMILY MEDICINE
Payer: COMMERCIAL

## 2020-08-03 VITALS
BODY MASS INDEX: 30.25 KG/M2 | WEIGHT: 160.2 LBS | HEART RATE: 90 BPM | RESPIRATION RATE: 15 BRPM | OXYGEN SATURATION: 97 % | TEMPERATURE: 97.4 F | SYSTOLIC BLOOD PRESSURE: 102 MMHG | DIASTOLIC BLOOD PRESSURE: 70 MMHG | HEIGHT: 61 IN

## 2020-08-03 DIAGNOSIS — Z13.228 SCREENING FOR METABOLIC DISORDER: ICD-10-CM

## 2020-08-03 DIAGNOSIS — K58.9 IRRITABLE BOWEL SYNDROME, UNSPECIFIED TYPE: ICD-10-CM

## 2020-08-03 DIAGNOSIS — Z00.00 VISIT FOR PREVENTIVE HEALTH EXAMINATION: Primary | ICD-10-CM

## 2020-08-03 DIAGNOSIS — Z12.31 VISIT FOR SCREENING MAMMOGRAM: ICD-10-CM

## 2020-08-03 DIAGNOSIS — F33.41 DEPRESSION, MAJOR, RECURRENT, IN PARTIAL REMISSION (H): ICD-10-CM

## 2020-08-03 DIAGNOSIS — F41.1 ANXIETY STATE: ICD-10-CM

## 2020-08-03 DIAGNOSIS — Z13.220 SCREENING FOR HYPERLIPIDEMIA: ICD-10-CM

## 2020-08-03 DIAGNOSIS — Z86.0101 H/O ADENOMATOUS POLYP OF COLON: ICD-10-CM

## 2020-08-03 DIAGNOSIS — Z80.0 FH: COLON CANCER: ICD-10-CM

## 2020-08-03 DIAGNOSIS — Z13.0 SCREENING FOR DEFICIENCY ANEMIA: ICD-10-CM

## 2020-08-03 DIAGNOSIS — Z72.0 TOBACCO ABUSE: ICD-10-CM

## 2020-08-03 DIAGNOSIS — Z13.29 SCREENING FOR HYPOTHYROIDISM: ICD-10-CM

## 2020-08-03 LAB
ALBUMIN SERPL-MCNC: 5 G/DL (ref 3.5–5.7)
ALP SERPL-CCNC: 51 U/L (ref 34–104)
ALT SERPL W P-5'-P-CCNC: 25 U/L (ref 7–52)
ANION GAP SERPL CALCULATED.3IONS-SCNC: 8 MMOL/L (ref 3–14)
AST SERPL W P-5'-P-CCNC: 30 U/L (ref 13–39)
BASOPHILS # BLD AUTO: 0.1 10E9/L (ref 0–0.2)
BASOPHILS NFR BLD AUTO: 0.7 %
BILIRUB SERPL-MCNC: 0.3 MG/DL (ref 0.3–1)
BUN SERPL-MCNC: 6 MG/DL (ref 7–25)
CALCIUM SERPL-MCNC: 9.8 MG/DL (ref 8.6–10.3)
CHLORIDE SERPL-SCNC: 103 MMOL/L (ref 98–107)
CHOLEST SERPL-MCNC: 186 MG/DL
CO2 SERPL-SCNC: 28 MMOL/L (ref 21–31)
CREAT SERPL-MCNC: 0.94 MG/DL (ref 0.6–1.2)
DIFFERENTIAL METHOD BLD: NORMAL
EOSINOPHIL # BLD AUTO: 0.1 10E9/L (ref 0–0.7)
EOSINOPHIL NFR BLD AUTO: 1.5 %
ERYTHROCYTE [DISTWIDTH] IN BLOOD BY AUTOMATED COUNT: 12.1 % (ref 10–15)
GFR SERPL CREATININE-BSD FRML MDRD: 64 ML/MIN/{1.73_M2}
GLUCOSE SERPL-MCNC: 96 MG/DL (ref 70–105)
HCT VFR BLD AUTO: 40.9 % (ref 35–47)
HDLC SERPL-MCNC: 64 MG/DL (ref 23–92)
HGB BLD-MCNC: 13.8 G/DL (ref 11.7–15.7)
IMM GRANULOCYTES # BLD: 0 10E9/L (ref 0–0.4)
IMM GRANULOCYTES NFR BLD: 0.3 %
LDLC SERPL CALC-MCNC: 106 MG/DL
LYMPHOCYTES # BLD AUTO: 2.5 10E9/L (ref 0.8–5.3)
LYMPHOCYTES NFR BLD AUTO: 37.3 %
MCH RBC QN AUTO: 32.9 PG (ref 26.5–33)
MCHC RBC AUTO-ENTMCNC: 33.7 G/DL (ref 31.5–36.5)
MCV RBC AUTO: 97 FL (ref 78–100)
MONOCYTES # BLD AUTO: 0.4 10E9/L (ref 0–1.3)
MONOCYTES NFR BLD AUTO: 5.7 %
NEUTROPHILS # BLD AUTO: 3.7 10E9/L (ref 1.6–8.3)
NEUTROPHILS NFR BLD AUTO: 54.5 %
NONHDLC SERPL-MCNC: 122 MG/DL
PLATELET # BLD AUTO: 277 10E9/L (ref 150–450)
POTASSIUM SERPL-SCNC: 4.1 MMOL/L (ref 3.5–5.1)
PROT SERPL-MCNC: 7.8 G/DL (ref 6.4–8.9)
RBC # BLD AUTO: 4.2 10E12/L (ref 3.8–5.2)
SODIUM SERPL-SCNC: 139 MMOL/L (ref 134–144)
TRIGL SERPL-MCNC: 82 MG/DL
TSH SERPL DL<=0.05 MIU/L-ACNC: 1.47 IU/ML (ref 0.34–5.6)
WBC # BLD AUTO: 6.8 10E9/L (ref 4–11)

## 2020-08-03 PROCEDURE — 85025 COMPLETE CBC W/AUTO DIFF WBC: CPT | Mod: ZL | Performed by: FAMILY MEDICINE

## 2020-08-03 PROCEDURE — 84443 ASSAY THYROID STIM HORMONE: CPT | Mod: ZL | Performed by: FAMILY MEDICINE

## 2020-08-03 PROCEDURE — 80053 COMPREHEN METABOLIC PANEL: CPT | Mod: ZL | Performed by: FAMILY MEDICINE

## 2020-08-03 PROCEDURE — 99396 PREV VISIT EST AGE 40-64: CPT | Performed by: FAMILY MEDICINE

## 2020-08-03 PROCEDURE — 36415 COLL VENOUS BLD VENIPUNCTURE: CPT | Mod: ZL | Performed by: FAMILY MEDICINE

## 2020-08-03 PROCEDURE — 80061 LIPID PANEL: CPT | Mod: ZL | Performed by: FAMILY MEDICINE

## 2020-08-03 RX ORDER — VENLAFAXINE HYDROCHLORIDE 75 MG/1
75 CAPSULE, EXTENDED RELEASE ORAL DAILY
Qty: 90 CAPSULE | Refills: 11 | Status: SHIPPED | OUTPATIENT
Start: 2020-08-03 | End: 2021-09-23

## 2020-08-03 SDOH — HEALTH STABILITY: MENTAL HEALTH: HOW OFTEN DO YOU HAVE 6 OR MORE DRINKS ON ONE OCCASION?: NEVER

## 2020-08-03 SDOH — HEALTH STABILITY: MENTAL HEALTH: HOW MANY STANDARD DRINKS CONTAINING ALCOHOL DO YOU HAVE ON A TYPICAL DAY?: 1 OR 2

## 2020-08-03 SDOH — HEALTH STABILITY: MENTAL HEALTH: HOW OFTEN DO YOU HAVE A DRINK CONTAINING ALCOHOL?: 4 OR MORE TIMES A WEEK

## 2020-08-03 ASSESSMENT — ANXIETY QUESTIONNAIRES
3. WORRYING TOO MUCH ABOUT DIFFERENT THINGS: SEVERAL DAYS
5. BEING SO RESTLESS THAT IT IS HARD TO SIT STILL: SEVERAL DAYS
7. FEELING AFRAID AS IF SOMETHING AWFUL MIGHT HAPPEN: NEARLY EVERY DAY
6. BECOMING EASILY ANNOYED OR IRRITABLE: MORE THAN HALF THE DAYS
2. NOT BEING ABLE TO STOP OR CONTROL WORRYING: SEVERAL DAYS
GAD7 TOTAL SCORE: 14
IF YOU CHECKED OFF ANY PROBLEMS ON THIS QUESTIONNAIRE, HOW DIFFICULT HAVE THESE PROBLEMS MADE IT FOR YOU TO DO YOUR WORK, TAKE CARE OF THINGS AT HOME, OR GET ALONG WITH OTHER PEOPLE: SOMEWHAT DIFFICULT
1. FEELING NERVOUS, ANXIOUS, OR ON EDGE: NEARLY EVERY DAY

## 2020-08-03 ASSESSMENT — MIFFLIN-ST. JEOR: SCORE: 1301.29

## 2020-08-03 ASSESSMENT — PATIENT HEALTH QUESTIONNAIRE - PHQ9
5. POOR APPETITE OR OVEREATING: NEARLY EVERY DAY
SUM OF ALL RESPONSES TO PHQ QUESTIONS 1-9: 15

## 2020-08-03 ASSESSMENT — PAIN SCALES - GENERAL: PAINLEVEL: EXTREME PAIN (8)

## 2020-08-03 NOTE — LETTER
August 4, 2020      Radha Ruiz  [address] [address]        Dear Radha,     Your labs all look good. Your complete blood count was normal. Your comprehensive metabolic panel (a test that looks at liver and kidney function, blood sugar, electrolytes, and nutritional status) was normal. Your cholesterol is fine. Your thyroid is normal.     It was a pleasure seeing you the other day.  If you have any questions, please don't hesitate to call us.       Sincerely,        Rashad Adhikari MD                                        Results for orders placed or performed in visit on 08/03/20   Thyrotropin GH     Status: None   Result Value Ref Range    Thyrotropin 1.47 0.34 - 5.60 IU/mL   Lipid Profile     Status: Abnormal   Result Value Ref Range    Cholesterol 186 <200 mg/dL    Triglycerides 82 <150 mg/dL    HDL Cholesterol 64 23 - 92 mg/dL    LDL Cholesterol Calculated 106 (H) <100 mg/dL    Non HDL Cholesterol 122 <130 mg/dL   Comprehensive metabolic panel     Status: Abnormal   Result Value Ref Range    Sodium 139 134 - 144 mmol/L    Potassium 4.1 3.5 - 5.1 mmol/L    Chloride 103 98 - 107 mmol/L    Carbon Dioxide 28 21 - 31 mmol/L    Anion Gap 8 3 - 14 mmol/L    Glucose 96 70 - 105 mg/dL    Urea Nitrogen 6 (L) 7 - 25 mg/dL    Creatinine 0.94 0.60 - 1.20 mg/dL    GFR Estimate 64 >60 mL/min/[1.73_m2]    GFR Estimate If Black 78 >60 mL/min/[1.73_m2]    Calcium 9.8 8.6 - 10.3 mg/dL    Bilirubin Total 0.3 0.3 - 1.0 mg/dL    Albumin 5.0 3.5 - 5.7 g/dL    Protein Total 7.8 6.4 - 8.9 g/dL    Alkaline Phosphatase 51 34 - 104 U/L    ALT 25 7 - 52 U/L    AST 30 13 - 39 U/L   CBC with platelets differential     Status: None   Result Value Ref Range    WBC 6.8 4.0 - 11.0 10e9/L    RBC Count 4.20 3.8 - 5.2 10e12/L    Hemoglobin 13.8 11.7 - 15.7 g/dL    Hematocrit 40.9 35.0 - 47.0 %    MCV 97 78 - 100 fl    MCH 32.9 26.5 - 33.0 pg    MCHC 33.7 31.5 - 36.5 g/dL    RDW 12.1 10.0 - 15.0 %    Platelet Count 277 150 -  450 10e9/L    Diff Method Automated Method     % Neutrophils 54.5 %    % Lymphocytes 37.3 %    % Monocytes 5.7 %    % Eosinophils 1.5 %    % Basophils 0.7 %    % Immature Granulocytes 0.3 %    Absolute Neutrophil 3.7 1.6 - 8.3 10e9/L    Absolute Lymphocytes 2.5 0.8 - 5.3 10e9/L    Absolute Monocytes 0.4 0.0 - 1.3 10e9/L    Absolute Eosinophils 0.1 0.0 - 0.7 10e9/L    Absolute Basophils 0.1 0.0 - 0.2 10e9/L    Abs Immature Granulocytes 0.0 0 - 0.4 10e9/L

## 2020-08-03 NOTE — PROGRESS NOTES
"Nursing Notes:   Fiona Nicholas  8/3/2020  4:06 PM  Signed  Chief Complaint   Patient presents with     Physical       Initial /70 (BP Location: Right arm, Patient Position: Sitting, Cuff Size: Adult Regular)   Pulse 90   Temp 97.4  F (36.3  C) (Temporal)   Resp 15   Ht 1.545 m (5' 0.83\")   Wt 72.7 kg (160 lb 3.2 oz)   SpO2 97%   BMI 30.44 kg/m   Estimated body mass index is 30.44 kg/m  as calculated from the following:    Height as of this encounter: 1.545 m (5' 0.83\").    Weight as of this encounter: 72.7 kg (160 lb 3.2 oz).  Medication Reconciliation: complete    Fiona Nicholas      SUBJECTIVE:  Radha Ruiz  is a 44 year old female who comes in today for complete evaluation.  I last saw her about a year ago. We sent a referral for colonoscopy last year that got canceled because of her family history of colon cancer and she has not yet rescheduled.  She is due for mammogram. She is up-to-date on immunizations.    She is still smoking.    She continues on Effexor XR.  She wonders about an increase. She has tolerated 225 before.     PHQ 8/29/2018 6/24/2019 8/3/2020   PHQ-9 Total Score 4 2 15   Q9: Thoughts of better off dead/self-harm past 2 weeks Not at all Not at all Not at all     ERMELINDA-7 SCORE 8/29/2018 6/24/2019 8/3/2020   Total Score 6 1 14     She continues on Linzess for IBS.    She has a lot of nasal congestion. The Flonase doesn't seem to be doing much. She has not been doing the Netti pot. No color. Not much out the nose.       Past Medical, Family, and Social History reviewed and updated as noted below.   ROS is negative except as noted above       Allergies   Allergen Reactions     Sulfa Drugs Hives   ,   Family History   Problem Relation Age of Onset     Colon Cancer Father 38        Cancer-colon,Colon cancer     Other - See Comments Mother         Psychiatric illness,Untreated anxiety/D&C for postmenopausal bleeding benign     Cancer Maternal Grandfather         Cancer,Brain     " Heart Disease Maternal Grandfather         Heart Disease,MI     Hypertension Maternal Grandfather         Hypertension     Other - See Comments Maternal Uncle         Hemochromatosis     Hypertension Maternal Grandmother         Hypertension     Other - See Comments Maternal Grandmother         Alzheimer's     Diabetes Other         Diabetes,Diabetes     Heart Disease Paternal Grandmother         Heart Disease,CHF     Cancer Maternal Uncle         Cancer,cancer all over.     Cancer Paternal Uncle         Cancer,Lung cancer     Breast Cancer No family hx of         Cancer-breast   ,   Current Outpatient Medications   Medication     [employer] ALLERGY RELIEF-D 5-120 MG 12 hr tablet     fluticasone (FLONASE) 50 MCG/ACT nasal spray     ibuprofen (ADVIL/MOTRIN) 200 MG tablet     linaclotide (LINZESS) 72 MCG capsule     traZODone (DESYREL) 100 MG tablet     venlafaxine (EFFEXOR-XR) 150 MG 24 hr capsule     venlafaxine (EFFEXOR-XR) 75 MG 24 hr capsule     No current facility-administered medications for this visit.    ,   Past Medical History:   Diagnosis Date     Encounter for gynecological examination without abnormal finding     08,Satisfactory GYN examination     Other pulmonary embolism without acute cor pulmonale (H)     History of pulmonary emboli after      Personal history of other medical treatment (CODE)      2, para 1-0-1-1     Personal history of other medical treatment (CODE)     10/00,History of blood transfusion with    ,   Patient Active Problem List    Diagnosis Date Noted     Tobacco abuse 2018     Priority: Medium     Chronic allergic rhinitis due to pollen, unspecified seasonality 2018     Priority: Medium     Anxiety state 2018     Priority: Medium     Constipation 2018     Priority: Medium     Migraine headache 2018     Priority: Medium     Restless leg syndrome 2018     Priority: Medium     Surgical menopause 2015     Priority: Medium  "    H/O adenomatous polyp of colon 10/06/2014     Priority: Medium     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     ,   Past Surgical History:   Procedure Laterality Date     ant/post colporr w enterocele incl cysturethroscopy  2017    Dr. Mc      SECTION      10/00 /06,History of blood transfusion with      COLONOSCOPY      ,,,F/U 2019     Cryotherapy of Cervix       LAPAROSCOPIC ASSISTED HYSTERECTOMY VAGINAL, BILATERAL SALPINGO-OOPHORECTOMY, COMBINED      Ney Hart MD [facility]     LAPAROSCOPIC TUBAL LIGATION          and   Social History     Tobacco Use     Smoking status: Current Every Day Smoker     Packs/day: 0.13     Types: Cigarettes     Smokeless tobacco: Never Used   Substance Use Topics     Alcohol use: Yes     Frequency: 4 or more times a week     Drinks per session: 1 or 2     Binge frequency: Never     Comment: Alcoholic Drinks/day: very rare     OBJECTIVE:  /70 (BP Location: Right arm, Patient Position: Sitting, Cuff Size: Adult Regular)   Pulse 90   Temp 97.4  F (36.3  C) (Temporal)   Resp 15   Ht 1.545 m (5' 0.83\")   Wt 72.7 kg (160 lb 3.2 oz)   SpO2 97%   BMI 30.44 kg/m     EXAM:  General Appearance: Pleasant, alert, appropriate appearance for age. No acute distress  Head Exam: Normal. Normocephalic, atraumatic.  Eye Exam: PERRLA, EOMI, conjunctivae, sclerae normal.  Ear Exam: Normal TM's bilaterally. Normal auditory canals and external ears. Non-tender.  Nose Exam: Normal external nose, mucus membranes, and septum.  OroPharynx Exam:  Dental hygiene adequate. Normal buccal mucosa. Normal pharynx.  Neck Exam:  Supple, no masses or nodes. No bruits  Thyroid Exam: No nodules or " enlargement.  Chest/Respiratory Exam: Normal chest wall and respirations. Clear to auscultation.  Breast Exam: No dimpling, nipple retraction or discharge. No masses or nodes.  Cardiovascular Exam: Regular rate and rhythm. S1, S2, no murmur, click, gallop, or rubs.  Gastrointestinal Exam: Soft, non-tender, no masses or organomegaly.  Lymphatic Exam: Non-palpable nodes in neck,clavicular, axillary, or inguinal regions.  Musculoskeletal Exam: Back is straight and non-tender, full ROM of upper and lower extremities.  Foot Exam: Left and right foot: good pedal pulses   Skin: no rash or abnormalities  Neurologic Exam:  normal gross motor, tone coordination and no tremor.  Psychiatric Exam: Alert and oriented - appropriate affect.     Results for orders placed or performed in visit on 08/03/20   Thyrotropin GH     Status: None   Result Value Ref Range    Thyrotropin 1.47 0.34 - 5.60 IU/mL   Lipid Profile     Status: Abnormal   Result Value Ref Range    Cholesterol 186 <200 mg/dL    Triglycerides 82 <150 mg/dL    HDL Cholesterol 64 23 - 92 mg/dL    LDL Cholesterol Calculated 106 (H) <100 mg/dL    Non HDL Cholesterol 122 <130 mg/dL   Comprehensive metabolic panel     Status: Abnormal   Result Value Ref Range    Sodium 139 134 - 144 mmol/L    Potassium 4.1 3.5 - 5.1 mmol/L    Chloride 103 98 - 107 mmol/L    Carbon Dioxide 28 21 - 31 mmol/L    Anion Gap 8 3 - 14 mmol/L    Glucose 96 70 - 105 mg/dL    Urea Nitrogen 6 (L) 7 - 25 mg/dL    Creatinine 0.94 0.60 - 1.20 mg/dL    GFR Estimate 64 >60 mL/min/[1.73_m2]    GFR Estimate If Black 78 >60 mL/min/[1.73_m2]    Calcium 9.8 8.6 - 10.3 mg/dL    Bilirubin Total 0.3 0.3 - 1.0 mg/dL    Albumin 5.0 3.5 - 5.7 g/dL    Protein Total 7.8 6.4 - 8.9 g/dL    Alkaline Phosphatase 51 34 - 104 U/L    ALT 25 7 - 52 U/L    AST 30 13 - 39 U/L   CBC with platelets differential     Status: None   Result Value Ref Range    WBC 6.8 4.0 - 11.0 10e9/L    RBC Count 4.20 3.8 - 5.2 10e12/L    Hemoglobin  13.8 11.7 - 15.7 g/dL    Hematocrit 40.9 35.0 - 47.0 %    MCV 97 78 - 100 fl    MCH 32.9 26.5 - 33.0 pg    MCHC 33.7 31.5 - 36.5 g/dL    RDW 12.1 10.0 - 15.0 %    Platelet Count 277 150 - 450 10e9/L    Diff Method Automated Method     % Neutrophils 54.5 %    % Lymphocytes 37.3 %    % Monocytes 5.7 %    % Eosinophils 1.5 %    % Basophils 0.7 %    % Immature Granulocytes 0.3 %    Absolute Neutrophil 3.7 1.6 - 8.3 10e9/L    Absolute Lymphocytes 2.5 0.8 - 5.3 10e9/L    Absolute Monocytes 0.4 0.0 - 1.3 10e9/L    Absolute Eosinophils 0.1 0.0 - 0.7 10e9/L    Absolute Basophils 0.1 0.0 - 0.2 10e9/L    Abs Immature Granulocytes 0.0 0 - 0.4 10e9/L      ASSESSMENT/Plan :    Radha was seen today for physical.    Diagnoses and all orders for this visit:    Visit for preventive health examination    Screening for deficiency anemia  -     CBC with platelets differential; Future  -     CBC with platelets differential    Screening for metabolic disorder  -     Comprehensive metabolic panel; Future  -     Comprehensive metabolic panel    Screening for hyperlipidemia  -     Lipid Profile; Future  -     Lipid Profile    Screening for hypothyroidism  -     Thyrotropin GH; Future  -     Thyrotropin GH    Visit for screening mammogram  -     MA Screen Bilateral w/Tonny; Future    FH: colon cancer    H/O adenomatous polyp of colon  -     GASTROENTEROLOGY ADULT REF PROCEDURE ONLY; Future    Tobacco abuse    Irritable bowel syndrome, unspecified type  -     linaclotide (LINZESS) 72 MCG capsule; Take 1 capsule (72 mcg) by mouth daily    Depression, major, recurrent, in partial remission (H)  -     venlafaxine (EFFEXOR-XR) 75 MG 24 hr capsule; Take 1 capsule (75 mg) by mouth daily Take with the 150 for a total of 225 mg daily.    Anxiety state  -     venlafaxine (EFFEXOR-XR) 75 MG 24 hr capsule; Take 1 capsule (75 mg) by mouth daily Take with the 150 for a total of 225 mg daily.      Will notify of lab results when available. Discussed diet,  exercise and healthy lifestyle changes. Mammogram scheduled.  Colonoscopy ordered.  She should be at low risk for this procedure.    Discussed smoking cessation.    Because of her increased stressors and the stressors of the pandemic, elected to increase her Effexor back up to 225 mg daily for a few months.  She will keep in touch with her progress.    Rsahad Adhikari MD

## 2020-08-03 NOTE — NURSING NOTE
"Chief Complaint   Patient presents with     Physical       Initial /70 (BP Location: Right arm, Patient Position: Sitting, Cuff Size: Adult Regular)   Pulse 90   Temp 97.4  F (36.3  C) (Temporal)   Resp 15   Ht 1.545 m (5' 0.83\")   Wt 72.7 kg (160 lb 3.2 oz)   SpO2 97%   BMI 30.44 kg/m   Estimated body mass index is 30.44 kg/m  as calculated from the following:    Height as of this encounter: 1.545 m (5' 0.83\").    Weight as of this encounter: 72.7 kg (160 lb 3.2 oz).  Medication Reconciliation: complete    Fiona Nicholas  "

## 2020-08-04 ASSESSMENT — ANXIETY QUESTIONNAIRES: GAD7 TOTAL SCORE: 14

## 2020-08-05 RX ORDER — LINACLOTIDE 72 UG/1
CAPSULE, GELATIN COATED ORAL
Qty: 90 CAPSULE | Refills: 11 | OUTPATIENT
Start: 2020-08-05

## 2020-08-05 NOTE — TELEPHONE ENCOUNTER
Disp  Refills  Start  End  [facility]    linaclotide (LINZESS) 72 MCG capsule  90 capsule  11  8/3/2020   No    Sig - Route: Take 1 capsule (72 mcg) by mouth daily - Oral        LOV: 8/3/2020  Future Office visit: No future appointment scheduled at this time.      Denying request as it is a duplicate.    Requested Prescriptions   Pending Prescriptions Disp Refills     LINZESS 72 MCG capsule [Pharmacy Med Name: LINZESS 72 MCG CAPSULE] 90 capsule 11     Sig: TAKE 1 CAPSULE (72 MCG) BY MOUTH DAILY       There is no refill protocol information for this order      Kathy Roblero RN  ....................  8/5/2020   10:40 AM

## 2020-08-06 DIAGNOSIS — Z12.11 SPECIAL SCREENING FOR MALIGNANT NEOPLASMS, COLON: Primary | ICD-10-CM

## 2020-08-06 NOTE — TELEPHONE ENCOUNTER
Screening Questions for the Scheduling of Screening Colonoscopies   (If Colonoscopy is diagnostic, Provider should review the chart before scheduling.)  Are you younger than 50 or older than 80?  YES   Do you take aspirin or fish oil?  YES - FISH OIL   (if yes, tell patient to stop 1 week prior to Colonoscopy)  Do you take warfarin (Coumadin), clopidogrel (Plavix), apixaban (Eliquis), dabigatram (Pradaxa), rivaroxaban (Xarelto) or any blood thinner? NO   Do you use oxygen at home?  NO   Do you have kidney disease? NO   Are you on dialysis? NO   Have you had a stroke or heart attack in the last year? NO   Have you had a stent in your heart or any blood vessel in the last year? NO  Have you had a transplant of any organ? NO   Have you had a colonoscopy or upper endoscopy (EGD) before? YES          When?  2014  Date of scheduled Colonoscopy. 09/16/2020  Provider DOROTHY  Pharmacy [facility]

## 2020-08-07 RX ORDER — BISACODYL 5 MG/1
TABLET, DELAYED RELEASE ORAL
Qty: 2 TABLET | Refills: 0 | Status: ON HOLD | OUTPATIENT
Start: 2020-08-07 | End: 2020-09-16

## 2020-08-07 RX ORDER — POLYETHYLENE GLYCOL 3350, SODIUM CHLORIDE, SODIUM BICARBONATE, POTASSIUM CHLORIDE 420; 11.2; 5.72; 1.48 G/4L; G/4L; G/4L; G/4L
4000 POWDER, FOR SOLUTION ORAL ONCE
Qty: 4000 ML | Refills: 0 | Status: SHIPPED | OUTPATIENT
Start: 2020-08-07 | End: 2020-08-07

## 2020-09-08 DIAGNOSIS — Z01.818 PRE-OP TESTING: Primary | ICD-10-CM

## 2020-09-11 DIAGNOSIS — J30.1 SEASONAL ALLERGIC RHINITIS DUE TO POLLEN: ICD-10-CM

## 2020-09-13 ENCOUNTER — ALLIED HEALTH/NURSE VISIT (OUTPATIENT)
Dept: FAMILY MEDICINE | Facility: OTHER | Age: 45
End: 2020-09-13
Attending: SURGERY
Payer: COMMERCIAL

## 2020-09-13 DIAGNOSIS — Z01.818 PRE-OP TESTING: ICD-10-CM

## 2020-09-13 LAB
SARS-COV-2 RNA SPEC QL NAA+PROBE: NORMAL
SPECIMEN SOURCE: NORMAL

## 2020-09-13 PROCEDURE — 99207 ZZC NO CHARGE NURSE ONLY: CPT

## 2020-09-13 PROCEDURE — C9803 HOPD COVID-19 SPEC COLLECT: HCPCS

## 2020-09-13 PROCEDURE — U0003 INFECTIOUS AGENT DETECTION BY NUCLEIC ACID (DNA OR RNA); SEVERE ACUTE RESPIRATORY SYNDROME CORONAVIRUS 2 (SARS-COV-2) (CORONAVIRUS DISEASE [COVID-19]), AMPLIFIED PROBE TECHNIQUE, MAKING USE OF HIGH THROUGHPUT TECHNOLOGIES AS DESCRIBED BY CMS-2020-01-R: HCPCS | Mod: ZL | Performed by: SURGERY

## 2020-09-14 LAB
LABORATORY COMMENT REPORT: NORMAL
SARS-COV-2 RNA SPEC QL NAA+PROBE: NEGATIVE
SPECIMEN SOURCE: NORMAL

## 2020-09-14 RX ORDER — FLUTICASONE PROPIONATE 50 MCG
SPRAY, SUSPENSION (ML) NASAL
Qty: 48 ML | Refills: 3 | Status: SHIPPED | OUTPATIENT
Start: 2020-09-14 | End: 2021-09-23

## 2020-09-14 NOTE — TELEPHONE ENCOUNTER
" Disp  Refills  Start  End  [facility]    fluticasone (FLONASE) 50 MCG/ACT nasal spray  18.2 mL  11  6/24/2019   No        LOV: 8/3/2020  Future Office visit:No future appointment scheduled at this time.       Requested Prescriptions   Pending Prescriptions Disp Refills     fluticasone (FLONASE) 50 MCG/ACT nasal spray [Pharmacy Med Name: FLUTICASONE PROP 50 MCG SPRAY] 48 mL 3     Sig: INHALE 2 SPRAYS INTO BOTH NOSTRILS ONCE DAILY.       Nasal Allergy Protocol Passed - 9/11/2020  2:29 PM        Passed - Patient is age 12 or older        Passed - Recent (12 mo) or future (30 days) visit within the authorizing provider's specialty     Patient has had an office visit with the authorizing provider or a provider within the authorizing providers department within the previous 12 mos or has a future within next 30 days. See \"Patient Info\" tab in inbasket, or \"Choose Columns\" in Meds & Orders section of the refill encounter.              Passed - Medication is active on med list         Kathy Roblero RN  ....................  9/14/2020   12:50 PM    "

## 2020-09-16 ENCOUNTER — ANESTHESIA (OUTPATIENT)
Dept: SURGERY | Facility: OTHER | Age: 45
End: 2020-09-16
Payer: COMMERCIAL

## 2020-09-16 ENCOUNTER — HOSPITAL ENCOUNTER (OUTPATIENT)
Facility: OTHER | Age: 45
Discharge: HOME | End: 2020-09-16
Attending: SURGERY | Admitting: SURGERY
Payer: COMMERCIAL

## 2020-09-16 ENCOUNTER — ANESTHESIA EVENT (OUTPATIENT)
Dept: SURGERY | Facility: OTHER | Age: 45
End: 2020-09-16
Payer: COMMERCIAL

## 2020-09-16 VITALS
SYSTOLIC BLOOD PRESSURE: 121 MMHG | HEART RATE: 66 BPM | DIASTOLIC BLOOD PRESSURE: 77 MMHG | OXYGEN SATURATION: 99 % | BODY MASS INDEX: 28.52 KG/M2 | HEIGHT: 62 IN | TEMPERATURE: 97.9 F | RESPIRATION RATE: 12 BRPM | WEIGHT: 155 LBS

## 2020-09-16 DIAGNOSIS — K57.30 DIVERTICULOSIS OF COLON WITHOUT DIVERTICULITIS: ICD-10-CM

## 2020-09-16 DIAGNOSIS — K63.5 POLYP OF SIGMOID COLON, UNSPECIFIED TYPE: ICD-10-CM

## 2020-09-16 DIAGNOSIS — K63.5 CECAL POLYP: Primary | ICD-10-CM

## 2020-09-16 PROCEDURE — 25800030 ZZH RX IP 258 OP 636: Performed by: SURGERY

## 2020-09-16 PROCEDURE — 25000128 H RX IP 250 OP 636: Performed by: NURSE ANESTHETIST, CERTIFIED REGISTERED

## 2020-09-16 PROCEDURE — 45385 COLONOSCOPY W/LESION REMOVAL: CPT | Mod: PT

## 2020-09-16 PROCEDURE — 45385 COLONOSCOPY W/LESION REMOVAL: CPT | Mod: PT | Performed by: SURGERY

## 2020-09-16 PROCEDURE — 45385 COLONOSCOPY W/LESION REMOVAL: CPT | Performed by: NURSE ANESTHETIST, CERTIFIED REGISTERED

## 2020-09-16 PROCEDURE — 45380 COLONOSCOPY AND BIOPSY: CPT | Mod: PT | Performed by: SURGERY

## 2020-09-16 PROCEDURE — 25000125 ZZHC RX 250: Performed by: NURSE ANESTHETIST, CERTIFIED REGISTERED

## 2020-09-16 PROCEDURE — 25000125 ZZHC RX 250: Performed by: SURGERY

## 2020-09-16 PROCEDURE — 45380 COLONOSCOPY AND BIOPSY: CPT | Mod: PT,XU | Performed by: SURGERY

## 2020-09-16 PROCEDURE — 88305 TISSUE EXAM BY PATHOLOGIST: CPT

## 2020-09-16 RX ORDER — ONDANSETRON 2 MG/ML
4 INJECTION INTRAMUSCULAR; INTRAVENOUS
Status: DISCONTINUED | OUTPATIENT
Start: 2020-09-16 | End: 2020-09-16 | Stop reason: HOSPADM

## 2020-09-16 RX ORDER — PROPOFOL 10 MG/ML
INJECTION, EMULSION INTRAVENOUS CONTINUOUS PRN
Status: DISCONTINUED | OUTPATIENT
Start: 2020-09-16 | End: 2020-09-16

## 2020-09-16 RX ORDER — ONDANSETRON 2 MG/ML
4 INJECTION INTRAMUSCULAR; INTRAVENOUS EVERY 6 HOURS PRN
Status: DISCONTINUED | OUTPATIENT
Start: 2020-09-16 | End: 2020-09-16 | Stop reason: HOSPADM

## 2020-09-16 RX ORDER — LIDOCAINE HYDROCHLORIDE 20 MG/ML
INJECTION, SOLUTION INFILTRATION; PERINEURAL PRN
Status: DISCONTINUED | OUTPATIENT
Start: 2020-09-16 | End: 2020-09-16

## 2020-09-16 RX ORDER — LIDOCAINE 40 MG/G
CREAM TOPICAL
Status: DISCONTINUED | OUTPATIENT
Start: 2020-09-16 | End: 2020-09-16 | Stop reason: HOSPADM

## 2020-09-16 RX ORDER — FLUMAZENIL 0.1 MG/ML
0.2 INJECTION, SOLUTION INTRAVENOUS
Status: DISCONTINUED | OUTPATIENT
Start: 2020-09-16 | End: 2020-09-16 | Stop reason: HOSPADM

## 2020-09-16 RX ORDER — SODIUM CHLORIDE, SODIUM LACTATE, POTASSIUM CHLORIDE, CALCIUM CHLORIDE 600; 310; 30; 20 MG/100ML; MG/100ML; MG/100ML; MG/100ML
INJECTION, SOLUTION INTRAVENOUS CONTINUOUS
Status: DISCONTINUED | OUTPATIENT
Start: 2020-09-16 | End: 2020-09-16 | Stop reason: HOSPADM

## 2020-09-16 RX ORDER — NALOXONE HYDROCHLORIDE 0.4 MG/ML
.1-.4 INJECTION, SOLUTION INTRAMUSCULAR; INTRAVENOUS; SUBCUTANEOUS
Status: DISCONTINUED | OUTPATIENT
Start: 2020-09-16 | End: 2020-09-16 | Stop reason: HOSPADM

## 2020-09-16 RX ORDER — PROPOFOL 10 MG/ML
INJECTION, EMULSION INTRAVENOUS PRN
Status: DISCONTINUED | OUTPATIENT
Start: 2020-09-16 | End: 2020-09-16

## 2020-09-16 RX ORDER — ONDANSETRON 4 MG/1
4 TABLET, ORALLY DISINTEGRATING ORAL EVERY 6 HOURS PRN
Status: DISCONTINUED | OUTPATIENT
Start: 2020-09-16 | End: 2020-09-16 | Stop reason: HOSPADM

## 2020-09-16 RX ADMIN — PROPOFOL 140 MCG/KG/MIN: 10 INJECTION, EMULSION INTRAVENOUS at 08:52

## 2020-09-16 RX ADMIN — LIDOCAINE HYDROCHLORIDE 40 MG: 20 INJECTION, SOLUTION INFILTRATION; PERINEURAL at 08:52

## 2020-09-16 RX ADMIN — SODIUM CHLORIDE, POTASSIUM CHLORIDE, SODIUM LACTATE AND CALCIUM CHLORIDE: 600; 310; 30; 20 INJECTION, SOLUTION INTRAVENOUS at 08:28

## 2020-09-16 RX ADMIN — PROPOFOL 50 MG: 10 INJECTION, EMULSION INTRAVENOUS at 08:52

## 2020-09-16 SDOH — HEALTH STABILITY: MENTAL HEALTH: CURRENT SMOKER: 1

## 2020-09-16 ASSESSMENT — MIFFLIN-ST. JEOR: SCORE: 1291.33

## 2020-09-16 ASSESSMENT — LIFESTYLE VARIABLES: TOBACCO_USE: 1

## 2020-09-16 NOTE — OP NOTE
PROCEDURE NOTE    SURGEON: Rachel Flores MD.    PRE-OP DIAGNOSIS:  Screening Colonoscopy      POST-OP DIAGNOSIS: colon polyps, scattered diverticula sigmoid colon     Location: cecum  Size: 3 cm  Removed:  N-biopsy of apparent flat polyp       sigmoid  0.4 cm    Y-cold snare  PROCEDURE:  Colonoscopy with polypectomies and biopsy    ESTIMATEDBLOOD LOSS: none    COMPLICATIONS:  None    SPECIMEN:  Cecal biopsy, sigmoid polyp    ANESTHESIA:  See anesthesia note    INDICATION FOR THE PROCEDURE: The patient is a 45 year old female. The patient has no complaints. I explained to the patient the risks, benefits and alternatives to screening colonoscopy for evaluating the colon for colon polyps and colon cancer. We specifically discussed the risks of bleeding, infection, perforation, potential inability to reach the cecum and the risks of sedation. The patient's questions were answered and the patient wished to proceed. Informed consent paperwork was completed.    PROCEDURE: The patient was taken to the endoscopy suite. Appropriate monitors were attached. The patient was placed in the left lateral decubitus position.Timeout was performed confirming the patient's identity and procedure to be performed. After appropriate sedation was confirmed, digital rectal exam was performed. There was normal tone and no gross abnormality was noted. The lubricated colonoscope was introduced into the anus the colon was insufflated with air. The prep quality was adequate. Under direct visualization the scope was advanced to the cecum. The ileocecal valve was intubated and the terminal ileum inspected. No gross abnormality was noted. The scope was withdrawn back into the cecum. The mucosa of colon was inspected while withdrawing the scope. An apparent large flat polyp was noted in the cecum and biopsied in 2 places. A sessile polyp was noted in the sigmoid colon and removed with cold snare. The scope was retroflexed in the rectum and the  anorectal junction was inspected. No abnormalities were noted. The scope was returned to a neutral position and the colon was decompressed. The scope was removed. The patient tolerated the procedure with no immediately apparent complication. The patient was taken to recovery in stable condition.  FOLLOW UP:  RECOMMEND high fiber diet, follow up: will call with pathology results.

## 2020-09-16 NOTE — H&P
PRE-PROCEDURE NOTE    CHIEF COMPLAINT / REASON FORPROCEDURE:  Need for screening colonoscopy.    PERTINENT HISTORY   Patient with no complaints. Previous colonoscopy -tubular adenoma at 20 cm. No diarrhea, constipation, abdominal pain or rectal bleeding. Family history of colon cancer-father in his 30s.  Past Medical History:   Diagnosis Date     Encounter for gynecological examination without abnormal finding     08,Satisfactory GYN examination     Other pulmonary embolism without acute cor pulmonale (H)     History of pulmonary emboli after      Personal history of other medical treatment (CODE)      2, para 1-0-1-1     Personal history of other medical treatment (CODE)     10/00,History of blood transfusion with      Past Surgical History:   Procedure Laterality Date     ant/post colporr w enterocele incl cysturethroscopy  2017    Dr. Mc      SECTION      10/00 /06,History of blood transfusion with      COLONOSCOPY      ,,,F/U      Cryotherapy of Cervix       LAPAROSCOPIC ASSISTED HYSTERECTOMY VAGINAL, BILATERAL SALPINGO-OOPHORECTOMY, COMBINED      Ney Hart MD [facility]     LAPAROSCOPIC TUBAL LIGATION           Other:  None  Bleeding tendencies:  No    Relevant Family History:  yes, as above    Relevant Social History:  none    A relevant review of systems was performed and was Negative.    ALLERGIES/SENSITIVITIES:   Allergies   Allergen Reactions     Sulfa Drugs Hives        CURRENTMEDICATIONS:    No current facility-administered medications on file prior to encounter.   [employer] ALLERGY RELIEF-D 5-120 MG 12 hr tablet, TAKE ONE TABLET BY MOUTH TWO TIMES DAILY  ibuprofen (ADVIL/MOTRIN) 200 MG tablet, Take 200-600 mg by mouth  linaclotide (LINZESS) 72 MCG capsule, Take 1 capsule (72 mcg) by mouth daily  traZODone (DESYREL) 100 MG tablet, TAKE 3-4 TABLETS BY MOUTH AT BEDTIME.  venlafaxine (EFFEXOR-XR) 150 MG 24 hr capsule,  "TAKE 1 CAPSULE BY MOUTH EVERY DAY WITH FOOD  venlafaxine (EFFEXOR-XR) 75 MG 24 hr capsule, Take 1 capsule (75 mg) by mouth daily Take with the 150 for a total of 225 mg daily.      Current Facility-Administered Medications   Medication     lactated ringers infusion     lidocaine (LMX4) cream     lidocaine 1 % 0.1-1 mL     ondansetron (ZOFRAN) injection 4 mg     sodium chloride (PF) 0.9% PF flush 3 mL     sodium chloride (PF) 0.9% PF flush 3 mL       PRE-SEDATION ASSESSMENT:    /70   Pulse 76   Temp 97.9  F (36.6  C) (Tympanic)   Resp 16   Ht 1.575 m (5' 2\")   Wt 70.3 kg (155 lb)   SpO2 99%   BMI 28.35 kg/m    Lung Exam:  Normal  Heart Exam:  Normal    Comment(s):      IMPRESSION:  Need for screening colonoscopy.    PLAN:  I discussed screening colonoscopy with the patient.    "

## 2020-09-16 NOTE — ANESTHESIA POSTPROCEDURE EVALUATION
Patient: Radha Ruiz    Procedure(s):  COLONOSCOPY, WITH POLYPECTOMY AND BIOPSY    Diagnosis:History of colon polyps [Z86.010]  Diagnosis Additional Information: No value filed.    Anesthesia Type:  MAC    Note:  Anesthesia Post Evaluation    Patient location during evaluation: Phase 2  Patient participation: Able to fully participate in evaluation  Level of consciousness: awake and alert  Pain management: adequate  Airway patency: patent  Cardiovascular status: acceptable  Respiratory status: acceptable  Hydration status: acceptable  PONV: none             Last vitals:  Vitals:    09/16/20 0945 09/16/20 1000 09/16/20 1015   BP: 126/89 125/77 121/77   Pulse: 74 65 66   Resp: 14 12 12   Temp:      SpO2: 99% 99% 99%         Electronically Signed By: JOSE ATKINS CRNA  September 16, 2020  10:43 AM

## 2020-09-16 NOTE — ANESTHESIA CARE TRANSFER NOTE
Patient: Radha Ruiz    Procedure(s):  COLONOSCOPY, WITH POLYPECTOMY AND BIOPSY    Diagnosis: History of colon polyps [Z86.010]  Diagnosis Additional Information: No value filed.    Anesthesia Type:   MAC     Note:  Airway :Room Air  Patient transferred to:Phase II  Handoff Report: Identifed the Patient, Identified the Reponsible Provider, Reviewed the pertinent medical history, Discussed the surgical course, Reviewed Intra-OP anesthesia mangement and issues during anesthesia, Set expectations for post-procedure period and Allowed opportunity for questions and acknowledgement of understanding      Vitals: (Last set prior to Anesthesia Care Transfer)    CRNA VITALS  9/16/2020 0903 - 9/16/2020 0933      9/16/2020             Pulse:  63    Ht Rate:  63    SpO2:  98 %    Resp Rate (set):  10                Electronically Signed By: JOSE ATKINS CRNA  September 16, 2020  9:33 AM

## 2020-09-16 NOTE — DISCHARGE INSTRUCTIONS
Procedure you had done: colonoscopy with biopsy and removal of polyp  Your health care provider is:  Rashad Adhikari  Your surgeon is Dr. Rachel Acevedo.   Please call your health care provider or surgeon at [phone] if:    - you feel you are getting worse or having an increase in problems    - fever greater than 101 degrees  - increasing shortness of breath or chest pain  - any signs of infection (increasing redness, swelling, tenderness, warmth, change in appearance, or  increased drainage)  - blood in your urine or stool  - coughing or vomiting blood  - nausea (upset stomach) and vomiting and/or diarrhea that will not stop  - severe pain that is not relieved by medicine, rest or ice    [facility]  Adult Discharge Orders & Instructions    ________________________________________________________________          For 12 hours after surgery  1. Get plenty of rest.  A responsible adult must stay with you for at least 12 hours after you leave the hospital.   2. You may feel lightheaded.  IF so, sit for a few minutes before standing.  Have someone help you get up.   3. You may have a slight fever. Call the doctor if your fever is over 101 F (38.3 C) (taken under the tongue) or lasts longer than 24 hours.  4. You may have a dry mouth, a sore throat, muscle aches or trouble sleeping.  These should go away after 24 hours.  5. Do not make important or legal decisions.  6.   Do not drive or use heavy equipment.  If you have weakness or tingling, don't drive or use heavy equipment until this feeling goes away.    To contact a doctor, call   [phone]_______________________  You have had medications for sedation. Please be aware that this can cause drowsiness and impaired judgment for up to 24 hours after your procedure. Do not drive, operate power tools or drink alcohol for 24 hours.  If samples were taken-you will get a phone call and a letter with your results in the next 7-10 days. If you don't get  results, please call and let us know!       Your doctor recommends that you eat 25 to 30 Grams of fiber daily. The following are some examples of fiber amounts in different foods.    Fruits: Apple (with skin) 1 medium = 4.4 Grams   Banana      1 medium = 3.1 Grams   Oranges     1 orange = 3.1 Grams   Prunes     1 cup, pitted = 12.4 Grams    Juices: Apple, unsweetened w/ added ascorbic acid  1 cup = 0.5 Grams    Grapefruit, white, canned,sweetened  1 cup = 0.2 Grams    Grape, unsweetened w/ added ascorbic acid  1 cup = 0.5 Grams    Orange     1 cup = 0.7 Grams    Vegetables:   Cooked: Green Beans   1 cup = 4.0 Grams       Carrots   1/2 cup sliced = 2.3 Grams       Peas       1 cup = 8.8 Grams       Potato (baked, with skin)  1 medium = 3.8 Grams    Raw: Cucumber (with peel)  1 cucumber = 1.5 Grams            Lettuce     1 cup shredded = 0.5 Grams            Tomato   1 medium tomato = 1.5 Grams            Spinach  1 cup = 0.7 Grams    Legumes: Baked beans, canned, no salt added  1 cup = 13.9 Grams         Kidney Beans, canned  1 cup = 13.6 Grams         Peters Beans, canned     1 cup = 11.6 Grams         Lentils, boiled   1 cup = 15.6 Grams    Breads, Pastas, Flours: Bran muffins   1 medium muffin = 5.2 Grams           Oatmeal, cooked  1 cup = 4.0 Grams           White Bread   1 slice = 0.6 Grams           Whole- wheat bread = 1.9 Grams    Pasta and rice, cooked: Macaroni  1 cup = 2.5 Grams           Rice, Brown  1 cup = 3.5 Grams           Rice, white   1 cup = 0.6 Grams           Spaghetti (regular) 1 cup = 2.5 Grams    Nuts: Almonds   1 cup = 17.4 Grams            Peanuts    1 cup = 12.4 Grams

## 2020-09-16 NOTE — ANESTHESIA PREPROCEDURE EVALUATION
Anesthesia Pre-Procedure Evaluation    Patient: Radha Ruiz   MRN: [id number] : 1975          Preoperative Diagnosis: History of colon polyps [Z86.010]    Procedure(s):  COLONOSCOPY    Past Medical History:   Diagnosis Date     Encounter for gynecological examination without abnormal finding     08,Satisfactory GYN examination     Other pulmonary embolism without acute cor pulmonale (H)     History of pulmonary emboli after      Personal history of other medical treatment (CODE)      2, para 1-0-1-1     Personal history of other medical treatment (CODE)     10/00,History of blood transfusion with      Past Surgical History:   Procedure Laterality Date     ant/post colporr w enterocele incl cysturethroscopy  2017    Dr. Mc      SECTION      10/00 /06,History of blood transfusion with      COLONOSCOPY      ,,,F/U      Cryotherapy of Cervix       LAPAROSCOPIC ASSISTED HYSTERECTOMY VAGINAL, BILATERAL SALPINGO-OOPHORECTOMY, COMBINED      Ney Hart MD [facility]     LAPAROSCOPIC TUBAL LIGATION             Anesthesia Evaluation     . Pt has had prior anesthetic.            ROS/MED HX    ENT/Pulmonary:     (+)tobacco use, Current use .5 ppd packs/day  , . .    Neurologic:  - neg neurologic ROS     Cardiovascular:  - neg cardiovascular ROS       METS/Exercise Tolerance:  >4 METS   Hematologic:  - neg hematologic  ROS       Musculoskeletal:  - neg musculoskeletal ROS       GI/Hepatic:  - neg GI/hepatic ROS   (+) bowel prep,       Renal/Genitourinary:  - ROS Renal section negative       Endo:  - neg endo ROS       Psychiatric:  - neg psychiatric ROS       Infectious Disease:  - neg infectious disease ROS       Malignancy:      - no malignancy   Other:    - neg other ROS                      Physical Exam  Normal systems: pulmonary and dental    Airway   Mallampati: III  TM distance: <3 FB  Neck ROM: full    Dental  "    Cardiovascular   Rhythm and rate: regular and normal      Pulmonary             Lab Results   Component Value Date    WBC 6.8 08/03/2020    HGB 13.8 08/03/2020    HCT 40.9 08/03/2020     08/03/2020     08/03/2020    POTASSIUM 4.1 08/03/2020    CHLORIDE 103 08/03/2020    CO2 28 08/03/2020    BUN 6 (L) 08/03/2020    CR 0.94 08/03/2020    GLC 96 08/03/2020    MARTINEZ 9.8 08/03/2020    ALBUMIN 5.0 08/03/2020    PROTTOTAL 7.8 08/03/2020    ALT 25 08/03/2020    AST 30 08/03/2020    ALKPHOS 51 08/03/2020    BILITOTAL 0.3 08/03/2020    HCG Negative 10/17/2012       Preop Vitals  BP Readings from Last 3 Encounters:   09/16/20 113/70   08/03/20 102/70   06/24/19 119/74    Pulse Readings from Last 3 Encounters:   09/16/20 76   08/03/20 90   06/24/19 84      Resp Readings from Last 3 Encounters:   09/16/20 16   08/03/20 15   06/24/19 16    SpO2 Readings from Last 3 Encounters:   09/16/20 99%   08/03/20 97%   10/23/17 99%      Temp Readings from Last 1 Encounters:   09/16/20 97.9  F (36.6  C) (Tympanic)    Ht Readings from Last 1 Encounters:   09/16/20 1.575 m (5' 2\")      Wt Readings from Last 1 Encounters:   09/16/20 70.3 kg (155 lb)    Estimated body mass index is 28.35 kg/m  as calculated from the following:    Height as of this encounter: 1.575 m (5' 2\").    Weight as of this encounter: 70.3 kg (155 lb).       Anesthesia Plan      History & Physical Review      ASA Status:  2 .    NPO Status:  > 2 hours (water this am)    Plan for MAC with Propofol induction.       The patient is a current Smoker and patient smoked on day of surgery     Postoperative Care      Consents  Anesthetic plan, risks, benefits and alternatives discussed with:  Patient and Spouse..                 JOSE ATKINS CRNA  "

## 2020-10-02 ENCOUNTER — OFFICE VISIT (OUTPATIENT)
Dept: SURGERY | Facility: OTHER | Age: 45
End: 2020-10-02
Attending: SURGERY
Payer: COMMERCIAL

## 2020-10-02 VITALS
HEIGHT: 61 IN | SYSTOLIC BLOOD PRESSURE: 110 MMHG | BODY MASS INDEX: 30.4 KG/M2 | WEIGHT: 161 LBS | DIASTOLIC BLOOD PRESSURE: 80 MMHG | RESPIRATION RATE: 18 BRPM | HEART RATE: 88 BPM | OXYGEN SATURATION: 100 % | TEMPERATURE: 97.4 F

## 2020-10-02 DIAGNOSIS — K63.5 POLYP OF ASCENDING COLON, UNSPECIFIED TYPE: Primary | ICD-10-CM

## 2020-10-02 PROCEDURE — G0463 HOSPITAL OUTPT CLINIC VISIT: HCPCS

## 2020-10-02 PROCEDURE — 99203 OFFICE O/P NEW LOW 30 MIN: CPT | Performed by: SURGERY

## 2020-10-02 RX ORDER — CEFOTETAN AND DEXTROSE 1 G/50ML
1 INJECTION, SOLUTION INTRAVENOUS
Status: CANCELLED | OUTPATIENT
Start: 2020-10-02

## 2020-10-02 RX ORDER — ALVIMOPAN 12 MG/1
12 CAPSULE ORAL ONCE
Status: DISCONTINUED | OUTPATIENT
Start: 2020-10-02 | End: 2020-10-02

## 2020-10-02 RX ORDER — POLYETHYLENE GLYCOL 3350, SODIUM CHLORIDE, SODIUM BICARBONATE, POTASSIUM CHLORIDE 420; 11.2; 5.72; 1.48 G/4L; G/4L; G/4L; G/4L
4000 POWDER, FOR SOLUTION ORAL ONCE
Qty: 4000 ML | Refills: 0 | Status: SHIPPED | OUTPATIENT
Start: 2020-10-02 | End: 2020-10-02

## 2020-10-02 RX ORDER — ACETAMINOPHEN 325 MG/1
975 TABLET ORAL ONCE
Status: CANCELLED | OUTPATIENT
Start: 2020-10-02 | End: 2020-10-02

## 2020-10-02 RX ORDER — METRONIDAZOLE 500 MG/1
TABLET ORAL
Qty: 3 TABLET | Refills: 0 | Status: ON HOLD | OUTPATIENT
Start: 2020-10-02 | End: 2020-10-12

## 2020-10-02 RX ORDER — NEOMYCIN SULFATE 500 MG/1
TABLET ORAL
Qty: 6 TABLET | Refills: 0 | Status: ON HOLD | OUTPATIENT
Start: 2020-10-02 | End: 2020-10-12

## 2020-10-02 RX ORDER — CEFOTETAN AND DEXTROSE 1 G/50ML
1 INJECTION, SOLUTION INTRAVENOUS SEE ADMIN INSTRUCTIONS
Status: CANCELLED | OUTPATIENT
Start: 2020-10-02

## 2020-10-02 ASSESSMENT — MIFFLIN-ST. JEOR: SCORE: 1296.79

## 2020-10-02 ASSESSMENT — PAIN SCALES - GENERAL: PAINLEVEL: NO PAIN (0)

## 2020-10-02 NOTE — NURSING NOTE
"Patient comes in for consult from colonoscopy.  Modesta Hooker LPN ....................10/2/2020   9:59 AM  Chief Complaint   Patient presents with     Consult     colonoscopy results       Initial /80 (BP Location: Right arm, Patient Position: Sitting, Cuff Size: Adult Regular)   Pulse 88   Temp 97.4  F (36.3  C) (Tympanic)   Resp 18   Ht 1.54 m (5' 0.63\")   Wt 73 kg (161 lb)   SpO2 100%   BMI 30.79 kg/m   Estimated body mass index is 30.79 kg/m  as calculated from the following:    Height as of this encounter: 1.54 m (5' 0.63\").    Weight as of this encounter: 73 kg (161 lb).  Medication Reconciliation: complete    Modesta Hooker LPN    "

## 2020-10-02 NOTE — H&P (VIEW-ONLY)
GENERAL SURGERY CONSULTATION NOTE    Radha Ruiz   [address]  [facility] [place] [address]  45 year old  female  Admission Date/Time: No admission date for patient encounter.  Primary Care Provider:  Rashad Adhikari was asked to see this patient by Dr. Acevedo for evaluation of cecal polyp.     HPI: Radha Ruiz is a 45 year old female with a family hx of early colon cancer (age 39 in father) who presented for screening. Pt was found to have a flat irregular area in the cecum.  Biopsies show sessile serrated adenoma from this area. No EMR or completion polypectomy was attempted as this was not clearly defines and large 4-5 cm.  Pt notes intermittent bloating and constipation. No blood per rectum.      Hx of PE with pregnancy and growth restriction on second pregnancy requiring anticoagulation.     Had lap assist hyst since then without issues (on lov ppx)     REVIEW OF SYSTEMS:    GENERAL: No fevers or chills. Denies fatigue, recent weight loss.  HEENT: No sinus drainage. No changes with vision or hearing. No difficulty swallowing.   LYMPHATICS:  No swollen nodes in axilla, neck or groin.  CARDIOVASCULAR: Denies chest pain, palpitations and dyspnea on exertion.  PULMONARY: No shortness of breath or cough. No increase in sputum production.  GI: Denies melena, bright red blood in stools. No hematemesis. NO diarrhea.  : No dysuria or hematuria.  SKIN: No recent rashes or ulcers.   HEMATOLOGY:  No history of easy bruising or bleeding.  ENDOCRINE:  No history of diabetes or thyroid problems.  NEUROLOGY:  No history of seizures or headaches. No motor or sensory changes.        Patient Active Problem List   Diagnosis     Anxiety state     Constipation     Depression, major, recurrent, in partial remission (H)     FH: colon cancer     H/O adenomatous polyp of colon     Melanosis coli     Migraine headache     Restless leg syndrome     Surgical menopause     Tinea versicolor     Chronic allergic rhinitis due to  pollen, unspecified seasonality     Tobacco abuse       Past Medical History:   Diagnosis Date     Encounter for gynecological examination without abnormal finding     08,Satisfactory GYN examination     Other pulmonary embolism without acute cor pulmonale (H)     History of pulmonary emboli after      Personal history of other medical treatment (CODE)      2, para 1-0-1-1     Personal history of other medical treatment (CODE)     10/00,History of blood transfusion with        Past Surgical History:   Procedure Laterality Date     ant/post colporr w enterocele incl cysturethroscopy  2017    Dr. Mc      SECTION      10/00 /06,History of blood transfusion with      COLONOSCOPY      ,,,F/U      COLONOSCOPY N/A 2020    Procedure: COLONOSCOPY, WITH POLYPECTOMY AND BIOPSY;  Surgeon: Rachel Acevedo MD;  Location: [facility] OR     Cryotherapy of Cervix       LAPAROSCOPIC ASSISTED HYSTERECTOMY VAGINAL, BILATERAL SALPINGO-OOPHORECTOMY, COMBINED      Ney Hart MD [facility]     LAPAROSCOPIC TUBAL LIGATION      2006       Family History   Problem Relation Age of Onset     Colon Cancer Father 38        Cancer-colon,Colon cancer     Other - See Comments Mother         Psychiatric illness,Untreated anxiety/D&C for postmenopausal bleeding benign     Cancer Maternal Grandfather         Cancer,Brain     Heart Disease Maternal Grandfather         Heart Disease,MI     Hypertension Maternal Grandfather         Hypertension     Other - See Comments Maternal Uncle         Hemochromatosis     Hypertension Maternal Grandmother         Hypertension     Other - See Comments Maternal Grandmother         Alzheimer's     Diabetes Other         Diabetes,Diabetes     Heart Disease Paternal Grandmother         Heart Disease,CHF     Cancer Maternal Uncle         Cancer,cancer all over.     Cancer Paternal Uncle         Cancer,Lung cancer     Breast Cancer No family  "hx of         Cancer-breast       Social History     Social History Narrative    Worked for  for [employer].      Currently staying at home 2012    , has two children, lives with her  in a home that has a forced air furnace.  She does not have indoor pets          Shaun    Son             Vahid Lo              [employer] ALLERGY RELIEF-D 5-120 MG 12 hr tablet, TAKE ONE TABLET BY MOUTH TWO TIMES DAILY       fluticasone (FLONASE) 50 MCG/ACT nasal spray, INHALE 2 SPRAYS INTO BOTH NOSTRILS ONCE DAILY.       ibuprofen (ADVIL/MOTRIN) 200 MG tablet, Take 200-600 mg by mouth       linaclotide (LINZESS) 72 MCG capsule, Take 1 capsule (72 mcg) by mouth daily       traZODone (DESYREL) 100 MG tablet, TAKE 3-4 TABLETS BY MOUTH AT BEDTIME.       venlafaxine (EFFEXOR-XR) 150 MG 24 hr capsule, TAKE 1 CAPSULE BY MOUTH EVERY DAY WITH FOOD       venlafaxine (EFFEXOR-XR) 75 MG 24 hr capsule, Take 1 capsule (75 mg) by mouth daily Take with the 150 for a total of 225 mg daily.    No current facility-administered medications on file prior to visit.         ALLERGIES/SENSITIVITIES:   Allergies   Allergen Reactions     Sulfa Drugs Hives       PHYSICAL EXAM:     /80 (BP Location: Right arm, Patient Position: Sitting, Cuff Size: Adult Regular)   Pulse 88   Temp 97.4  F (36.3  C) (Tympanic)   Resp 18   Ht 1.54 m (5' 0.63\")   Wt 73 kg (161 lb)   SpO2 100%   BMI 30.79 kg/m      General Appearance:   Appears well   Heart & CV:  RRR no murmur.  Intact distal pulses, good cap refill.  LUNGS:  CTA B/L, no wheezing or crackles.  Abd:  Flat, soft, nontender  Ext: No deformity.       ADDITIONAL COMMENTS:      CONSULTATION ASSESSMENT AND PLAN:    45 year old female with technically difficult polyp in the cecum with large diameter and non-discrete bordered.  Offered EMR with serial surveillance vs colectomy. Pt states she would likely worry about recurrence and transformation if " serial colonoscopy was pursued.  Right colectomy is reasonable for a difficult cecal polyp and pt is a good surgical candidate.     - Discussed risks including bleeding, infection, leak, hernia, injury to other structures, post op bowel habit changes as well as scaring.       - Plan for lap right colectomy on 10/12/2020    Chucho Mercado MD on 10/2/2020 at 10:46 AM

## 2020-10-02 NOTE — PROGRESS NOTES
GENERAL SURGERY CONSULTATION NOTE    Radha Ruiz   [address]  [facility] [place] [address]  45 year old  female  Admission Date/Time: No admission date for patient encounter.  Primary Care Provider:  Rashad Adhikari was asked to see this patient by Dr. Acevedo for evaluation of cecal polyp.     HPI: Radha Ruiz is a 45 year old female with a family hx of early colon cancer (age 39 in father) who presented for screening. Pt was found to have a flat irregular area in the cecum.  Biopsies show sessile serrated adenoma from this area. No EMR or completion polypectomy was attempted as this was not clearly defines and large 4-5 cm.  Pt notes intermittent bloating and constipation. No blood per rectum.      Hx of PE with pregnancy and growth restriction on second pregnancy requiring anticoagulation.     Had lap assist hyst since then without issues (on lov ppx)     REVIEW OF SYSTEMS:    GENERAL: No fevers or chills. Denies fatigue, recent weight loss.  HEENT: No sinus drainage. No changes with vision or hearing. No difficulty swallowing.   LYMPHATICS:  No swollen nodes in axilla, neck or groin.  CARDIOVASCULAR: Denies chest pain, palpitations and dyspnea on exertion.  PULMONARY: No shortness of breath or cough. No increase in sputum production.  GI: Denies melena, bright red blood in stools. No hematemesis. NO diarrhea.  : No dysuria or hematuria.  SKIN: No recent rashes or ulcers.   HEMATOLOGY:  No history of easy bruising or bleeding.  ENDOCRINE:  No history of diabetes or thyroid problems.  NEUROLOGY:  No history of seizures or headaches. No motor or sensory changes.        Patient Active Problem List   Diagnosis     Anxiety state     Constipation     Depression, major, recurrent, in partial remission (H)     FH: colon cancer     H/O adenomatous polyp of colon     Melanosis coli     Migraine headache     Restless leg syndrome     Surgical menopause     Tinea versicolor     Chronic allergic rhinitis due to  pollen, unspecified seasonality     Tobacco abuse       Past Medical History:   Diagnosis Date     Encounter for gynecological examination without abnormal finding     08,Satisfactory GYN examination     Other pulmonary embolism without acute cor pulmonale (H)     History of pulmonary emboli after      Personal history of other medical treatment (CODE)      2, para 1-0-1-1     Personal history of other medical treatment (CODE)     10/00,History of blood transfusion with        Past Surgical History:   Procedure Laterality Date     ant/post colporr w enterocele incl cysturethroscopy  2017    Dr. Mc      SECTION      10/00 /06,History of blood transfusion with      COLONOSCOPY      ,,,F/U      COLONOSCOPY N/A 2020    Procedure: COLONOSCOPY, WITH POLYPECTOMY AND BIOPSY;  Surgeon: Rachel Acevedo MD;  Location: [facility] OR     Cryotherapy of Cervix       LAPAROSCOPIC ASSISTED HYSTERECTOMY VAGINAL, BILATERAL SALPINGO-OOPHORECTOMY, COMBINED      Ney Hart MD [facility]     LAPAROSCOPIC TUBAL LIGATION      2006       Family History   Problem Relation Age of Onset     Colon Cancer Father 38        Cancer-colon,Colon cancer     Other - See Comments Mother         Psychiatric illness,Untreated anxiety/D&C for postmenopausal bleeding benign     Cancer Maternal Grandfather         Cancer,Brain     Heart Disease Maternal Grandfather         Heart Disease,MI     Hypertension Maternal Grandfather         Hypertension     Other - See Comments Maternal Uncle         Hemochromatosis     Hypertension Maternal Grandmother         Hypertension     Other - See Comments Maternal Grandmother         Alzheimer's     Diabetes Other         Diabetes,Diabetes     Heart Disease Paternal Grandmother         Heart Disease,CHF     Cancer Maternal Uncle         Cancer,cancer all over.     Cancer Paternal Uncle         Cancer,Lung cancer     Breast Cancer No family  "hx of         Cancer-breast       Social History     Social History Narrative    Worked for  for [employer].      Currently staying at home 2012    , has two children, lives with her  in a home that has a forced air furnace.  She does not have indoor pets          Shaun    Son             Vahid Lo              [employer]S ALLERGY RELIEF-D 5-120 MG 12 hr tablet, TAKE ONE TABLET BY MOUTH TWO TIMES DAILY       fluticasone (FLONASE) 50 MCG/ACT nasal spray, INHALE 2 SPRAYS INTO BOTH NOSTRILS ONCE DAILY.       ibuprofen (ADVIL/MOTRIN) 200 MG tablet, Take 200-600 mg by mouth       linaclotide (LINZESS) 72 MCG capsule, Take 1 capsule (72 mcg) by mouth daily       traZODone (DESYREL) 100 MG tablet, TAKE 3-4 TABLETS BY MOUTH AT BEDTIME.       venlafaxine (EFFEXOR-XR) 150 MG 24 hr capsule, TAKE 1 CAPSULE BY MOUTH EVERY DAY WITH FOOD       venlafaxine (EFFEXOR-XR) 75 MG 24 hr capsule, Take 1 capsule (75 mg) by mouth daily Take with the 150 for a total of 225 mg daily.    No current facility-administered medications on file prior to visit.         ALLERGIES/SENSITIVITIES:   Allergies   Allergen Reactions     Sulfa Drugs Hives       PHYSICAL EXAM:     /80 (BP Location: Right arm, Patient Position: Sitting, Cuff Size: Adult Regular)   Pulse 88   Temp 97.4  F (36.3  C) (Tympanic)   Resp 18   Ht 1.54 m (5' 0.63\")   Wt 73 kg (161 lb)   SpO2 100%   BMI 30.79 kg/m      General Appearance:   Appears well   Heart & CV:  RRR no murmur.  Intact distal pulses, good cap refill.  LUNGS:  CTA B/L, no wheezing or crackles.  Abd:  Flat, soft, nontender  Ext: No deformity.       ADDITIONAL COMMENTS:      CONSULTATION ASSESSMENT AND PLAN:    45 year old female with technically difficult polyp in the cecum with large diameter and non-discrete bordered.  Offered EMR with serial surveillance vs colectomy. Pt states she would likely worry about recurrence and transformation if " serial colonoscopy was pursued.  Right colectomy is reasonable for a difficult cecal polyp and pt is a good surgical candidate.     - Discussed risks including bleeding, infection, leak, hernia, injury to other structures, post op bowel habit changes as well as scaring.       - Plan for lap right colectomy on 10/12/2020    Chucho Mercado MD on 10/2/2020 at 10:46 AM

## 2020-10-08 ENCOUNTER — ALLIED HEALTH/NURSE VISIT (OUTPATIENT)
Dept: FAMILY MEDICINE | Facility: OTHER | Age: 45
End: 2020-10-08
Attending: SURGERY
Payer: COMMERCIAL

## 2020-10-08 DIAGNOSIS — Z01.818 PREOP GENERAL PHYSICAL EXAM: Primary | ICD-10-CM

## 2020-10-08 PROCEDURE — U0003 INFECTIOUS AGENT DETECTION BY NUCLEIC ACID (DNA OR RNA); SEVERE ACUTE RESPIRATORY SYNDROME CORONAVIRUS 2 (SARS-COV-2) (CORONAVIRUS DISEASE [COVID-19]), AMPLIFIED PROBE TECHNIQUE, MAKING USE OF HIGH THROUGHPUT TECHNOLOGIES AS DESCRIBED BY CMS-2020-01-R: HCPCS | Mod: ZL | Performed by: SURGERY

## 2020-10-08 PROCEDURE — C9803 HOPD COVID-19 SPEC COLLECT: HCPCS

## 2020-10-08 PROCEDURE — 99207 PR NO CHARGE NURSE ONLY: CPT

## 2020-10-09 ENCOUNTER — ANESTHESIA EVENT (OUTPATIENT)
Dept: SURGERY | Facility: OTHER | Age: 45
End: 2020-10-09
Payer: COMMERCIAL

## 2020-10-09 LAB
SARS-COV-2 RNA SPEC QL NAA+PROBE: NOT DETECTED
SPECIMEN SOURCE: NORMAL

## 2020-10-12 ENCOUNTER — ANESTHESIA (OUTPATIENT)
Dept: SURGERY | Facility: OTHER | Age: 45
End: 2020-10-12
Payer: COMMERCIAL

## 2020-10-12 ENCOUNTER — HOSPITAL ENCOUNTER (INPATIENT)
Facility: OTHER | Age: 45
LOS: 2 days | Discharge: HOME | End: 2020-10-14
Attending: SURGERY | Admitting: SURGERY
Payer: COMMERCIAL

## 2020-10-12 DIAGNOSIS — K63.5 POLYP OF ASCENDING COLON, UNSPECIFIED TYPE: ICD-10-CM

## 2020-10-12 LAB
CREAT SERPL-MCNC: 0.74 MG/DL (ref 0.6–1.2)
GFR SERPL CREATININE-BSD FRML MDRD: 84 ML/MIN/{1.73_M2}

## 2020-10-12 PROCEDURE — 360N000026 HC SURGERY LEVEL 4 1ST 30 MIN: Performed by: SURGERY

## 2020-10-12 PROCEDURE — 250N000009 HC RX 250: Performed by: SURGERY

## 2020-10-12 PROCEDURE — 44204 LAPARO PARTIAL COLECTOMY: CPT | Performed by: NURSE ANESTHETIST, CERTIFIED REGISTERED

## 2020-10-12 PROCEDURE — 272N000001 HC OR GENERAL SUPPLY STERILE: Performed by: SURGERY

## 2020-10-12 PROCEDURE — 761N000003 HC RECOVERY PHASE 1 LEVEL 2 FIRST HR: Performed by: SURGERY

## 2020-10-12 PROCEDURE — 88309 TISSUE EXAM BY PATHOLOGIST: CPT

## 2020-10-12 PROCEDURE — 250N000011 HC RX IP 250 OP 636: Performed by: NURSE ANESTHETIST, CERTIFIED REGISTERED

## 2020-10-12 PROCEDURE — 250N000013 HC RX MED GY IP 250 OP 250 PS 637: Performed by: SURGERY

## 2020-10-12 PROCEDURE — 370N000001 HC ANESTHESIA TECHNICAL FEE, 1ST 30 MIN: Performed by: SURGERY

## 2020-10-12 PROCEDURE — C9113 INJ PANTOPRAZOLE SODIUM, VIA: HCPCS | Performed by: SURGERY

## 2020-10-12 PROCEDURE — 36415 COLL VENOUS BLD VENIPUNCTURE: CPT | Performed by: SURGERY

## 2020-10-12 PROCEDURE — 44204 LAPARO PARTIAL COLECTOMY: CPT | Performed by: SURGERY

## 2020-10-12 PROCEDURE — 250N000001 HC DESFLURANE, EA 15 MIN: Performed by: SURGERY

## 2020-10-12 PROCEDURE — 82565 ASSAY OF CREATININE: CPT | Performed by: SURGERY

## 2020-10-12 PROCEDURE — 360N000027 HC SURGERY LEVEL 4 EA 15 ADDTL MIN: Performed by: SURGERY

## 2020-10-12 PROCEDURE — 258N000001 HC RX 258: Performed by: SURGERY

## 2020-10-12 PROCEDURE — 120N000001 HC R&B MED SURG/OB

## 2020-10-12 PROCEDURE — 999N000136 HC STATISTIC PRE PROC ASSESS II: Performed by: SURGERY

## 2020-10-12 PROCEDURE — 370N000002 HC ANESTHESIA TECHNICAL FEE, EACH ADDTL 15 MIN: Performed by: SURGERY

## 2020-10-12 PROCEDURE — 258N000003 HC RX IP 258 OP 636: Performed by: NURSE ANESTHETIST, CERTIFIED REGISTERED

## 2020-10-12 PROCEDURE — 258N000003 HC RX IP 258 OP 636: Performed by: SURGERY

## 2020-10-12 PROCEDURE — 250N000009 HC RX 250: Performed by: NURSE ANESTHETIST, CERTIFIED REGISTERED

## 2020-10-12 PROCEDURE — 250N000011 HC RX IP 250 OP 636: Performed by: SURGERY

## 2020-10-12 PROCEDURE — 0DTF4ZZ RESECTION OF RIGHT LARGE INTESTINE, PERCUTANEOUS ENDOSCOPIC APPROACH: ICD-10-PCS | Performed by: SURGERY

## 2020-10-12 RX ORDER — ONDANSETRON 2 MG/ML
4 INJECTION INTRAMUSCULAR; INTRAVENOUS EVERY 30 MIN PRN
Status: DISCONTINUED | OUTPATIENT
Start: 2020-10-12 | End: 2020-10-12 | Stop reason: HOSPADM

## 2020-10-12 RX ORDER — MORPHINE SULFATE 0.5 MG/ML
INJECTION, SOLUTION EPIDURAL; INTRATHECAL; INTRAVENOUS PRN
Status: DISCONTINUED | OUTPATIENT
Start: 2020-10-12 | End: 2020-10-12

## 2020-10-12 RX ORDER — CEFOTETAN AND DEXTROSE 1 G/50ML
1 INJECTION, SOLUTION INTRAVENOUS EVERY 12 HOURS
Status: COMPLETED | OUTPATIENT
Start: 2020-10-13 | End: 2020-10-13

## 2020-10-12 RX ORDER — PROPOFOL 10 MG/ML
INJECTION, EMULSION INTRAVENOUS CONTINUOUS PRN
Status: DISCONTINUED | OUTPATIENT
Start: 2020-10-12 | End: 2020-10-12

## 2020-10-12 RX ORDER — ACETAMINOPHEN 10 MG/ML
1000 INJECTION, SOLUTION INTRAVENOUS EVERY 6 HOURS
Status: DISCONTINUED | OUTPATIENT
Start: 2020-10-12 | End: 2020-10-13 | Stop reason: CLARIF

## 2020-10-12 RX ORDER — CEFOTETAN AND DEXTROSE 1 G/50ML
1 INJECTION, SOLUTION INTRAVENOUS SEE ADMIN INSTRUCTIONS
Status: DISCONTINUED | OUTPATIENT
Start: 2020-10-12 | End: 2020-10-12 | Stop reason: HOSPADM

## 2020-10-12 RX ORDER — TRAZODONE HYDROCHLORIDE 50 MG/1
100-200 TABLET, FILM COATED ORAL
Status: DISCONTINUED | OUTPATIENT
Start: 2020-10-12 | End: 2020-10-14 | Stop reason: HOSPADM

## 2020-10-12 RX ORDER — NALOXONE HYDROCHLORIDE 0.4 MG/ML
.1-.4 INJECTION, SOLUTION INTRAMUSCULAR; INTRAVENOUS; SUBCUTANEOUS
Status: DISCONTINUED | OUTPATIENT
Start: 2020-10-12 | End: 2020-10-12 | Stop reason: HOSPADM

## 2020-10-12 RX ORDER — ONDANSETRON 2 MG/ML
4 INJECTION INTRAMUSCULAR; INTRAVENOUS EVERY 6 HOURS PRN
Status: DISCONTINUED | OUTPATIENT
Start: 2020-10-12 | End: 2020-10-14 | Stop reason: HOSPADM

## 2020-10-12 RX ORDER — OXYCODONE AND ACETAMINOPHEN 5; 325 MG/1; MG/1
1 TABLET ORAL EVERY 4 HOURS PRN
Status: DISCONTINUED | OUTPATIENT
Start: 2020-10-12 | End: 2020-10-14 | Stop reason: HOSPADM

## 2020-10-12 RX ORDER — TRAZODONE HYDROCHLORIDE 50 MG/1
200 TABLET, FILM COATED ORAL
Status: DISCONTINUED | OUTPATIENT
Start: 2020-10-12 | End: 2020-10-12

## 2020-10-12 RX ORDER — HYDROMORPHONE HYDROCHLORIDE 1 MG/ML
.3-.5 INJECTION, SOLUTION INTRAMUSCULAR; INTRAVENOUS; SUBCUTANEOUS
Status: DISCONTINUED | OUTPATIENT
Start: 2020-10-12 | End: 2020-10-14 | Stop reason: HOSPADM

## 2020-10-12 RX ORDER — ALVIMOPAN 12 MG/1
12 CAPSULE ORAL ONCE
Status: COMPLETED | OUTPATIENT
Start: 2020-10-12 | End: 2020-10-12

## 2020-10-12 RX ORDER — LIDOCAINE 40 MG/G
CREAM TOPICAL
Status: DISCONTINUED | OUTPATIENT
Start: 2020-10-12 | End: 2020-10-14 | Stop reason: HOSPADM

## 2020-10-12 RX ORDER — KETOROLAC TROMETHAMINE 30 MG/ML
30 INJECTION, SOLUTION INTRAMUSCULAR; INTRAVENOUS EVERY 6 HOURS PRN
Status: DISCONTINUED | OUTPATIENT
Start: 2020-10-12 | End: 2020-10-14 | Stop reason: HOSPADM

## 2020-10-12 RX ORDER — SODIUM CHLORIDE, SODIUM LACTATE, POTASSIUM CHLORIDE, CALCIUM CHLORIDE 600; 310; 30; 20 MG/100ML; MG/100ML; MG/100ML; MG/100ML
INJECTION, SOLUTION INTRAVENOUS CONTINUOUS
Status: DISCONTINUED | OUTPATIENT
Start: 2020-10-12 | End: 2020-10-12 | Stop reason: HOSPADM

## 2020-10-12 RX ORDER — NALOXONE HYDROCHLORIDE 0.4 MG/ML
.1-.4 INJECTION, SOLUTION INTRAMUSCULAR; INTRAVENOUS; SUBCUTANEOUS
Status: DISCONTINUED | OUTPATIENT
Start: 2020-10-12 | End: 2020-10-14 | Stop reason: HOSPADM

## 2020-10-12 RX ORDER — DEXAMETHASONE SODIUM PHOSPHATE 4 MG/ML
INJECTION, SOLUTION INTRA-ARTICULAR; INTRALESIONAL; INTRAMUSCULAR; INTRAVENOUS; SOFT TISSUE PRN
Status: DISCONTINUED | OUTPATIENT
Start: 2020-10-12 | End: 2020-10-12

## 2020-10-12 RX ORDER — LIDOCAINE HYDROCHLORIDE 20 MG/ML
INJECTION, SOLUTION INFILTRATION; PERINEURAL PRN
Status: DISCONTINUED | OUTPATIENT
Start: 2020-10-12 | End: 2020-10-12

## 2020-10-12 RX ORDER — ACETAMINOPHEN 325 MG/1
975 TABLET ORAL ONCE
Status: COMPLETED | OUTPATIENT
Start: 2020-10-12 | End: 2020-10-12

## 2020-10-12 RX ORDER — ONDANSETRON 2 MG/ML
INJECTION INTRAMUSCULAR; INTRAVENOUS PRN
Status: DISCONTINUED | OUTPATIENT
Start: 2020-10-12 | End: 2020-10-12

## 2020-10-12 RX ORDER — KETOROLAC TROMETHAMINE 30 MG/ML
30 INJECTION, SOLUTION INTRAMUSCULAR; INTRAVENOUS ONCE
Status: COMPLETED | OUTPATIENT
Start: 2020-10-12 | End: 2020-10-12

## 2020-10-12 RX ORDER — VECURONIUM BROMIDE 1 MG/ML
INJECTION, POWDER, LYOPHILIZED, FOR SOLUTION INTRAVENOUS PRN
Status: DISCONTINUED | OUTPATIENT
Start: 2020-10-12 | End: 2020-10-12

## 2020-10-12 RX ORDER — MEPERIDINE HYDROCHLORIDE 50 MG/ML
12.5 INJECTION INTRAMUSCULAR; INTRAVENOUS; SUBCUTANEOUS
Status: DISCONTINUED | OUTPATIENT
Start: 2020-10-12 | End: 2020-10-12 | Stop reason: HOSPADM

## 2020-10-12 RX ORDER — BUPIVACAINE HYDROCHLORIDE 7.5 MG/ML
INJECTION, SOLUTION INTRASPINAL PRN
Status: DISCONTINUED | OUTPATIENT
Start: 2020-10-12 | End: 2020-10-12

## 2020-10-12 RX ORDER — PROPOFOL 10 MG/ML
INJECTION, EMULSION INTRAVENOUS PRN
Status: DISCONTINUED | OUTPATIENT
Start: 2020-10-12 | End: 2020-10-12

## 2020-10-12 RX ORDER — ESMOLOL HYDROCHLORIDE 10 MG/ML
INJECTION INTRAVENOUS PRN
Status: DISCONTINUED | OUTPATIENT
Start: 2020-10-12 | End: 2020-10-12

## 2020-10-12 RX ORDER — NALOXONE HYDROCHLORIDE 0.4 MG/ML
.1-.4 INJECTION, SOLUTION INTRAMUSCULAR; INTRAVENOUS; SUBCUTANEOUS
Status: DISCONTINUED | OUTPATIENT
Start: 2020-10-12 | End: 2020-10-12

## 2020-10-12 RX ORDER — CEFOTETAN AND DEXTROSE 1 G/50ML
1 INJECTION, SOLUTION INTRAVENOUS
Status: COMPLETED | OUTPATIENT
Start: 2020-10-12 | End: 2020-10-12

## 2020-10-12 RX ORDER — CHOLECALCIFEROL (VITAMIN D3) 125 MCG
10000 CAPSULE ORAL AT BEDTIME
Status: ON HOLD | COMMUNITY
End: 2020-10-13

## 2020-10-12 RX ORDER — ALVIMOPAN 12 MG/1
12 CAPSULE ORAL 2 TIMES DAILY
Status: DISCONTINUED | OUTPATIENT
Start: 2020-10-13 | End: 2020-10-13

## 2020-10-12 RX ORDER — ONDANSETRON 4 MG/1
4 TABLET, ORALLY DISINTEGRATING ORAL EVERY 30 MIN PRN
Status: DISCONTINUED | OUTPATIENT
Start: 2020-10-12 | End: 2020-10-12 | Stop reason: HOSPADM

## 2020-10-12 RX ORDER — KETAMINE HYDROCHLORIDE 10 MG/ML
INJECTION INTRAMUSCULAR; INTRAVENOUS PRN
Status: DISCONTINUED | OUTPATIENT
Start: 2020-10-12 | End: 2020-10-12

## 2020-10-12 RX ORDER — SODIUM CHLORIDE, SODIUM LACTATE, POTASSIUM CHLORIDE, CALCIUM CHLORIDE 600; 310; 30; 20 MG/100ML; MG/100ML; MG/100ML; MG/100ML
INJECTION, SOLUTION INTRAVENOUS CONTINUOUS
Status: DISCONTINUED | OUTPATIENT
Start: 2020-10-12 | End: 2020-10-13

## 2020-10-12 RX ORDER — FENTANYL CITRATE 50 UG/ML
INJECTION, SOLUTION INTRAMUSCULAR; INTRAVENOUS PRN
Status: DISCONTINUED | OUTPATIENT
Start: 2020-10-12 | End: 2020-10-12

## 2020-10-12 RX ORDER — LIDOCAINE HYDROCHLORIDE 10 MG/ML
INJECTION, SOLUTION EPIDURAL; INFILTRATION; INTRACAUDAL; PERINEURAL PRN
Status: DISCONTINUED | OUTPATIENT
Start: 2020-10-12 | End: 2020-10-12

## 2020-10-12 RX ORDER — LIDOCAINE 40 MG/G
CREAM TOPICAL
Status: DISCONTINUED | OUTPATIENT
Start: 2020-10-12 | End: 2020-10-12 | Stop reason: HOSPADM

## 2020-10-12 RX ORDER — HYDROMORPHONE HYDROCHLORIDE 1 MG/ML
.3-.5 INJECTION, SOLUTION INTRAMUSCULAR; INTRAVENOUS; SUBCUTANEOUS EVERY 10 MIN PRN
Status: DISCONTINUED | OUTPATIENT
Start: 2020-10-12 | End: 2020-10-12 | Stop reason: HOSPADM

## 2020-10-12 RX ORDER — DIPHENHYDRAMINE HYDROCHLORIDE 50 MG/ML
25 INJECTION INTRAMUSCULAR; INTRAVENOUS EVERY 4 HOURS PRN
Status: DISCONTINUED | OUTPATIENT
Start: 2020-10-12 | End: 2020-10-14 | Stop reason: HOSPADM

## 2020-10-12 RX ORDER — FENTANYL CITRATE 50 UG/ML
25-50 INJECTION, SOLUTION INTRAMUSCULAR; INTRAVENOUS EVERY 5 MIN PRN
Status: DISCONTINUED | OUTPATIENT
Start: 2020-10-12 | End: 2020-10-12 | Stop reason: HOSPADM

## 2020-10-12 RX ORDER — KETOROLAC TROMETHAMINE 30 MG/ML
30 INJECTION, SOLUTION INTRAMUSCULAR; INTRAVENOUS EVERY 6 HOURS PRN
Status: DISCONTINUED | OUTPATIENT
Start: 2020-10-12 | End: 2020-10-12

## 2020-10-12 RX ORDER — GLYCOPYRROLATE 0.2 MG/ML
INJECTION, SOLUTION INTRAMUSCULAR; INTRAVENOUS PRN
Status: DISCONTINUED | OUTPATIENT
Start: 2020-10-12 | End: 2020-10-12

## 2020-10-12 RX ORDER — NEOSTIGMINE METHYLSULFATE 1 MG/ML
VIAL (ML) INJECTION PRN
Status: DISCONTINUED | OUTPATIENT
Start: 2020-10-12 | End: 2020-10-12

## 2020-10-12 RX ORDER — BUPIVACAINE HYDROCHLORIDE AND EPINEPHRINE 5; 5 MG/ML; UG/ML
INJECTION, SOLUTION PERINEURAL PRN
Status: DISCONTINUED | OUTPATIENT
Start: 2020-10-12 | End: 2020-10-12 | Stop reason: HOSPADM

## 2020-10-12 RX ADMIN — HYDROMORPHONE HYDROCHLORIDE 1 MG: 1 INJECTION, SOLUTION INTRAMUSCULAR; INTRAVENOUS; SUBCUTANEOUS at 12:34

## 2020-10-12 RX ADMIN — KETOROLAC TROMETHAMINE 30 MG: 30 INJECTION, SOLUTION INTRAMUSCULAR at 13:58

## 2020-10-12 RX ADMIN — Medication 30 MG: at 12:17

## 2020-10-12 RX ADMIN — OXYCODONE HYDROCHLORIDE AND ACETAMINOPHEN 1 TABLET: 5; 325 TABLET ORAL at 20:58

## 2020-10-12 RX ADMIN — SODIUM CHLORIDE, POTASSIUM CHLORIDE, SODIUM LACTATE AND CALCIUM CHLORIDE: 600; 310; 30; 20 INJECTION, SOLUTION INTRAVENOUS at 13:58

## 2020-10-12 RX ADMIN — FENTANYL CITRATE 50 MCG: 50 INJECTION, SOLUTION INTRAMUSCULAR; INTRAVENOUS at 14:32

## 2020-10-12 RX ADMIN — PANTOPRAZOLE SODIUM 40 MG: 40 INJECTION, POWDER, FOR SOLUTION INTRAVENOUS at 16:04

## 2020-10-12 RX ADMIN — LIDOCAINE HYDROCHLORIDE 1 ML: 10 INJECTION, SOLUTION EPIDURAL; INFILTRATION; INTRACAUDAL; PERINEURAL at 11:46

## 2020-10-12 RX ADMIN — ACETAMINOPHEN 1000 MG: 10 INJECTION, SOLUTION INTRAVENOUS at 16:03

## 2020-10-12 RX ADMIN — VECURONIUM BROMIDE 1 MG: 1 INJECTION, POWDER, LYOPHILIZED, FOR SOLUTION INTRAVENOUS at 13:04

## 2020-10-12 RX ADMIN — MIDAZOLAM 1 MG: 1 INJECTION INTRAMUSCULAR; INTRAVENOUS at 12:18

## 2020-10-12 RX ADMIN — FENTANYL CITRATE 50 MCG: 50 INJECTION, SOLUTION INTRAMUSCULAR; INTRAVENOUS at 14:16

## 2020-10-12 RX ADMIN — DIPHENHYDRAMINE HYDROCHLORIDE 25 MG: 50 INJECTION, SOLUTION INTRAMUSCULAR; INTRAVENOUS at 22:34

## 2020-10-12 RX ADMIN — ACETAMINOPHEN 975 MG: 325 TABLET, FILM COATED ORAL at 09:15

## 2020-10-12 RX ADMIN — CEFOTETAN AND DEXTROSE 1 G: 1 INJECTION, SOLUTION INTRAVENOUS at 12:01

## 2020-10-12 RX ADMIN — HYDROMORPHONE HYDROCHLORIDE 0.5 MG: 1 INJECTION, SOLUTION INTRAMUSCULAR; INTRAVENOUS; SUBCUTANEOUS at 14:09

## 2020-10-12 RX ADMIN — ALVIMOPAN 12 MG: 12 CAPSULE ORAL at 09:46

## 2020-10-12 RX ADMIN — DEXAMETHASONE SODIUM PHOSPHATE 8 MG: 4 INJECTION, SOLUTION INTRA-ARTICULAR; INTRALESIONAL; INTRAMUSCULAR; INTRAVENOUS; SOFT TISSUE at 12:15

## 2020-10-12 RX ADMIN — GLYCOPYRROLATE 0.6 MG: 0.2 INJECTION, SOLUTION INTRAMUSCULAR; INTRAVENOUS at 13:29

## 2020-10-12 RX ADMIN — MIDAZOLAM 2 MG: 1 INJECTION INTRAMUSCULAR; INTRAVENOUS at 11:11

## 2020-10-12 RX ADMIN — KETOROLAC TROMETHAMINE 30 MG: 30 INJECTION, SOLUTION INTRAMUSCULAR at 19:39

## 2020-10-12 RX ADMIN — ROCURONIUM BROMIDE 40 MG: 10 INJECTION INTRAVENOUS at 11:54

## 2020-10-12 RX ADMIN — HYDROMORPHONE HYDROCHLORIDE 0.5 MG: 1 INJECTION, SOLUTION INTRAMUSCULAR; INTRAVENOUS; SUBCUTANEOUS at 14:02

## 2020-10-12 RX ADMIN — ESMOLOL HYDROCHLORIDE 20 MG: 10 INJECTION, SOLUTION INTRAVENOUS at 12:41

## 2020-10-12 RX ADMIN — PROPOFOL 200 MCG/KG/MIN: 10 INJECTION, EMULSION INTRAVENOUS at 11:55

## 2020-10-12 RX ADMIN — MORPHINE SULFATE 0.1 MG: 0.5 INJECTION, SOLUTION EPIDURAL; INTRATHECAL; INTRAVENOUS at 11:48

## 2020-10-12 RX ADMIN — LIDOCAINE HYDROCHLORIDE 60 MG: 20 INJECTION, SOLUTION INFILTRATION; PERINEURAL at 11:54

## 2020-10-12 RX ADMIN — ACETAMINOPHEN 1000 MG: 10 INJECTION, SOLUTION INTRAVENOUS at 22:39

## 2020-10-12 RX ADMIN — FENTANYL CITRATE 50 MCG: 50 INJECTION, SOLUTION INTRAMUSCULAR; INTRAVENOUS at 14:22

## 2020-10-12 RX ADMIN — SODIUM CHLORIDE, POTASSIUM CHLORIDE, SODIUM LACTATE AND CALCIUM CHLORIDE: 600; 310; 30; 20 INJECTION, SOLUTION INTRAVENOUS at 09:05

## 2020-10-12 RX ADMIN — SODIUM CHLORIDE, POTASSIUM CHLORIDE, SODIUM LACTATE AND CALCIUM CHLORIDE: 600; 310; 30; 20 INJECTION, SOLUTION INTRAVENOUS at 18:49

## 2020-10-12 RX ADMIN — ROCURONIUM BROMIDE 10 MG: 10 INJECTION INTRAVENOUS at 12:37

## 2020-10-12 RX ADMIN — DIPHENHYDRAMINE HYDROCHLORIDE 25 MG: 50 INJECTION, SOLUTION INTRAMUSCULAR; INTRAVENOUS at 17:53

## 2020-10-12 RX ADMIN — FENTANYL CITRATE 25 MCG: 50 INJECTION, SOLUTION INTRAMUSCULAR; INTRAVENOUS at 11:45

## 2020-10-12 RX ADMIN — NEOSTIGMINE METHYLSULFATE 4 MG: 1 INJECTION INTRAVENOUS at 13:30

## 2020-10-12 RX ADMIN — PROPOFOL 200 MG: 10 INJECTION, EMULSION INTRAVENOUS at 11:54

## 2020-10-12 RX ADMIN — MIDAZOLAM 1 MG: 1 INJECTION INTRAMUSCULAR; INTRAVENOUS at 11:42

## 2020-10-12 RX ADMIN — Medication 20 MG: at 12:55

## 2020-10-12 RX ADMIN — ONDANSETRON 4 MG: 2 INJECTION INTRAMUSCULAR; INTRAVENOUS at 11:54

## 2020-10-12 RX ADMIN — HYDROMORPHONE HYDROCHLORIDE 0.3 MG: 1 INJECTION, SOLUTION INTRAMUSCULAR; INTRAVENOUS; SUBCUTANEOUS at 17:53

## 2020-10-12 RX ADMIN — FENTANYL CITRATE 75 MCG: 50 INJECTION, SOLUTION INTRAMUSCULAR; INTRAVENOUS at 12:18

## 2020-10-12 RX ADMIN — BUPIVACAINE HYDROCHLORIDE IN DEXTROSE 0.8 ML: 7.5 INJECTION, SOLUTION SUBARACHNOID at 11:48

## 2020-10-12 RX ADMIN — ESMOLOL HYDROCHLORIDE 20 MG: 10 INJECTION, SOLUTION INTRAVENOUS at 12:56

## 2020-10-12 RX ADMIN — HYDROMORPHONE HYDROCHLORIDE 0.5 MG: 1 INJECTION, SOLUTION INTRAMUSCULAR; INTRAVENOUS; SUBCUTANEOUS at 22:37

## 2020-10-12 SDOH — HEALTH STABILITY: MENTAL HEALTH: CURRENT SMOKER: 1

## 2020-10-12 ASSESSMENT — ACTIVITIES OF DAILY LIVING (ADL)
ADLS_ACUITY_SCORE: 17
ADLS_ACUITY_SCORE: 15

## 2020-10-12 ASSESSMENT — LIFESTYLE VARIABLES: TOBACCO_USE: 1

## 2020-10-12 NOTE — ANESTHESIA PROCEDURE NOTES
Airway   Date/Time: 10/12/2020 11:57 AM   Patient location during procedure: OR    Staff -   CRNA: Edward Terry APRN CRNA  Performed By: CRNA    Indications and Patient Condition  Indications for airway management: pratik-procedural  Induction type:intravenousMask difficulty assessment: 1 - vent by mask    Final Airway Details  Final airway type: endotracheal airway  Successful airway:ETT - single  Endotracheal Airway Details   ETT size (mm): 7.0  Cuffed: yes  Successful intubation technique: direct laryngoscopy  Grade View of Cords: 1  Adjucts: stylet  Measured from: gums/teeth  Secured at (cm): 22  Secured with: silk tape  Bite block used: None    Post intubation assessment   Placement verified by: capnometry, equal breath sounds and chest rise   Number of attempts at approach: 1  Secured with:silk tape  Ease of procedure: easy  Dentition: Intact

## 2020-10-12 NOTE — OP NOTE
Preoperative Diagnosis:  Large unresectable polyp of the cecum    Postoperative Diagnosis:  same    Planned Procedure:  Laparoscopic right colectomy    Procedure Performed:  Laparoscopic  Right colectomy with anastomosis    Surgeon:  Chucho Mercado MD    Circulator: Emily Montes RN  Relief Circulator: Luz Contreras RN  Scrub Person: Blanquita Trinidad Ann  First Assistant: Sumeet Centeno RN; Margot Mercado RN  Pre-Op Nurse: Leydi Montero RN    Anesthesia:  General endotracheal     Estimated Blood Loss:  50 ml      INDICATIONS  Please see the consultation/H & P.  Colonoscopy showed a large polyp in the cecum. Biopsy confirmed sessile serrated pathology. I explained the risks, benefits and alternatives to laparoscopic assisted removal of the cecum to remove the large polyp.  I specifically explained the risks bleeding, infection, possible ostomy, possible anastomosis breakdown, possible abscess development requiring subsequent drainage, or other procedures. The patient expressedunderstanding and wishes to proceed. Informed consent paperwork was completed.    DESCRIPTION OF PROCEDURE  The patient was brought to the operating room and placed in a supine position on the operating table.Appropriate monitors were placed.  After general anesthesia was induced, the patient was positioned, prepped and draped in the usual sterile fashion.Timeout was performed confirming the patient's identity and procedure to beperformed.  Local anesthetic was infiltrated in the skin and subcutaneous tissue just the umbilicus. A 12 mm incision was made. The Veress needle was inserted into the peritoneal cavity and placement confirmed using saline test.  CO2 was then used to establish pneumoperitoneum.  Trocar was inserted without difficulty.  The camera was inserted, and the contents of the abdomen wereinspected. No evidence of injury was noted on inspection. No gross abnormalities were noted. Local anesthetic was  infiltrated in the lower midline right epigastric abdomen and the LUQ and LLQ abdomen.  Skin incisions were made and under direct visualizationtrocars were positioned.   The right colon was elevated and the lateral attachments taken down.  The ileum and hepatic flexure were transected.  The mesentery was taken down with ligasure.     An umbilical incision was made incorporating the port site. Dissection was carried down through subcutaneous tissues using electrocautery to maintain excellent hemostasis. The fascia was opened, and the peritoneum was entered. The fascia was opened to the length of the incision. The specimen was then passed off the table for pathology. Colotomies were created.  The mesentery was closed with interrupted 3-0 Vicryl sutureA side to side functional end to end anastomosis was created with a 60 linear cutting stapler.  The peritoneal cavity was then irrigated with warm sterile water. The irrigation was suctioned and noted to return clear. Hemostasis was noted to be appropriate. The omentum was returned over the anastomosis which was again checked. It was widely patent, and there was no sign of tension or leak.  The anastomosis was returned back to the peritoneal cavity. No other masses or signs of metastasis were grossly identified within the peritoneal cavity. The liver appeared normal.  Hemostasis again was again assured.    The fascial edges at the midline incision were then approximated with a running looped PDS suture. The soft tissues were irrigated with saline, and hemostasis was assured.  Skin edges were approximated absorbable staples at each incision. The wounds were cleaned, and steri-strips were applied. The wounds were dressed. The patient was taken in a stable condition to the postanesthesia care unit. Sponge, needle, and instrument counts were correct at the end of the case. There were no immediately apparent complications.

## 2020-10-12 NOTE — ANESTHESIA PROCEDURE NOTES
Procedure note : intrathecal      Staff -   CRNA: Edward Terry APRN CRNA  Performed By: CRNA  Pre-Procedure    Location: OR    Procedure Times:10/12/2020 11:42 AM and 10/12/2020 11:48 AM  Pre-Anesthestic Checklist: patient identified, IV checked, site marked, risks and benefits discussed, informed consent, monitors and equipment checked, pre-op evaluation and at physician/surgeon's request    Timeout  Correct Patient: Yes   Correct Procedure: Yes   Correct Site: Yes     Correct Position: Yes     .   Procedure Documentation  ASA 2  .    Procedure: intrathecal, .   Patient Position:sitting Insertion Site:L3-4  (midline approach)     Patient Prep/Sterile Barriers; mask, sterile gloves, chlorhexidine gluconate and isopropyl alcohol, patient draped.  .  Needle:  Spinal Needle (gauge): 27  Spinal/LP Needle Length (inches): 3.5 # of attempts: 1 and # of redirects:  Introducer used Introducer: 20 G .        Assessment/Narrative  Paresthesias: Yes.  .  .  clear CSF fluid removed .

## 2020-10-12 NOTE — OR NURSING
PACU Transfer Note    Radha Ruiz was transferred to [place] [id number] via cart.  Equipment used for transport:  [employer].  Accompanied by:  RN  Prescriptions were: none    PACU Respiratory Event Documentation     1) Episodes of Apnea greater than or equal to 10 seconds: no    2) Bradypnea - less than 8 breaths per minute: no    3) Pain score on 0 to 10 scale: 4/10 patient states this is tolerable    4) Pain-sedation mismatch (yes or no): no    5) Repeated 02 desaturation less than 90% (yes or no): no    Anesthesia notified? (yes or no): no    Any of the above events occuring repeatedly in separate 30 minute intervals may be considered recurrent PACU respiratory events.    Patient stable and meets phase 1 discharge criteria for transport from PACU.    Jing Guevara RN

## 2020-10-12 NOTE — PLAN OF CARE
Patient admitted for polyp of ascending colon. VSS and WNL. Stable now on room air. Patient states her pain is 3 out of 10, controlled with meds from PACU. PRN meds offered, scheduled tylenol given. Ice applied to abdomen. Patient tolerating clear liquid diet, denies n/v. Saenz catheter patent with adequate output. SCDs in place. Will continue to monitor.

## 2020-10-12 NOTE — PROGRESS NOTES
Admission Note    Data:  Radha Ruiz admitted to 311 from surgery at 1450.      Action:  Dr. Mercado has been notified of admission. Pt oriented to unit, call light in reach.     Response:  Patient tolerated transfer well .

## 2020-10-12 NOTE — PHARMACY-ADMISSION MEDICATION HISTORY
"Pharmacy -- Admission Medication Reconciliation    Prior to admission (PTA) medications were reviewed and the patient's PTA medication list was updated.    Sources Consulted: patient phone interview, sure scripts, chart review    The reliability of this Medication Reconciliation is: Reliability: Reliable    The following significant changes were made:    Added vitamin d daily--patient states she is \"stocking up for winter\" and taking 10,000 units a day    Added discharge pharmacy      **patient says she is trying to decrease her use of trazodone, is currently taking 2 tablets at bedtime    In addition, the patient's allergies were reviewed with the patient and updated as follows:   Allergies: Sulfa drugs    The pharmacist has reviewed with the patient that all personal medications should be removed from the building or locked in the belongings safe.  Patient shall only take medications ordered by the physician and administered by the nursing staff.       Medication barriers identified: none   Medication adherence concerns: none   Understanding of emergency medications: BECKY Wilder RPH, 10/12/2020,  3:17 PM     "

## 2020-10-12 NOTE — INTERVAL H&P NOTE
I saw and examined Radha Ruiz.  I have reviewed the history and physical and find no changes to the patient's medical status or condition with the exceptions noted below.     Chucho Mercado MD   9:29 AM 10/12/2020

## 2020-10-12 NOTE — ANESTHESIA POSTPROCEDURE EVALUATION
Patient: Radha Ruiz    Procedure(s):  Laparoscopic Right Colectomy    Diagnosis:Polyp of ascending colon, unspecified type [K63.5]  Diagnosis Additional Information: No value filed.    Anesthesia Type:  General, Spinal    Note:  Anesthesia Post Evaluation    Patient location during evaluation: PACU  Patient participation: Able to fully participate in evaluation  Level of consciousness: awake and alert  Pain management: adequate  Airway patency: patent  Cardiovascular status: acceptable  Respiratory status: acceptable  Hydration status: acceptable  PONV: none     Anesthetic complications: None          Last vitals:  Vitals:    10/12/20 1515 10/12/20 1530 10/12/20 1600   BP: 114/41 118/56 125/73   Pulse: 74 97 67   Resp: 16 16 16   Temp: 98.1  F (36.7  C) 96.8  F (36  C) 98.2  F (36.8  C)   SpO2: 99% 100% 99%         Electronically Signed By: JOSE REHMAN CRNA  October 12, 2020  4:17 PM

## 2020-10-12 NOTE — ANESTHESIA PREPROCEDURE EVALUATION
Anesthesia Pre-Procedure Evaluation    Patient: Radha Ruiz   MRN: [id number] : 1975          Preoperative Diagnosis: Polyp of ascending colon, unspecified type [K63.5]    Procedure(s):  Laparoscopic Right Colectomy    Past Medical History:   Diagnosis Date     Encounter for gynecological examination without abnormal finding     08,Satisfactory GYN examination     Other pulmonary embolism without acute cor pulmonale (H)     History of pulmonary emboli after      Personal history of other medical treatment (CODE)      2, para 1-0-1-1     Personal history of other medical treatment (CODE)     10/00,History of blood transfusion with      Past Surgical History:   Procedure Laterality Date     ant/post colporr w enterocele incl cysturethroscopy  2017    Dr. Mc      SECTION      10/00 /06,History of blood transfusion with      COLONOSCOPY      ,,,F/U      COLONOSCOPY N/A 2020    Procedure: COLONOSCOPY, WITH POLYPECTOMY AND BIOPSY;  Surgeon: Rachel Acevedo MD;  Location: [facility] OR     Cryotherapy of Cervix       LAPAROSCOPIC ASSISTED HYSTERECTOMY VAGINAL, BILATERAL SALPINGO-OOPHORECTOMY, COMBINED      Ney Hart MD [facility]     LAPAROSCOPIC TUBAL LIGATION      2006       Anesthesia Evaluation     . Pt has had prior anesthetic.     No history of anesthetic complications          ROS/MED HX    ENT/Pulmonary:     (+)tobacco use, Current use 1 packs/day  , . .    Neurologic:  - neg neurologic ROS     Cardiovascular:  - neg cardiovascular ROS       METS/Exercise Tolerance:  >4 METS   Hematologic:  - neg hematologic  ROS       Musculoskeletal: Comment: Hx of lumbar fusion, hx of difficult spinal.  The patient would like to attempt ITN. - neg musculoskeletal ROS       GI/Hepatic:     (+) GERD       Renal/Genitourinary:  - ROS Renal section negative       Endo:  - neg endo ROS       Psychiatric:     (+) psychiatric history anxiety  "and depression      Infectious Disease:  - neg infectious disease ROS       Malignancy:      - no malignancy   Other:    - neg other ROS                      Physical Exam  Normal systems: cardiovascular, pulmonary and dental    Airway   Mallampati: II  TM distance: >3 FB  Neck ROM: full    Dental     Cardiovascular   Rhythm and rate: regular and normal      Pulmonary    breath sounds clear to auscultation            Lab Results   Component Value Date    WBC 6.8 08/03/2020    HGB 13.8 08/03/2020    HCT 40.9 08/03/2020     08/03/2020     08/03/2020    POTASSIUM 4.1 08/03/2020    CHLORIDE 103 08/03/2020    CO2 28 08/03/2020    BUN 6 (L) 08/03/2020    CR 0.94 08/03/2020    GLC 96 08/03/2020    MARTINEZ 9.8 08/03/2020    ALBUMIN 5.0 08/03/2020    PROTTOTAL 7.8 08/03/2020    ALT 25 08/03/2020    AST 30 08/03/2020    ALKPHOS 51 08/03/2020    BILITOTAL 0.3 08/03/2020    HCG Negative 10/17/2012       Preop Vitals  BP Readings from Last 3 Encounters:   10/12/20 110/74   10/02/20 110/80   09/16/20 121/77    Pulse Readings from Last 3 Encounters:   10/12/20 89   10/02/20 88   09/16/20 66      Resp Readings from Last 3 Encounters:   10/12/20 18   10/02/20 18   09/16/20 12    SpO2 Readings from Last 3 Encounters:   10/12/20 97%   10/02/20 100%   09/16/20 99%      Temp Readings from Last 1 Encounters:   10/12/20 97.4  F (36.3  C) (Tympanic)    Ht Readings from Last 1 Encounters:   10/02/20 1.54 m (5' 0.63\")      Wt Readings from Last 1 Encounters:   10/02/20 73 kg (161 lb)    Estimated body mass index is 30.79 kg/m  as calculated from the following:    Height as of 10/2/20: 1.54 m (5' 0.63\").    Weight as of 10/2/20: 73 kg (161 lb).       Anesthesia Plan      History & Physical Review      ASA Status:  2 .    NPO Status:  > 6 hours    Plan for General and Spinal (ITN for PPC) with Propofol induction. Maintenance will be Balanced.    PONV prophylaxis:  Ondansetron (or other 5HT-3) and Dexamethasone or Solumedrol    The " patient is a current Smoker     Postoperative Care  Postoperative pain management:  Neuraxial analgesia, Multi-modal analgesia and IV analgesics.      Consents  Anesthetic plan, risks, benefits and alternatives discussed with:  Patient..                 JOSE REHMAN CRNA

## 2020-10-12 NOTE — ANESTHESIA CARE TRANSFER NOTE
Patient: Radha Ruiz    Procedure(s):  Laparoscopic Right Colectomy    Diagnosis: Polyp of ascending colon, unspecified type [K63.5]  Diagnosis Additional Information: No value filed.    Anesthesia Type:   General, Spinal     Note:  Airway :Face Mask  Patient transferred to:PACU  Handoff Report: Identifed the Patient, Identified the Reponsible Provider, Reviewed the pertinent medical history, Discussed the surgical course, Reviewed Intra-OP anesthesia mangement and issues during anesthesia, Set expectations for post-procedure period and Allowed opportunity for questions and acknowledgement of understanding      Vitals: (Last set prior to Anesthesia Care Transfer)    CRNA VITALS  10/12/2020 1316 - 10/12/2020 1350      10/12/2020             Resp Rate (set):  10                Electronically Signed By: JOSE GARZON CRNA  October 12, 2020  1:50 PM

## 2020-10-13 PROCEDURE — 250N000011 HC RX IP 250 OP 636: Performed by: SURGERY

## 2020-10-13 PROCEDURE — 250N000013 HC RX MED GY IP 250 OP 250 PS 637: Performed by: INTERNAL MEDICINE

## 2020-10-13 PROCEDURE — 258N000003 HC RX IP 258 OP 636: Performed by: SURGERY

## 2020-10-13 PROCEDURE — 120N000001 HC R&B MED SURG/OB

## 2020-10-13 PROCEDURE — C9113 INJ PANTOPRAZOLE SODIUM, VIA: HCPCS | Performed by: SURGERY

## 2020-10-13 PROCEDURE — 250N000013 HC RX MED GY IP 250 OP 250 PS 637: Performed by: SURGERY

## 2020-10-13 PROCEDURE — 99207 PR NO CHARGE LOS: CPT | Performed by: SURGERY

## 2020-10-13 PROCEDURE — 250N000009 HC RX 250: Performed by: SURGERY

## 2020-10-13 RX ORDER — FAMOTIDINE 20 MG
10000 TABLET ORAL DAILY
Status: ON HOLD | COMMUNITY
End: 2023-10-26

## 2020-10-13 RX ORDER — CETIRIZINE HYDROCHLORIDE, PSEUDOEPHEDRINE HYDROCHLORIDE 5; 120 MG/1; MG/1
1 TABLET, FILM COATED, EXTENDED RELEASE ORAL 2 TIMES DAILY
Status: DISCONTINUED | OUTPATIENT
Start: 2020-10-13 | End: 2020-10-13 | Stop reason: CLARIF

## 2020-10-13 RX ORDER — LORATADINE 10 MG/1
10 TABLET ORAL DAILY
Status: DISCONTINUED | OUTPATIENT
Start: 2020-10-13 | End: 2020-10-14 | Stop reason: HOSPADM

## 2020-10-13 RX ORDER — VENLAFAXINE HYDROCHLORIDE 75 MG/1
225 CAPSULE, EXTENDED RELEASE ORAL
Status: DISCONTINUED | OUTPATIENT
Start: 2020-10-13 | End: 2020-10-14 | Stop reason: HOSPADM

## 2020-10-13 RX ORDER — PSEUDOEPHEDRINE HCL 30 MG
30 TABLET ORAL 4 TIMES DAILY
Status: DISCONTINUED | OUTPATIENT
Start: 2020-10-13 | End: 2020-10-14 | Stop reason: HOSPADM

## 2020-10-13 RX ADMIN — PSEUDOEPHEDRINE HCL 30 MG: 30 TABLET, FILM COATED ORAL at 12:29

## 2020-10-13 RX ADMIN — ACETAMINOPHEN 1000 MG: 10 INJECTION, SOLUTION INTRAVENOUS at 04:54

## 2020-10-13 RX ADMIN — PSEUDOEPHEDRINE HCL 30 MG: 30 TABLET, FILM COATED ORAL at 16:02

## 2020-10-13 RX ADMIN — HYDROMORPHONE HYDROCHLORIDE 0.5 MG: 1 INJECTION, SOLUTION INTRAMUSCULAR; INTRAVENOUS; SUBCUTANEOUS at 10:06

## 2020-10-13 RX ADMIN — ALVIMOPAN 12 MG: 12 CAPSULE ORAL at 09:40

## 2020-10-13 RX ADMIN — ENOXAPARIN SODIUM 40 MG: 40 INJECTION SUBCUTANEOUS at 07:59

## 2020-10-13 RX ADMIN — CEFOTETAN AND DEXTROSE 1 G: 1 INJECTION, SOLUTION INTRAVENOUS at 00:23

## 2020-10-13 RX ADMIN — LORATADINE 10 MG: 10 TABLET ORAL at 09:31

## 2020-10-13 RX ADMIN — KETOROLAC TROMETHAMINE 30 MG: 30 INJECTION, SOLUTION INTRAMUSCULAR at 19:49

## 2020-10-13 RX ADMIN — KETOROLAC TROMETHAMINE 30 MG: 30 INJECTION, SOLUTION INTRAMUSCULAR at 09:22

## 2020-10-13 RX ADMIN — OXYCODONE HYDROCHLORIDE AND ACETAMINOPHEN 1 TABLET: 5; 325 TABLET ORAL at 02:55

## 2020-10-13 RX ADMIN — OXYCODONE HYDROCHLORIDE AND ACETAMINOPHEN 1 TABLET: 5; 325 TABLET ORAL at 18:06

## 2020-10-13 RX ADMIN — HYDROMORPHONE HYDROCHLORIDE 0.5 MG: 1 INJECTION, SOLUTION INTRAMUSCULAR; INTRAVENOUS; SUBCUTANEOUS at 07:59

## 2020-10-13 RX ADMIN — OXYCODONE HYDROCHLORIDE AND ACETAMINOPHEN 1 TABLET: 5; 325 TABLET ORAL at 22:10

## 2020-10-13 RX ADMIN — PSEUDOEPHEDRINE HCL 30 MG: 30 TABLET, FILM COATED ORAL at 09:34

## 2020-10-13 RX ADMIN — HYDROMORPHONE HYDROCHLORIDE 0.5 MG: 1 INJECTION, SOLUTION INTRAMUSCULAR; INTRAVENOUS; SUBCUTANEOUS at 01:00

## 2020-10-13 RX ADMIN — VENLAFAXINE HYDROCHLORIDE 225 MG: 75 CAPSULE, EXTENDED RELEASE ORAL at 09:28

## 2020-10-13 RX ADMIN — TRAZODONE HYDROCHLORIDE 100 MG: 50 TABLET ORAL at 00:26

## 2020-10-13 RX ADMIN — HYDROMORPHONE HYDROCHLORIDE 0.5 MG: 1 INJECTION, SOLUTION INTRAMUSCULAR; INTRAVENOUS; SUBCUTANEOUS at 12:29

## 2020-10-13 RX ADMIN — CEFOTETAN AND DEXTROSE 1 G: 1 INJECTION, SOLUTION INTRAVENOUS at 12:29

## 2020-10-13 RX ADMIN — PSEUDOEPHEDRINE HCL 30 MG: 30 TABLET, FILM COATED ORAL at 22:10

## 2020-10-13 RX ADMIN — PANTOPRAZOLE SODIUM 40 MG: 40 INJECTION, POWDER, FOR SOLUTION INTRAVENOUS at 09:35

## 2020-10-13 RX ADMIN — SODIUM CHLORIDE, POTASSIUM CHLORIDE, SODIUM LACTATE AND CALCIUM CHLORIDE: 600; 310; 30; 20 INJECTION, SOLUTION INTRAVENOUS at 03:58

## 2020-10-13 RX ADMIN — ACETAMINOPHEN 1000 MG: 10 INJECTION, SOLUTION INTRAVENOUS at 09:42

## 2020-10-13 RX ADMIN — HYDROMORPHONE HYDROCHLORIDE 0.5 MG: 1 INJECTION, SOLUTION INTRAMUSCULAR; INTRAVENOUS; SUBCUTANEOUS at 16:02

## 2020-10-13 RX ADMIN — OXYCODONE HYDROCHLORIDE AND ACETAMINOPHEN 1 TABLET: 5; 325 TABLET ORAL at 14:19

## 2020-10-13 RX ADMIN — TRAZODONE HYDROCHLORIDE 200 MG: 50 TABLET ORAL at 22:10

## 2020-10-13 RX ADMIN — KETOROLAC TROMETHAMINE 30 MG: 30 INJECTION, SOLUTION INTRAMUSCULAR at 02:45

## 2020-10-13 ASSESSMENT — ACTIVITIES OF DAILY LIVING (ADL)
ADLS_ACUITY_SCORE: 18

## 2020-10-13 ASSESSMENT — MIFFLIN-ST. JEOR: SCORE: 1330.79

## 2020-10-13 NOTE — PROGRESS NOTES
PROGRESS NOTE    cc: POD# 1 s/p lap right colectomy    HPI: Passing flatus and stools. Pain control is marginal.     EXAM:  Date 10/13/20 0700 - 10/14/20 0659   Shift 4432-6438 5492-2065 5092-9364 24 Hour Total   INTAKE   P.O. 1305   1305   I.V. 758   758   Shift Total(mL/kg) 2063(27.2)   2063(27.2)   OUTPUT   Urine 2300   2300   Shift Total(mL/kg) 2300(30.33)   2300(30.33)   Weight (kg) 75.84 75.84 75.84 75.84     Temp: 98.2  F (36.8  C) Temp  Min: 96.8  F (36  C)  Max: 98.8  F (37.1  C)  Resp: 20 Resp  Min: 11  Max: 25  SpO2: 96 % SpO2  Min: 92 %  Max: 100 %  Pulse: 78 Pulse  Min: 65  Max: 99    No data recorded  BP: 123/67 Systolic (24hrs), Av , Min:103 , Max:130   Diastolic (24hrs), Av, Min:41, Max:80      GENERAL: alert, NAD  CV: RRR, nomurmurs  RESPIRATORY: no dyspnea, clear bilat  ABDOMEN: non-distended, +BS, soft, appropriately tender, incision c/d/i, no signs of infection  EXTREMITY: no edema, neg Frida's  SKIN: warm and dry, no jaundice norashes  NEURO: cranial nerves II-XII grossly intact, motor exam intact    LABS  Recent Labs   Lab Test 20  1534 18  1050 10/23/17  1623 10/23/17  1623 17  1204   WBC 6.8 6.6  --   --   --    RBC 4.20 3.97  --   --   --    HGB 13.8 13.2   < > 12.8 13.3   HCT 40.9 39.0  --  37.8 38.1   MCV 97 98   < > 96 97   MCH 32.9 33.2*  --  32.6 33.9   MCHC 33.7 33.8  --  33.9 34.9    262  --  318 208   MPV  --   --   --  9.0 9.3    < > = values in this interval not displayed.       Recent Labs   Lab Test 20  1534 18  1050   POTASSIUM 4.1 4.5   CHLORIDE 103 107   BUN 6* 15       Recent Labs   Lab Test 20  1534 18  1050   BILITOTAL 0.3 0.3   AST 30 23   ALT 25 13       IMAGING      ASSESSMENT  45 year old female POD # 1 s/p lap right colectomy for cecal polyp    PLAN  - Start percocet  - regular diet  - SLIV    Chucho Mercado MD on 10/13/2020 at 1:51 PM

## 2020-10-13 NOTE — PROGRESS NOTES
Face to face report given with opportunity to observe patient.    Report given to Lauren Spaeth, RN Manja Holter   10/13/2020  12:48 PM

## 2020-10-13 NOTE — PROGRESS NOTES
SAFETY CHECKLIST  ID Bands and Risk clasps correct and in place (DNR, Fall risk, Allergy, Latex, Limb):  Yes  All Lines Reconciled and labeled correctly: Yes  Whiteboard updated:Yes  Environmental interventions (bed/chair alarm on, call light, side rails, restraints, sitter....): Yes     Jenniffer Batres RN on 10/12/2020 at 7:17 PM

## 2020-10-13 NOTE — PLAN OF CARE
"Patient admitted for polyp removal in ascending colon. PO#1 VSS and WNL. Patient stable on room air. Pain not well controlled this morning, attempting to control with PRN and scheduled meds, see MAR. Ice applied to abdomen PRN. Patient up in room with SBA, attempted to have BM this morning with no success. Tolerating full liquid diet. Will continue to monitor.   Vital signs:  Temp: 98.2  F (36.8  C) Temp src: Tympanic BP: 123/67 Pulse: 78   Resp: 20 SpO2: 96 % O2 Device: None (Room air) Oxygen Delivery: 1/2 LPM Height: 154.9 cm (5' 1\") Weight: 75.8 kg (167 lb 3.2 oz)  Estimated body mass index is 31.59 kg/m  as calculated from the following:    Height as of this encounter: 1.549 m (5' 1\").    Weight as of this encounter: 75.8 kg (167 lb 3.2 oz).    Grecia العلي RN on 10/13/2020 at 10:20 AM    "

## 2020-10-13 NOTE — PLAN OF CARE
"Patient up to commode and had loose, green stool.  Active bowel sounds, passing flatus.  Lap sites have minimal drainage marked.  Patient pain at tolerable level with use of tylenol, toradol, dilaudid, oxycodone and cold packs. Patient alert and oriented.  Capnography throughout night.  VSS, afebrile.      /59   Pulse 80   Temp 97.7  F (36.5  C) (Tympanic)   Resp 18   Ht 1.549 m (5' 1\")   Wt 75.8 kg (167 lb 3.2 oz)   SpO2 94%   BMI 31.59 kg/m      "

## 2020-10-13 NOTE — PLAN OF CARE
Patient admitted for polyp removal in ascending colon. PO#1 VSS and WNL. Patient stable on room air. Pain controlled this afternoon with PRN and scheduled meds. Patient up and walking in hallway independently. Tolerating a regular diet, denies N/V. X4 Dressings in place with scant amounts of serosanguinous dried drainage. Will continue to monitor.

## 2020-10-14 VITALS
BODY MASS INDEX: 31.57 KG/M2 | SYSTOLIC BLOOD PRESSURE: 125 MMHG | DIASTOLIC BLOOD PRESSURE: 62 MMHG | WEIGHT: 167.2 LBS | HEART RATE: 78 BPM | RESPIRATION RATE: 16 BRPM | HEIGHT: 61 IN | OXYGEN SATURATION: 97 % | TEMPERATURE: 97.8 F

## 2020-10-14 PROCEDURE — C9113 INJ PANTOPRAZOLE SODIUM, VIA: HCPCS | Performed by: SURGERY

## 2020-10-14 PROCEDURE — 250N000011 HC RX IP 250 OP 636: Performed by: SURGERY

## 2020-10-14 PROCEDURE — 250N000013 HC RX MED GY IP 250 OP 250 PS 637: Performed by: SURGERY

## 2020-10-14 RX ORDER — OXYCODONE AND ACETAMINOPHEN 5; 325 MG/1; MG/1
1 TABLET ORAL EVERY 6 HOURS PRN
Qty: 12 TABLET | Refills: 0 | Status: SHIPPED | OUTPATIENT
Start: 2020-10-14 | End: 2020-10-17

## 2020-10-14 RX ORDER — AMOXICILLIN 250 MG
1 CAPSULE ORAL DAILY
Qty: 30 TABLET | Refills: 3 | Status: SHIPPED | OUTPATIENT
Start: 2020-10-14 | End: 2021-09-23

## 2020-10-14 RX ADMIN — PSEUDOEPHEDRINE HCL 30 MG: 30 TABLET, FILM COATED ORAL at 11:27

## 2020-10-14 RX ADMIN — OXYCODONE HYDROCHLORIDE AND ACETAMINOPHEN 1 TABLET: 5; 325 TABLET ORAL at 07:23

## 2020-10-14 RX ADMIN — VENLAFAXINE HYDROCHLORIDE 225 MG: 75 CAPSULE, EXTENDED RELEASE ORAL at 07:23

## 2020-10-14 RX ADMIN — PANTOPRAZOLE SODIUM 40 MG: 40 INJECTION, POWDER, FOR SOLUTION INTRAVENOUS at 09:16

## 2020-10-14 RX ADMIN — OXYCODONE HYDROCHLORIDE AND ACETAMINOPHEN 1 TABLET: 5; 325 TABLET ORAL at 11:27

## 2020-10-14 RX ADMIN — LORATADINE 10 MG: 10 TABLET ORAL at 09:16

## 2020-10-14 RX ADMIN — PSEUDOEPHEDRINE HCL 30 MG: 30 TABLET, FILM COATED ORAL at 07:23

## 2020-10-14 RX ADMIN — ENOXAPARIN SODIUM 40 MG: 40 INJECTION SUBCUTANEOUS at 09:16

## 2020-10-14 RX ADMIN — OXYCODONE HYDROCHLORIDE AND ACETAMINOPHEN 1 TABLET: 5; 325 TABLET ORAL at 02:46

## 2020-10-14 ASSESSMENT — ACTIVITIES OF DAILY LIVING (ADL)
ADLS_ACUITY_SCORE: 18

## 2020-10-14 NOTE — PHARMACY - DISCHARGE MEDICATION RECONCILIATION AND EDUCATION
Pharmacy:  Discharge Counseling and Medication Reconciliation    Radha ARANA Joseph  [address] [facility]  [place] [id number]  [phone] (home) [phone] (work)  45 year old female  PCP: Rashad Adhikari    Allergies: Sulfa drugs    Discharge Counseling:    Pharmacist met with patient and  today to review the medication portion of the After Visit Summary (with an emphasis on NEW medications) and to address patient's questions/concerns.    Summary of Education: Reviewed oxycodone/acetaminophen verbally    Materials Provided:  MedCounselor sheets printed from Clinical Pharmacology on: none (internet down)    Discharge Medication Reconciliation:    It has been determined that the patient has an adequate supply of medications available or which can be obtained from the patient's preferred pharmacy, which [facility].    Thank you for the consult.    Sary Hernandez [facility]........October 14, 2020 1:45 PM

## 2020-10-14 NOTE — PROGRESS NOTES
Discharge Note      Data:  Radha Ruiz discharged to home at 1345 via ambulation. Accompanied by spouse and staff.    Action:  Written discharge/follow-up instructions were provided to patient and spouse. Prescriptions sent with patient to fill . All belongings sent with patient.    Response:  Patient and  verbalized understanding of discharge instructions, reason for discharge, and necessary follow-up appointments.

## 2020-10-14 NOTE — PLAN OF CARE
Patient admitted for polyp removal of ascending colon. VSS and WNL. Pain controlled with PRN meds. Patient up in room and hallway independently. Tolerating regular diet with adequate intake and output. The patient has had a few bowel movements in the last 24 hours. Dressings intact with dried drainage. This writer assessed midline lap site this morning and there was some bright red blood draining from where steri strip was present. Bruising present around medial lap sites. Will continue to monitor.

## 2020-10-14 NOTE — PROGRESS NOTES
SAFETY CHECKLIST  ID Bands and Risk clasps correct and in place (DNR, Fall risk, Allergy, Latex, Limb):  Yes  All Lines Reconciled and labeled correctly: Yes  Whiteboard updated:Yes  Environmental interventions (bed/chair alarm on, call light, side rails, restraints, sitter....): Yes  Verify Tele #:     Yocasta Kenyon RN on 10/13/2020 at 7:31 PM

## 2020-10-14 NOTE — PLAN OF CARE
Problem: Pain Acute  Goal: Acceptable Pain Control and Functional Ability  Outcome: Improving     Problem: Adult Inpatient Plan of Care  Goal: Optimal Comfort and Wellbeing  Intervention: Provide Person-Centered Care  Recent Flowsheet Documentation  Taken 10/13/2020 1943 by Yocasta Kenyon, RN  Trust Relationship/Rapport: choices provided    Patient was able to rest comfortably through night. Remains independent in room. Alert and oriented x4. No nausea or vomiting. PRN Percocet was given x2 for abdominal pain, patient states this helps. Ice also applied to abdomen. VSS, dressings to lap sites are intact. Dressing marked, no changes this shift.    Yocasta Kenyon RN on 10/14/2020 at 3:42 AM

## 2020-10-14 NOTE — DISCHARGE SUMMARY
[facility] Discharge Summary    Radha Ruiz MRN# [id number]   Age: 45 year old YOB: 1975     Date of Admission:  10/12/2020  Date of Discharge::  10/14/2020  Admitting Physician:  Chucho Mercado MD  Discharge Physician:  Chucho Mercado MD     Home clinic: [facility]          Admission Diagnoses:   Polyp of ascending colon, unspecified type [K63.5]          Discharge Diagnosis:   Polyp of ascending colon, unspecified type [K63.5]          Procedures:   Procedure(s): Colectomy       No other procedures performed during this admission           Medications Prior to Admission:     Medications Prior to Admission   Medication Sig Dispense Refill Last Dose     [facility] ALLERGY RELIEF-D 5-120 MG 12 hr tablet TAKE ONE TABLET BY MOUTH TWO TIMES DAILY 180 tablet 5 10/11/2020 at PM     fluticasone (FLONASE) 50 MCG/ACT nasal spray INHALE 2 SPRAYS INTO BOTH NOSTRILS ONCE DAILY. 48 mL 3 10/11/2020 at AM     ibuprofen (ADVIL/MOTRIN) 200 MG tablet Take 200-600 mg by mouth every 6 hours as needed    Past Week at AM     linaclotide (LINZESS) 72 MCG capsule Take 1 capsule (72 mcg) by mouth daily 90 capsule 11 10/11/2020 at AM     traZODone (DESYREL) 100 MG tablet TAKE 3-4 TABLETS BY MOUTH AT BEDTIME. 360 tablet 3 10/11/2020 at PM     venlafaxine (EFFEXOR-XR) 150 MG 24 hr capsule TAKE 1 CAPSULE BY MOUTH EVERY DAY WITH FOOD 90 capsule 11 10/11/2020 at AM     venlafaxine (EFFEXOR-XR) 75 MG 24 hr capsule Take 1 capsule (75 mg) by mouth daily Take with the 150 for a total of 225 mg daily. 90 capsule 11 10/11/2020 at AM     Vitamin D, Cholecalciferol, 25 MCG (1000 UT) CAPS Take 10,000 Units by mouth daily   10/11/2020 at AM             Discharge Medications:     Current Discharge Medication List      START taking these medications    Details   oxyCODONE-acetaminophen (PERCOCET) 5-325 MG tablet Take 1 tablet by mouth every 6 hours as needed for pain  Qty: 12 tablet, Refills: 0    Associated Diagnoses: Polyp  of ascending colon, unspecified type      senna-docusate (SENOKOT-S/PERICOLACE) 8.6-50 MG tablet Take 1 tablet by mouth daily  Qty: 30 tablet, Refills: 3    Associated Diagnoses: Polyp of ascending colon, unspecified type         CONTINUE these medications which have NOT CHANGED    Details   [facility] ALLERGY RELIEF-D 5-120 MG 12 hr tablet TAKE ONE TABLET BY MOUTH TWO TIMES DAILY  Qty: 180 tablet, Refills: 5    Comments: Not to exceed 3 additional fills before 06/23/2020  Associated Diagnoses: Chronic allergic rhinitis due to pollen      fluticasone (FLONASE) 50 MCG/ACT nasal spray INHALE 2 SPRAYS INTO BOTH NOSTRILS ONCE DAILY.  Qty: 48 mL, Refills: 3    Associated Diagnoses: Seasonal allergic rhinitis due to pollen      ibuprofen (ADVIL/MOTRIN) 200 MG tablet Take 200-600 mg by mouth every 6 hours as needed       linaclotide (LINZESS) 72 MCG capsule Take 1 capsule (72 mcg) by mouth daily  Qty: 90 capsule, Refills: 11    Associated Diagnoses: Irritable bowel syndrome, unspecified type      traZODone (DESYREL) 100 MG tablet TAKE 3-4 TABLETS BY MOUTH AT BEDTIME.  Qty: 360 tablet, Refills: 3    Associated Diagnoses: Anxiety state; Recurrent major depression in partial remission (H)      !! venlafaxine (EFFEXOR-XR) 150 MG 24 hr capsule TAKE 1 CAPSULE BY MOUTH EVERY DAY WITH FOOD  Qty: 90 capsule, Refills: 11    Associated Diagnoses: Anxiety state; Depression, major, recurrent, in partial remission (H)      !! venlafaxine (EFFEXOR-XR) 75 MG 24 hr capsule Take 1 capsule (75 mg) by mouth daily Take with the 150 for a total of 225 mg daily.  Qty: 90 capsule, Refills: 11    Associated Diagnoses: Depression, major, recurrent, in partial remission (H); Anxiety state      Vitamin D, Cholecalciferol, 25 MCG (1000 UT) CAPS Take 10,000 Units by mouth daily       !! - Potential duplicate medications found. Please discuss with provider.                Consultations:   No consultations were requested during this admission          Brief  History of Illness:   Pt was found to have a large cecal polyp.  Pt chose to have a colectomy to ensure complete removal.             Hospital Course:   The patient's hospital course was unremarkable.  She recovered as anticipated and experienced no post-operative complications. The wound at the umbilicus had serous discharge.           Discharge Instructions and Follow-Up:   Discharge diet: Regular   Discharge activity: Activity as tolerated   Discharge follow-up:   Follow up with me in 2 weeks   Wound care: Ice to area for comfort  May get incision wet in shower but do not soak or scrub           Discharge Disposition:   Discharged to home      Attestation:  I have reviewed today's vital signs, notes, medications, labs and imaging.  Amount of time performed on this discharge summary: 25 minutes.    Chucho Mercado MD

## 2020-10-14 NOTE — PLAN OF CARE
Patient VSS and WNL. Dr. Mercado reviewed discharge care for patient lap sites and dressings. Pain controlled with PRN meds, see MAR. Tolerating regular diet. Will continue to monitor.

## 2020-10-27 ENCOUNTER — OFFICE VISIT (OUTPATIENT)
Dept: SURGERY | Facility: OTHER | Age: 45
End: 2020-10-27
Attending: SURGERY
Payer: COMMERCIAL

## 2020-10-27 VITALS
RESPIRATION RATE: 16 BRPM | HEART RATE: 88 BPM | BODY MASS INDEX: 30.04 KG/M2 | TEMPERATURE: 98 F | DIASTOLIC BLOOD PRESSURE: 78 MMHG | SYSTOLIC BLOOD PRESSURE: 110 MMHG | WEIGHT: 159 LBS

## 2020-10-27 DIAGNOSIS — D12.0 ADENOMA OF CECUM: Primary | ICD-10-CM

## 2020-10-27 PROCEDURE — 99024 POSTOP FOLLOW-UP VISIT: CPT | Performed by: SURGERY

## 2020-10-27 ASSESSMENT — PAIN SCALES - GENERAL: PAINLEVEL: EXTREME PAIN (8)

## 2020-10-27 NOTE — NURSING NOTE
"Chief Complaint   Patient presents with     Surgical Followup     s/p colectomy       Initial /78 (BP Location: Right arm, Patient Position: Sitting, Cuff Size: Adult Regular)   Pulse 88   Temp 98  F (36.7  C) (Tympanic)   Resp 16   Wt 72.1 kg (159 lb)   BMI 30.04 kg/m   Estimated body mass index is 30.04 kg/m  as calculated from the following:    Height as of 10/13/20: 1.549 m (5' 1\").    Weight as of this encounter: 72.1 kg (159 lb).  Medication Reconciliation: complete    Yasmin Ambriz LPN    "

## 2020-10-27 NOTE — PROGRESS NOTES
Patient presents for post surgical visit after lap right colectomy on 10/12/2020. Patient has done well. No problems with incisions. Felt good until Sunday, Now has some RLQ pain.     /78 (BP Location: Right arm, Patient Position: Sitting, Cuff Size: Adult Regular)   Pulse 88   Temp 98  F (36.7  C) (Tympanic)   Resp 16   Wt 72.1 kg (159 lb)   BMI 30.04 kg/m      General: NAD, pleasant and cooperative with exam and interview.  Abdomen: healing incisions. No sign of infection. Mild RLQ pain with palpation.  Psychiatry: awake, alert and oriented. Appropriate affect.    Assessment/Plan:   45 year old female s/p lap right colectomy. Cecal lesion was 2 cm sessile serrated adenoma. Additional 1.5 cm polyp in the right colon noted.     - Follow-up in 3 years  - CT abd and pelvis if pain worsens, fevers develop.  Likely hematoma, but cannot rule out abscess ect.     Chucho Mercado MD on 10/27/2020 at 10:12 AM

## 2021-01-15 ENCOUNTER — HEALTH MAINTENANCE LETTER (OUTPATIENT)
Age: 46
End: 2021-01-15

## 2021-01-23 ENCOUNTER — HEALTH MAINTENANCE LETTER (OUTPATIENT)
Age: 46
End: 2021-01-23

## 2021-04-06 DIAGNOSIS — J30.1 CHRONIC ALLERGIC RHINITIS DUE TO POLLEN: ICD-10-CM

## 2021-04-07 RX ORDER — CETIRIZINE HCL 10 MG
TABLET ORAL
Qty: 60 TABLET | Refills: 3 | Status: SHIPPED | OUTPATIENT
Start: 2021-04-07 | End: 2021-09-23

## 2021-04-07 NOTE — TELEPHONE ENCOUNTER
[facility] [place] sent Rx request for the following:      Requested Prescriptions   Pending Prescriptions Disp Refills     [facility] ALLERGY RELIEF-D 5-120 MG 12 hr tablet [Pharmacy Med Name: [facility] CETIRIZINE-D TABLET] 60 tablet      Sig: TAKE 1 TABLET BY MOUTH TWICE A DAY       There is no refill protocol information for this order          Last Prescription Date:   3/20/2020  Last Fill Qty/Refills:         180, R-5  Last Office Visit:              8/3/2020 OV with [facility]  Future Office visit:           None noted    Routing refill request to provider for review/approval because:  Drug not on the [facility], [facility] or [facility] refill protocol or controlled substance.    Unable to complete prescription refill per RN Medication Refill Policy.   Malgorzata Cole RN on 4/7/2021 at 8:26 AM

## 2021-07-19 DIAGNOSIS — F41.1 ANXIETY STATE: ICD-10-CM

## 2021-07-19 DIAGNOSIS — F33.41 RECURRENT MAJOR DEPRESSION IN PARTIAL REMISSION (H): ICD-10-CM

## 2021-07-19 DIAGNOSIS — F33.41 DEPRESSION, MAJOR, RECURRENT, IN PARTIAL REMISSION (H): ICD-10-CM

## 2021-07-19 NOTE — LETTER
July 21, 2021      Radha Ruiz  [address] [address]        Dear Radha,       A refill of Venlafaxine  and Trazadone have been requested by your pharmacy and we noticed that you are due for an annual exam.  Your last comprehensive visit with Dr. Adhikari was on 8/3/2020.    This refill request has been sent to your provider for consideration at this time.    Your health is very important to us.  Please call the clinic at [phone] to schedule your appointment.    Thank you for choosing [facility] and [facility] for your health care needs.    Sincerely,    Refill RN  [facility]

## 2021-07-21 RX ORDER — TRAZODONE HYDROCHLORIDE 100 MG/1
TABLET ORAL
Qty: 360 TABLET | Refills: 0 | Status: SHIPPED | OUTPATIENT
Start: 2021-07-21 | End: 2021-09-23

## 2021-07-21 RX ORDER — VENLAFAXINE HYDROCHLORIDE 150 MG/1
CAPSULE, EXTENDED RELEASE ORAL
Qty: 90 CAPSULE | Refills: 0 | Status: SHIPPED | OUTPATIENT
Start: 2021-07-21 | End: 2021-09-23

## 2021-07-21 NOTE — TELEPHONE ENCOUNTER
"[facility]  sent Rx request for the following:     Requested Prescriptions   Pending Prescriptions Disp Refills     venlafaxine (EFFEXOR-XR) 150 MG 24 hr capsule [Pharmacy Med Name: VENLAFAXINE HCL  MG CAP] 90 capsule 7     Sig: TAKE 1 CAPSULE BY MOUTH EVERY DAY WITH FOOD      Last Prescription Date:   8/3/2020  Last Fill Qty/Refills:         90, R-11    Last Office Visit:              8/3/2020  Future Office visit:           none      Routing refill request to provider for review/approval because:  PHQ-9 and GAD7 Scores 8/3/2020   PHQ-9 Total Score 15   ERMELINDA-7 Total Score 14         Serotonin-Norepinephrine Reuptake Inhibitors  Failed - 7/19/2021  7:20 AM        Failed - PHQ-9 score of less than 5 in past 6 months     Please review last PHQ-9 score.           Failed - Recent (6 mo) or future (30 days) visit within the authorizing provider's specialty     Patient had office visit in the last 6 months or has a visit in the next 30 days with authorizing provider or within the authorizing provider's specialty.  See \"Patient Info\" tab in inbasket, or \"Choose Columns\" in Meds & Orders section of the refill encounter.              traZODone (DESYREL) 100 MG tablet [Pharmacy Med Name: TRAZODONE 100 MG TABLET] 360 tablet 3     Sig: TAKE 3-4 TABLETS BY MOUTH AT BEDTIME.          Last Prescription Date:   6/17/2020  Last Fill Qty/Refills:         360, R-3      Routing refill request to provider for review/approval because:      Serotonin Modulators Passed - 7/19/2021  7:20 AM        Passed - Recent (12 mo) or future (30 days) visit within the authorizing provider's specialty     Patient has had an office visit with the authorizing provider or a provider within the authorizing providers department within the previous 12 mos or has a future within next 30 days. See \"Patient Info\" tab in inbasket, or \"Choose Columns\" in Meds & Orders section of the refill encounter.              Passed - Medication is active on med list    "     Passed - Patient is age 18 or older        Passed - No active pregnancy on record        Passed - No positive pregnancy test in past 12 months           Overridden by Rashad Adhikari MD on Aug 3, 2020 4:31 PM   Drug-Drug   1. TRAZODONE / SEROTONIN/NOREPINEPHRINE REUPTAKE INHIBITORS [Level: Major] [Reason: Tolerated medication/side effects in past]   Other Orders: venlafaxine (EFFEXOR-XR) 75 MG 24 hr capsule            1 Limited refill to prevent break. Letter mailed to patient.   1 rx routed to provider to review. Unable to complete prescription refill per RN Medication Refill Policy.................... Lizzette Zhang RN ....................  7/21/2021   10:55 AM

## 2021-09-04 ENCOUNTER — HEALTH MAINTENANCE LETTER (OUTPATIENT)
Age: 46
End: 2021-09-04

## 2021-09-23 ENCOUNTER — OFFICE VISIT (OUTPATIENT)
Dept: FAMILY MEDICINE | Facility: OTHER | Age: 46
End: 2021-09-23
Attending: FAMILY MEDICINE
Payer: COMMERCIAL

## 2021-09-23 VITALS
DIASTOLIC BLOOD PRESSURE: 84 MMHG | BODY MASS INDEX: 31.26 KG/M2 | HEART RATE: 94 BPM | WEIGHT: 165.6 LBS | TEMPERATURE: 97.2 F | RESPIRATION RATE: 16 BRPM | SYSTOLIC BLOOD PRESSURE: 124 MMHG | OXYGEN SATURATION: 99 % | HEIGHT: 61 IN

## 2021-09-23 DIAGNOSIS — Z00.00 VISIT FOR PREVENTIVE HEALTH EXAMINATION: Primary | ICD-10-CM

## 2021-09-23 DIAGNOSIS — Z13.228 SCREENING FOR METABOLIC DISORDER: ICD-10-CM

## 2021-09-23 DIAGNOSIS — Z13.220 SCREENING FOR HYPERLIPIDEMIA: ICD-10-CM

## 2021-09-23 DIAGNOSIS — Z86.0101 H/O ADENOMATOUS POLYP OF COLON: ICD-10-CM

## 2021-09-23 DIAGNOSIS — F33.41 RECURRENT MAJOR DEPRESSION IN PARTIAL REMISSION (H): ICD-10-CM

## 2021-09-23 DIAGNOSIS — J30.1 CHRONIC ALLERGIC RHINITIS DUE TO POLLEN: ICD-10-CM

## 2021-09-23 DIAGNOSIS — K58.9 IRRITABLE BOWEL SYNDROME, UNSPECIFIED TYPE: ICD-10-CM

## 2021-09-23 DIAGNOSIS — Z13.0 SCREENING FOR DEFICIENCY ANEMIA: ICD-10-CM

## 2021-09-23 DIAGNOSIS — J30.1 SEASONAL ALLERGIC RHINITIS DUE TO POLLEN: ICD-10-CM

## 2021-09-23 DIAGNOSIS — Z80.0 FH: COLON CANCER: ICD-10-CM

## 2021-09-23 DIAGNOSIS — F33.41 DEPRESSION, MAJOR, RECURRENT, IN PARTIAL REMISSION (H): ICD-10-CM

## 2021-09-23 DIAGNOSIS — F41.1 ANXIETY STATE: ICD-10-CM

## 2021-09-23 DIAGNOSIS — Z12.31 VISIT FOR SCREENING MAMMOGRAM: ICD-10-CM

## 2021-09-23 DIAGNOSIS — Z13.29 SCREENING FOR HYPOTHYROIDISM: ICD-10-CM

## 2021-09-23 LAB
ALBUMIN SERPL-MCNC: 4.5 G/DL (ref 3.5–5.7)
ALP SERPL-CCNC: 64 U/L (ref 34–104)
ALT SERPL W P-5'-P-CCNC: 26 U/L (ref 7–52)
ANION GAP SERPL CALCULATED.3IONS-SCNC: 7 MMOL/L (ref 3–14)
AST SERPL W P-5'-P-CCNC: 26 U/L (ref 13–39)
BASOPHILS # BLD AUTO: 0.1 10E3/UL (ref 0–0.2)
BASOPHILS NFR BLD AUTO: 1 %
BILIRUB SERPL-MCNC: 0.4 MG/DL (ref 0.3–1)
BUN SERPL-MCNC: 7 MG/DL (ref 7–25)
CALCIUM SERPL-MCNC: 9.7 MG/DL (ref 8.6–10.3)
CHLORIDE BLD-SCNC: 103 MMOL/L (ref 98–107)
CHOLEST SERPL-MCNC: 173 MG/DL
CO2 SERPL-SCNC: 28 MMOL/L (ref 21–31)
CREAT SERPL-MCNC: 0.89 MG/DL (ref 0.6–1.2)
EOSINOPHIL # BLD AUTO: 0.1 10E3/UL (ref 0–0.7)
EOSINOPHIL NFR BLD AUTO: 1 %
ERYTHROCYTE [DISTWIDTH] IN BLOOD BY AUTOMATED COUNT: 12.1 % (ref 10–15)
FASTING STATUS PATIENT QL REPORTED: NO
GFR SERPL CREATININE-BSD FRML MDRD: 77 ML/MIN/1.73M2
GLUCOSE BLD-MCNC: 94 MG/DL (ref 70–105)
HCT VFR BLD AUTO: 41.5 % (ref 35–47)
HDLC SERPL-MCNC: 62 MG/DL (ref 23–92)
HGB BLD-MCNC: 14 G/DL (ref 11.7–15.7)
IMM GRANULOCYTES # BLD: 0 10E3/UL
IMM GRANULOCYTES NFR BLD: 0 %
LDLC SERPL CALC-MCNC: 91 MG/DL
LYMPHOCYTES # BLD AUTO: 2.2 10E3/UL (ref 0.8–5.3)
LYMPHOCYTES NFR BLD AUTO: 33 %
MCH RBC QN AUTO: 32.5 PG (ref 26.5–33)
MCHC RBC AUTO-ENTMCNC: 33.7 G/DL (ref 31.5–36.5)
MCV RBC AUTO: 96 FL (ref 78–100)
MONOCYTES # BLD AUTO: 0.4 10E3/UL (ref 0–1.3)
MONOCYTES NFR BLD AUTO: 6 %
NEUTROPHILS # BLD AUTO: 4 10E3/UL (ref 1.6–8.3)
NEUTROPHILS NFR BLD AUTO: 59 %
NONHDLC SERPL-MCNC: 111 MG/DL
NRBC # BLD AUTO: 0 10E3/UL
NRBC BLD AUTO-RTO: 0 /100
PLATELET # BLD AUTO: 305 10E3/UL (ref 150–450)
POTASSIUM BLD-SCNC: 3.9 MMOL/L (ref 3.5–5.1)
PROT SERPL-MCNC: 7.1 G/DL (ref 6.4–8.9)
RBC # BLD AUTO: 4.31 10E6/UL (ref 3.8–5.2)
SODIUM SERPL-SCNC: 138 MMOL/L (ref 134–144)
TRIGL SERPL-MCNC: 101 MG/DL
TSH SERPL DL<=0.005 MIU/L-ACNC: 1.01 MU/L (ref 0.4–4)
WBC # BLD AUTO: 6.7 10E3/UL (ref 4–11)

## 2021-09-23 PROCEDURE — 36415 COLL VENOUS BLD VENIPUNCTURE: CPT | Mod: ZL | Performed by: FAMILY MEDICINE

## 2021-09-23 PROCEDURE — 83718 ASSAY OF LIPOPROTEIN: CPT | Mod: ZL | Performed by: FAMILY MEDICINE

## 2021-09-23 PROCEDURE — 82247 BILIRUBIN TOTAL: CPT | Mod: ZL | Performed by: FAMILY MEDICINE

## 2021-09-23 PROCEDURE — 85025 COMPLETE CBC W/AUTO DIFF WBC: CPT | Mod: ZL | Performed by: FAMILY MEDICINE

## 2021-09-23 PROCEDURE — 90471 IMMUNIZATION ADMIN: CPT | Performed by: FAMILY MEDICINE

## 2021-09-23 PROCEDURE — 84443 ASSAY THYROID STIM HORMONE: CPT | Mod: ZL | Performed by: FAMILY MEDICINE

## 2021-09-23 PROCEDURE — 82040 ASSAY OF SERUM ALBUMIN: CPT | Mod: ZL | Performed by: FAMILY MEDICINE

## 2021-09-23 PROCEDURE — 99396 PREV VISIT EST AGE 40-64: CPT | Mod: 25 | Performed by: FAMILY MEDICINE

## 2021-09-23 PROCEDURE — 90686 IIV4 VACC NO PRSV 0.5 ML IM: CPT | Performed by: FAMILY MEDICINE

## 2021-09-23 RX ORDER — TRAZODONE HYDROCHLORIDE 100 MG/1
TABLET ORAL
Qty: 360 TABLET | Refills: 11 | Status: SHIPPED | OUTPATIENT
Start: 2021-09-23 | End: 2022-10-24

## 2021-09-23 RX ORDER — VENLAFAXINE HYDROCHLORIDE 75 MG/1
75 CAPSULE, EXTENDED RELEASE ORAL DAILY
Qty: 90 CAPSULE | Refills: 11 | Status: SHIPPED | OUTPATIENT
Start: 2021-09-23 | End: 2022-10-24

## 2021-09-23 RX ORDER — CETIRIZINE HYDROCHLORIDE, PSEUDOEPHEDRINE HYDROCHLORIDE 5; 120 MG/1; MG/1
TABLET, FILM COATED, EXTENDED RELEASE ORAL
Qty: 60 TABLET | Refills: 3 | Status: SHIPPED | OUTPATIENT
Start: 2021-09-23 | End: 2022-03-02

## 2021-09-23 RX ORDER — FLUTICASONE PROPIONATE 50 MCG
SPRAY, SUSPENSION (ML) NASAL
Qty: 48 ML | Refills: 11 | Status: SHIPPED | OUTPATIENT
Start: 2021-09-23 | End: 2022-10-24

## 2021-09-23 RX ORDER — VENLAFAXINE HYDROCHLORIDE 150 MG/1
CAPSULE, EXTENDED RELEASE ORAL
Qty: 90 CAPSULE | Refills: 11 | Status: SHIPPED | OUTPATIENT
Start: 2021-09-23 | End: 2022-10-24

## 2021-09-23 ASSESSMENT — ANXIETY QUESTIONNAIRES
7. FEELING AFRAID AS IF SOMETHING AWFUL MIGHT HAPPEN: NOT AT ALL
5. BEING SO RESTLESS THAT IT IS HARD TO SIT STILL: NEARLY EVERY DAY
6. BECOMING EASILY ANNOYED OR IRRITABLE: MORE THAN HALF THE DAYS
3. WORRYING TOO MUCH ABOUT DIFFERENT THINGS: NEARLY EVERY DAY
8. IF YOU CHECKED OFF ANY PROBLEMS, HOW DIFFICULT HAVE THESE MADE IT FOR YOU TO DO YOUR WORK, TAKE CARE OF THINGS AT HOME, OR GET ALONG WITH OTHER PEOPLE?: VERY DIFFICULT
4. TROUBLE RELAXING: NEARLY EVERY DAY
1. FEELING NERVOUS, ANXIOUS, OR ON EDGE: MORE THAN HALF THE DAYS
GAD7 TOTAL SCORE: 16
2. NOT BEING ABLE TO STOP OR CONTROL WORRYING: NEARLY EVERY DAY
7. FEELING AFRAID AS IF SOMETHING AWFUL MIGHT HAPPEN: NOT AT ALL
GAD7 TOTAL SCORE: 16
GAD7 TOTAL SCORE: 16

## 2021-09-23 ASSESSMENT — PAIN SCALES - GENERAL: PAINLEVEL: MODERATE PAIN (4)

## 2021-09-23 ASSESSMENT — PATIENT HEALTH QUESTIONNAIRE - PHQ9
SUM OF ALL RESPONSES TO PHQ QUESTIONS 1-9: 15
10. IF YOU CHECKED OFF ANY PROBLEMS, HOW DIFFICULT HAVE THESE PROBLEMS MADE IT FOR YOU TO DO YOUR WORK, TAKE CARE OF THINGS AT HOME, OR GET ALONG WITH OTHER PEOPLE: SOMEWHAT DIFFICULT
SUM OF ALL RESPONSES TO PHQ QUESTIONS 1-9: 15

## 2021-09-23 ASSESSMENT — MIFFLIN-ST. JEOR: SCORE: 1314.57

## 2021-09-23 NOTE — PROGRESS NOTES
There are no exam notes on file for this visit.    SUBJECTIVE:  Radha Ruiz  is a 46 year old female who comes in today for complete evaluation.    She is about a year out for lap right colectomy for a 2 cm sessile serrated adenoma with family history of colon cancer.  Follow-up colonoscopy is recommended in 2023.    She is due for mammogram.  She continues to smoke.    She continues on Effexor XR for depression and anxiety.  She is currently on 225 mg daily.  She takes trazodone at bedtime. She has had increased anxiety. They moved this summer. She is doing a lot of the remodeling on the house.     Her mother was in the ER after a fall and she [place]ha [place]. She [place].  She brought her back here and she wasn't doing well.  She went to the ER by ambulance and was evaluated and nothing was found. She took her to [place] and went to rehab.  She seemed to get better.  She wouldn't walk and then all of a sudden she could. She is not sure where she wants to live. Now her brother is staying with her.      She is having some butterflies in her stomach and feels very stressed.     PHQ 6/24/2019 8/3/2020 9/23/2021   PHQ-9 Total Score 2 15 15   Q9: Thoughts of better off dead/self-harm past 2 weeks Not at all Not at all Not at all     ERMELINDA-7 SCORE 6/24/2019 8/3/2020 9/23/2021   Total Score - - 16 (severe anxiety)   Total Score 1 14 16         She continues on Linzess for irritable bowel syndrome which seems to be working reasonably well for her.    She is up-to-date on immunizations including COVID-19 vaccine but has not had her flu shot this year.    Vahid is planning to do a study abroad in [place] early next year. Wally is playing soccer.    Past Medical, Family, and Social History reviewed and updated as noted below.   ROS is negative except as noted above       Allergies   Allergen Reactions     Sulfa Drugs Hives   ,   Family History   Problem Relation Age of Onset     Colon Cancer Father  38        Cancer-colon,Colon cancer     Other - See Comments Mother         Psychiatric illness,Untreated anxiety/D&C for postmenopausal bleeding benign     Cancer Maternal Grandfather         Cancer,Brain     Heart Disease Maternal Grandfather         Heart Disease,MI     Hypertension Maternal Grandfather         Hypertension     Other - See Comments Maternal Uncle         Hemochromatosis     Hypertension Maternal Grandmother         Hypertension     Other - See Comments Maternal Grandmother         Alzheimer's     Diabetes Other         Diabetes,Diabetes     Heart Disease Paternal Grandmother         Heart Disease,CHF     Cancer Maternal Uncle         Cancer,cancer all over.     Cancer Paternal Uncle         Cancer,Lung cancer     Breast Cancer No family hx of         Cancer-breast   ,   Current Outpatient Medications   Medication     cetirizine-pseudoePHEDrine ER ([facility] ALLERGY RELIEF-D) 5-120 MG 12 hr tablet     fluticasone (FLONASE) 50 MCG/ACT nasal spray     linaclotide (LINZESS) 72 MCG capsule     traZODone (DESYREL) 100 MG tablet     venlafaxine (EFFEXOR-XR) 150 MG 24 hr capsule     venlafaxine (EFFEXOR-XR) 75 MG 24 hr capsule     Vitamin D, Cholecalciferol, 25 MCG (1000 UT) CAPS     No current facility-administered medications for this visit.   ,   Past Medical History:   Diagnosis Date     Encounter for gynecological examination without abnormal finding     08,Satisfactory GYN examination     Other pulmonary embolism without acute cor pulmonale (H)     History of pulmonary emboli after      Personal history of other medical treatment (CODE)      2, para 1-0-1-1     Personal history of other medical treatment (CODE)     10/00,History of blood transfusion with    ,   Patient Active Problem List    Diagnosis Date Noted     Polyp of ascending colon, unspecified type 10/02/2020     Priority: Medium     Added automatically from request for surgery [id number]       Tobacco abuse  "2018     Priority: Medium     Chronic allergic rhinitis due to pollen, unspecified seasonality 2018     Priority: Medium     Anxiety state 2018     Priority: Medium     Constipation 2018     Priority: Medium     Migraine headache 2018     Priority: Medium     Restless leg syndrome 2018     Priority: Medium     Surgical menopause 2015     Priority: Medium     H/O adenomatous polyp of colon 10/06/2014     Priority: Medium     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     ,   Past Surgical History:   Procedure Laterality Date     ant/post colporr w enterocele incl cysturethroscopy  2017    Dr. Mc      SECTION      10/00 /06,History of blood transfusion with      COLONOSCOPY      ,,,F/U      COLONOSCOPY N/A 2020    Procedure: COLONOSCOPY, WITH POLYPECTOMY AND BIOPSY;  Surgeon: Rachel Acevedo MD;  Location:  OR     COLONOSCOPY N/A 10/12/2020    1 sessile serrated, 1 tubular - f/u 3 years     Cryotherapy of Cervix       LAPAROSCOPIC ASSISTED HYSTERECTOMY VAGINAL, BILATERAL SALPINGO-OOPHORECTOMY, COMBINED      Ney Hart MD [facility]     LAPAROSCOPIC TUBAL LIGATION      2006    and   Social History     Tobacco Use     Smoking status: Current Every Day Smoker     Packs/day: 0.13     Years: 30.00     Pack years: 3.90     Types: Cigarettes     Smokeless tobacco: Never Used   Substance Use Topics     Alcohol use: Yes     Comment: Alcoholic Drinks/day: vodka 4-5 times a week     OBJECTIVE:  /84   Pulse 94   Temp 97.2  F (36.2  C) (Temporal)   Resp 16   Ht 1.543 m (5' 0.75\")   Wt 75.1 kg (165 lb 9.6 oz)   SpO2 99%   Breastfeeding No   BMI 31.55 kg/m     EXAM:  General " Appearance: Pleasant, alert, appropriate appearance for age. No acute distress  Head Exam: Normal. Normocephalic, atraumatic.  Eye Exam: PERRLA, EOMI, conjunctivae, sclerae normal.  Ear Exam: Normal TM's bilaterally. Normal auditory canals and external ears. Non-tender.  Nose Exam: Normal external nose, mucus membranes, and septum.  OroPharynx Exam:  Dental hygiene adequate. Normal buccal mucosa. Normal pharynx.  Neck Exam:  Supple, no masses or nodes. No bruits  Thyroid Exam: No nodules or enlargement.  Chest/Respiratory Exam: Normal chest wall and respirations. Clear to auscultation.  Breast Exam: No dimpling, nipple retraction or discharge. No masses or nodes.  Cardiovascular Exam: Regular rate and rhythm. S1, S2, no murmur, click, gallop, or rubs.  Gastrointestinal Exam: Soft, non-tender, no masses or organomegaly.  Lymphatic Exam: Non-palpable nodes in neck,clavicular, axillary, or inguinal regions.  Musculoskeletal Exam: Back is straight and non-tender, full ROM of upper and lower extremities.  Foot Exam: Left and right foot: good pedal pulses   Skin: no rash or abnormalities  Neurologic Exam:  normal gross motor, tone coordination and no tremor.  Psychiatric Exam: Alert and oriented - appropriate affect.     Results for orders placed or performed in visit on 09/23/21   Comprehensive metabolic panel     Status: Normal   Result Value Ref Range    Sodium 138 134 - 144 mmol/L    Potassium 3.9 3.5 - 5.1 mmol/L    Chloride 103 98 - 107 mmol/L    Carbon Dioxide (CO2) 28 21 - 31 mmol/L    Anion Gap 7 3 - 14 mmol/L    Urea Nitrogen 7 7 - 25 mg/dL    Creatinine 0.89 0.60 - 1.20 mg/dL    Calcium 9.7 8.6 - 10.3 mg/dL    Glucose 94 70 - 105 mg/dL    Alkaline Phosphatase 64 34 - 104 U/L    AST 26 13 - 39 U/L    ALT 26 7 - 52 U/L    Protein Total 7.1 6.4 - 8.9 g/dL    Albumin 4.5 3.5 - 5.7 g/dL    Bilirubin Total 0.4 0.3 - 1.0 mg/dL    GFR Estimate 77 >60 mL/min/1.73m2   Lipid Profile     Status: None   Result Value Ref  Range    Cholesterol 173 <200 mg/dL    Triglycerides 101 <150 mg/dL    Direct Measure HDL 62 23 - 92 mg/dL    LDL Cholesterol Calculated 91 <=100 mg/dL    Non HDL Cholesterol 111 <130 mg/dL    Patient Fasting > 8hrs? No     Narrative    Cholesterol  Desirable:  <200 mg/dL    Triglycerides  Normal:  Less than 150 mg/dL  Borderline High:  150-199 mg/dL  High:  200-499 mg/dL  Very High:  Greater than or equal to 500 mg/dL    Direct Measure HDL  Female:  Greater than or equal to 50 mg/dL   Male:  Greater than or equal to 40 mg/dL    LDL Cholesterol  Desirable:  <100mg/dL  Above Desirable:  100-129 mg/dL   Borderline High:  130-159 mg/dL   High:  160-189 mg/dL   Very High:  >= 190 mg/dL    Non HDL Cholesterol  Desirable:  130 mg/dL  Above Desirable:  130-159 mg/dL  Borderline High:  160-189 mg/dL  High:  190-219 mg/dL  Very High:  Greater than or equal to 220 mg/dL   TSH     Status: Normal   Result Value Ref Range    TSH 1.01 0.40 - 4.00 mU/L   CBC with platelets and differential     Status: None   Result Value Ref Range    WBC Count 6.7 4.0 - 11.0 10e3/uL    RBC Count 4.31 3.80 - 5.20 10e6/uL    Hemoglobin 14.0 11.7 - 15.7 g/dL    Hematocrit 41.5 35.0 - 47.0 %    MCV 96 78 - 100 fL    MCH 32.5 26.5 - 33.0 pg    MCHC 33.7 31.5 - 36.5 g/dL    RDW 12.1 10.0 - 15.0 %    Platelet Count 305 150 - 450 10e3/uL    % Neutrophils 59 %    % Lymphocytes 33 %    % Monocytes 6 %    % Eosinophils 1 %    % Basophils 1 %    % Immature Granulocytes 0 %    NRBCs per 100 WBC 0 <1 /100    Absolute Neutrophils 4.0 1.6 - 8.3 10e3/uL    Absolute Lymphocytes 2.2 0.8 - 5.3 10e3/uL    Absolute Monocytes 0.4 0.0 - 1.3 10e3/uL    Absolute Eosinophils 0.1 0.0 - 0.7 10e3/uL    Absolute Basophils 0.1 0.0 - 0.2 10e3/uL    Absolute Immature Granulocytes 0.0 <=0.0 10e3/uL    Absolute NRBCs 0.0 10e3/uL   CBC with Platelets & Differential     Status: None    Narrative    The following orders were created for panel order CBC with Platelets &  Differential.  Procedure                               Abnormality         Status                     ---------                               -----------         ------                     CBC with platelets and d...[[id number]]                      Final result                 Please view results for these tests on the individual orders.      ASSESSMENT/Plan :    Radha was seen today for physical.    Diagnoses and all orders for this visit:    Visit for preventive health examination    Screening for deficiency anemia  -     CBC with Platelets & Differential; Future  -     CBC with Platelets & Differential    Screening for metabolic disorder  -     Comprehensive metabolic panel; Future  -     Comprehensive metabolic panel    Screening for hyperlipidemia  -     Lipid Profile; Future  -     Lipid Profile    Screening for hypothyroidism  -     TSH; Future  -     TSH    FH: colon cancer    Irritable bowel syndrome, unspecified type  -     linaclotide (LINZESS) 72 MCG capsule; Take 1 capsule (72 mcg) by mouth daily    H/O adenomatous polyp of colon    Depression, major, recurrent, in partial remission (H)  -     venlafaxine (EFFEXOR-XR) 150 MG 24 hr capsule; TAKE 1 CAPSULE BY MOUTH EVERY DAY WITH FOOD  -     venlafaxine (EFFEXOR-XR) 75 MG 24 hr capsule; Take 1 capsule (75 mg) by mouth daily Take with the 150 for a total of 225 mg daily.    Anxiety state  -     traZODone (DESYREL) 100 MG tablet; TAKE 3-4 TABLETS BY MOUTH AT BEDTIME.  -     venlafaxine (EFFEXOR-XR) 150 MG 24 hr capsule; TAKE 1 CAPSULE BY MOUTH EVERY DAY WITH FOOD  -     venlafaxine (EFFEXOR-XR) 75 MG 24 hr capsule; Take 1 capsule (75 mg) by mouth daily Take with the 150 for a total of 225 mg daily.    Visit for screening mammogram  -     MA Screen Bilateral w/Tonny; Future    Recurrent major depression in partial remission (H)  -     traZODone (DESYREL) 100 MG tablet; TAKE 3-4 TABLETS BY MOUTH AT BEDTIME.    Seasonal allergic rhinitis due to pollen  -      fluticasone (FLONASE) 50 MCG/ACT nasal spray; INHALE 2 SPRAYS INTO BOTH NOSTRILS ONCE DAILY.    Chronic allergic rhinitis due to pollen  -     cetirizine-pseudoePHEDrine ER ([facility] ALLERGY RELIEF-D) 5-120 MG 12 hr tablet; TAKE 1 TABLET BY MOUTH TWICE A DAY    Other orders  -     FLU SHOT 6 MOS - 50 YRS (FLUZONE)      Will notify of lab results when available. Discussed diet, exercise and healthy lifestyle changes. Continue current medications. Mammogram scheduled.     Flu shot today    Lengthy discussion with regard to her multiple psychosocial stressors. Discussed the potential for adding BuSpar. Also discussed the possibility of adding low-dose Seroquel or Abilify. Discussed referral to psychiatry. Discussed referral for counseling. After discussion, we elected to employ expectant management but she assures me that she will let me know if she wants to move ahead with any of these things. It sounds as if family dynamics are playing a large role here and I really think counseling probably would be the most beneficial. Support and encouragement offered.    Rashad Adhikari MD            Answers for HPI/ROS submitted by the patient on 9/23/2021  If you checked off any problems, how difficult have these problems made it for you to do your work, take care of things at home, or get along with other people?: Somewhat difficult  PHQ9 TOTAL SCORE: 15  ERMELINDA 7 TOTAL SCORE: 16

## 2021-09-24 ASSESSMENT — ANXIETY QUESTIONNAIRES: GAD7 TOTAL SCORE: 16

## 2022-02-19 ENCOUNTER — HEALTH MAINTENANCE LETTER (OUTPATIENT)
Age: 47
End: 2022-02-19

## 2022-03-02 DIAGNOSIS — J30.1 CHRONIC ALLERGIC RHINITIS DUE TO POLLEN: ICD-10-CM

## 2022-03-02 RX ORDER — CETIRIZINE HYDROCHLORIDE, PSEUDOEPHEDRINE HYDROCHLORIDE 5; 120 MG/1; MG/1
TABLET, FILM COATED, EXTENDED RELEASE ORAL
Qty: 60 TABLET | Refills: 2 | Status: SHIPPED | OUTPATIENT
Start: 2022-03-02 | End: 2022-09-15

## 2022-03-02 NOTE — TELEPHONE ENCOUNTER
[facility] CETIRIZINE-D TABLET      Last Written Prescription Date:  9/23/21  Last Fill Quantity: 60,   # refills: 3  Last Office Visit: 9/23/21  Future Office visit:       Routing refill request to provider for review/approval because:  Drug not on the [facility] or [facility] refill protocol or controlled substance. Unable to complete prescription refill per RNMedication Refill Policy.................... Jyoti Parra RN ....................  3/2/2022   11:53 AM

## 2022-09-15 DIAGNOSIS — J30.1 CHRONIC ALLERGIC RHINITIS DUE TO POLLEN: ICD-10-CM

## 2022-09-15 RX ORDER — CETIRIZINE HYDROCHLORIDE, PSEUDOEPHEDRINE HYDROCHLORIDE 5; 120 MG/1; MG/1
TABLET, FILM COATED, EXTENDED RELEASE ORAL
Qty: 72 TABLET | Refills: 5 | Status: SHIPPED | OUTPATIENT
Start: 2022-09-15 | End: 2022-10-24

## 2022-09-15 NOTE — TELEPHONE ENCOUNTER
[facility] sent Rx request for the following:   ZYRTEC-D 12 HOUR TABLETS OTC   Last Prescription Date:   3/2/22  Last Fill Qty/Refills:         60, R-2    Last Office Visit:              9/23/21   Future Office visit:           10/24/22  Pina Cadet RN on 9/15/2022 at 2:36 PM

## 2022-10-16 ENCOUNTER — HEALTH MAINTENANCE LETTER (OUTPATIENT)
Age: 47
End: 2022-10-16

## 2022-10-24 ENCOUNTER — OFFICE VISIT (OUTPATIENT)
Dept: FAMILY MEDICINE | Facility: OTHER | Age: 47
End: 2022-10-24
Attending: FAMILY MEDICINE
Payer: COMMERCIAL

## 2022-10-24 VITALS
OXYGEN SATURATION: 97 % | DIASTOLIC BLOOD PRESSURE: 78 MMHG | RESPIRATION RATE: 16 BRPM | HEART RATE: 89 BPM | TEMPERATURE: 98.2 F | WEIGHT: 168.4 LBS | SYSTOLIC BLOOD PRESSURE: 104 MMHG | BODY MASS INDEX: 31.79 KG/M2 | HEIGHT: 61 IN

## 2022-10-24 DIAGNOSIS — K58.9 IRRITABLE BOWEL SYNDROME, UNSPECIFIED TYPE: ICD-10-CM

## 2022-10-24 DIAGNOSIS — Z80.0 FH: COLON CANCER: ICD-10-CM

## 2022-10-24 DIAGNOSIS — Z86.0101 H/O ADENOMATOUS POLYP OF COLON: ICD-10-CM

## 2022-10-24 DIAGNOSIS — Z13.0 SCREENING FOR DEFICIENCY ANEMIA: ICD-10-CM

## 2022-10-24 DIAGNOSIS — Z00.00 VISIT FOR PREVENTIVE HEALTH EXAMINATION: Primary | ICD-10-CM

## 2022-10-24 DIAGNOSIS — Z13.29 SCREENING FOR HYPOTHYROIDISM: ICD-10-CM

## 2022-10-24 DIAGNOSIS — J30.1 SEASONAL ALLERGIC RHINITIS DUE TO POLLEN: ICD-10-CM

## 2022-10-24 DIAGNOSIS — Z12.31 VISIT FOR SCREENING MAMMOGRAM: ICD-10-CM

## 2022-10-24 DIAGNOSIS — J30.1 CHRONIC ALLERGIC RHINITIS DUE TO POLLEN: ICD-10-CM

## 2022-10-24 DIAGNOSIS — Z23 HIGH PRIORITY FOR 2019-NCOV VACCINE: ICD-10-CM

## 2022-10-24 DIAGNOSIS — F41.1 ANXIETY STATE: ICD-10-CM

## 2022-10-24 DIAGNOSIS — J32.9 CHRONIC SINUSITIS, UNSPECIFIED LOCATION: ICD-10-CM

## 2022-10-24 DIAGNOSIS — F33.41 DEPRESSION, MAJOR, RECURRENT, IN PARTIAL REMISSION (H): ICD-10-CM

## 2022-10-24 DIAGNOSIS — F33.41 RECURRENT MAJOR DEPRESSION IN PARTIAL REMISSION (H): ICD-10-CM

## 2022-10-24 DIAGNOSIS — Z13.220 SCREENING FOR HYPERLIPIDEMIA: ICD-10-CM

## 2022-10-24 DIAGNOSIS — Z13.228 SCREENING FOR METABOLIC DISORDER: ICD-10-CM

## 2022-10-24 LAB
ALBUMIN SERPL-MCNC: 4.7 G/DL (ref 3.5–5.7)
ALP SERPL-CCNC: 68 U/L (ref 34–104)
ALT SERPL W P-5'-P-CCNC: 24 U/L (ref 7–52)
ANION GAP SERPL CALCULATED.3IONS-SCNC: 9 MMOL/L (ref 3–14)
AST SERPL W P-5'-P-CCNC: 31 U/L (ref 13–39)
BASOPHILS # BLD AUTO: 0.1 10E3/UL (ref 0–0.2)
BASOPHILS NFR BLD AUTO: 1 %
BILIRUB SERPL-MCNC: 0.5 MG/DL (ref 0.3–1)
BUN SERPL-MCNC: 8 MG/DL (ref 7–25)
CALCIUM SERPL-MCNC: 10.1 MG/DL (ref 8.6–10.3)
CHLORIDE BLD-SCNC: 100 MMOL/L (ref 98–107)
CHOLEST SERPL-MCNC: 217 MG/DL
CO2 SERPL-SCNC: 26 MMOL/L (ref 21–31)
CREAT SERPL-MCNC: 0.93 MG/DL (ref 0.6–1.2)
EOSINOPHIL # BLD AUTO: 0.1 10E3/UL (ref 0–0.7)
EOSINOPHIL NFR BLD AUTO: 2 %
ERYTHROCYTE [DISTWIDTH] IN BLOOD BY AUTOMATED COUNT: 12.2 % (ref 10–15)
FASTING STATUS PATIENT QL REPORTED: ABNORMAL
GFR SERPL CREATININE-BSD FRML MDRD: 75 ML/MIN/1.73M2
GLUCOSE BLD-MCNC: 93 MG/DL (ref 70–105)
HCT VFR BLD AUTO: 39.6 % (ref 35–47)
HDLC SERPL-MCNC: 53 MG/DL (ref 23–92)
HGB BLD-MCNC: 14 G/DL (ref 11.7–15.7)
IMM GRANULOCYTES # BLD: 0 10E3/UL
IMM GRANULOCYTES NFR BLD: 0 %
LDLC SERPL CALC-MCNC: 133 MG/DL
LYMPHOCYTES # BLD AUTO: 2.1 10E3/UL (ref 0.8–5.3)
LYMPHOCYTES NFR BLD AUTO: 35 %
MCH RBC QN AUTO: 33 PG (ref 26.5–33)
MCHC RBC AUTO-ENTMCNC: 35.4 G/DL (ref 31.5–36.5)
MCV RBC AUTO: 93 FL (ref 78–100)
MONOCYTES # BLD AUTO: 0.4 10E3/UL (ref 0–1.3)
MONOCYTES NFR BLD AUTO: 7 %
NEUTROPHILS # BLD AUTO: 3.3 10E3/UL (ref 1.6–8.3)
NEUTROPHILS NFR BLD AUTO: 55 %
NONHDLC SERPL-MCNC: 164 MG/DL
NRBC # BLD AUTO: 0 10E3/UL
NRBC BLD AUTO-RTO: 0 /100
PLATELET # BLD AUTO: 273 10E3/UL (ref 150–450)
POTASSIUM BLD-SCNC: 3.9 MMOL/L (ref 3.5–5.1)
PROT SERPL-MCNC: 7.3 G/DL (ref 6.4–8.9)
RBC # BLD AUTO: 4.24 10E6/UL (ref 3.8–5.2)
SODIUM SERPL-SCNC: 135 MMOL/L (ref 134–144)
TRIGL SERPL-MCNC: 157 MG/DL
TSH SERPL DL<=0.005 MIU/L-ACNC: 1.64 MU/L (ref 0.4–4)
WBC # BLD AUTO: 5.9 10E3/UL (ref 4–11)

## 2022-10-24 PROCEDURE — 82040 ASSAY OF SERUM ALBUMIN: CPT | Mod: ZL | Performed by: FAMILY MEDICINE

## 2022-10-24 PROCEDURE — 90686 IIV4 VACC NO PRSV 0.5 ML IM: CPT | Performed by: FAMILY MEDICINE

## 2022-10-24 PROCEDURE — 84443 ASSAY THYROID STIM HORMONE: CPT | Mod: ZL | Performed by: FAMILY MEDICINE

## 2022-10-24 PROCEDURE — 85004 AUTOMATED DIFF WBC COUNT: CPT | Mod: ZL | Performed by: FAMILY MEDICINE

## 2022-10-24 PROCEDURE — 91312 COVID-19,PF,PFIZER BOOSTER BIVALENT: CPT | Performed by: FAMILY MEDICINE

## 2022-10-24 PROCEDURE — 80061 LIPID PANEL: CPT | Mod: ZL | Performed by: FAMILY MEDICINE

## 2022-10-24 PROCEDURE — 99396 PREV VISIT EST AGE 40-64: CPT | Mod: 25 | Performed by: FAMILY MEDICINE

## 2022-10-24 PROCEDURE — 36415 COLL VENOUS BLD VENIPUNCTURE: CPT | Mod: ZL | Performed by: FAMILY MEDICINE

## 2022-10-24 PROCEDURE — 90471 IMMUNIZATION ADMIN: CPT | Performed by: FAMILY MEDICINE

## 2022-10-24 PROCEDURE — 0124A COVID-19,PF,PFIZER BOOSTER BIVALENT: CPT | Performed by: FAMILY MEDICINE

## 2022-10-24 PROCEDURE — 80053 COMPREHEN METABOLIC PANEL: CPT | Mod: ZL | Performed by: FAMILY MEDICINE

## 2022-10-24 RX ORDER — VENLAFAXINE HYDROCHLORIDE 75 MG/1
75 CAPSULE, EXTENDED RELEASE ORAL DAILY
Qty: 90 CAPSULE | Refills: 11 | Status: ON HOLD | OUTPATIENT
Start: 2022-10-24 | End: 2023-10-26

## 2022-10-24 RX ORDER — CETIRIZINE HYDROCHLORIDE, PSEUDOEPHEDRINE HYDROCHLORIDE 5; 120 MG/1; MG/1
TABLET, FILM COATED, EXTENDED RELEASE ORAL
Qty: 72 TABLET | Refills: 5 | Status: SHIPPED | OUTPATIENT
Start: 2022-10-24 | End: 2023-04-05

## 2022-10-24 RX ORDER — AZITHROMYCIN 250 MG/1
TABLET, FILM COATED ORAL
Qty: 6 TABLET | Refills: 0 | Status: SHIPPED | OUTPATIENT
Start: 2022-10-24 | End: 2022-10-29

## 2022-10-24 RX ORDER — VENLAFAXINE HYDROCHLORIDE 150 MG/1
CAPSULE, EXTENDED RELEASE ORAL
Qty: 90 CAPSULE | Refills: 11 | Status: SHIPPED | OUTPATIENT
Start: 2022-10-24 | End: 2023-10-04 | Stop reason: ALTCHOICE

## 2022-10-24 RX ORDER — FLUTICASONE PROPIONATE 50 MCG
SPRAY, SUSPENSION (ML) NASAL
Qty: 48 ML | Refills: 11 | Status: SHIPPED | OUTPATIENT
Start: 2022-10-24 | End: 2023-10-04

## 2022-10-24 RX ORDER — TRAZODONE HYDROCHLORIDE 100 MG/1
TABLET ORAL
Qty: 360 TABLET | Refills: 11 | Status: SHIPPED | OUTPATIENT
Start: 2022-10-24 | End: 2023-10-04

## 2022-10-24 ASSESSMENT — ENCOUNTER SYMPTOMS
FEVER: 1
HEADACHES: 1
EYE PAIN: 1
DIZZINESS: 1
CHILLS: 1
BREAST MASS: 0

## 2022-10-24 ASSESSMENT — ANXIETY QUESTIONNAIRES
6. BECOMING EASILY ANNOYED OR IRRITABLE: SEVERAL DAYS
3. WORRYING TOO MUCH ABOUT DIFFERENT THINGS: SEVERAL DAYS
1. FEELING NERVOUS, ANXIOUS, OR ON EDGE: SEVERAL DAYS
7. FEELING AFRAID AS IF SOMETHING AWFUL MIGHT HAPPEN: NOT AT ALL
2. NOT BEING ABLE TO STOP OR CONTROL WORRYING: NOT AT ALL
5. BEING SO RESTLESS THAT IT IS HARD TO SIT STILL: NOT AT ALL
GAD7 TOTAL SCORE: 3
IF YOU CHECKED OFF ANY PROBLEMS ON THIS QUESTIONNAIRE, HOW DIFFICULT HAVE THESE PROBLEMS MADE IT FOR YOU TO DO YOUR WORK, TAKE CARE OF THINGS AT HOME, OR GET ALONG WITH OTHER PEOPLE: NOT DIFFICULT AT ALL
GAD7 TOTAL SCORE: 3
7. FEELING AFRAID AS IF SOMETHING AWFUL MIGHT HAPPEN: NOT AT ALL
4. TROUBLE RELAXING: NOT AT ALL
GAD7 TOTAL SCORE: 3
8. IF YOU CHECKED OFF ANY PROBLEMS, HOW DIFFICULT HAVE THESE MADE IT FOR YOU TO DO YOUR WORK, TAKE CARE OF THINGS AT HOME, OR GET ALONG WITH OTHER PEOPLE?: NOT DIFFICULT AT ALL

## 2022-10-24 ASSESSMENT — PATIENT HEALTH QUESTIONNAIRE - PHQ9
SUM OF ALL RESPONSES TO PHQ QUESTIONS 1-9: 3
SUM OF ALL RESPONSES TO PHQ QUESTIONS 1-9: 3
10. IF YOU CHECKED OFF ANY PROBLEMS, HOW DIFFICULT HAVE THESE PROBLEMS MADE IT FOR YOU TO DO YOUR WORK, TAKE CARE OF THINGS AT HOME, OR GET ALONG WITH OTHER PEOPLE: NOT DIFFICULT AT ALL

## 2022-10-24 ASSESSMENT — PAIN SCALES - GENERAL: PAINLEVEL: NO PAIN (0)

## 2022-10-24 NOTE — PROGRESS NOTES
SUBJECTIVE:   CC: Radha is an 47 year old who presents for preventive health visit.       Patient has been advised of split billing requirements and indicates understanding: Yes  Healthy Habits:     Getting at least 3 servings of Calcium per day:  Yes    Bi-annual eye exam:  Yes    Dental care twice a year:  Yes    Sleep apnea or symptoms of sleep apnea:  Daytime drowsiness    Diet:  Regular (no restrictions)    Frequency of exercise:  6-7 days/week    Duration of exercise:  45-60 minutes    Taking medications regularly:  Yes    Medication side effects:  None    PHQ-2 Total Score: 1    Additional concerns today:  No            Today's PHQ-2 Score:   PHQ-2 ( 1999 Pfizer) 10/24/2022   Q1: Little interest or pleasure in doing things 1   Q2: Feeling down, depressed or hopeless 0   PHQ-2 Score 1   Q1: Little interest or pleasure in doing things Several days   Q2: Feeling down, depressed or hopeless Not at all   PHQ-2 Score 1       Abuse: Current or Past (Physical, Sexual or Emotional) - No  Do you feel safe in your environment? Yes    Have you ever done Advance Care Planning? (For example, a Health Directive, POLST, or a discussion with a medical provider or your loved ones about your wishes): No, advance care planning information given to patient to review.  Patient declined advance care planning discussion at this time.    Social History     Tobacco Use     Smoking status: Every Day     Packs/day: 0.13     Years: 30.00     Pack years: 3.90     Types: Cigarettes     Smokeless tobacco: Never   Substance Use Topics     Alcohol use: Yes     Comment: Alcoholic Drinks/day: vodka 4-5 times a week     If you drink alcohol do you typically have >3 drinks per day or >7 drinks per week? No    Alcohol Use 10/24/2022   Prescreen: >3 drinks/day or >7 drinks/week? No       Reviewed orders with patient.  Reviewed health maintenance and updated orders accordingly - YES  Labs reviewed in EPIC    Breast Cancer Screening:  Any new  "diagnosis of family breast, ovarian, or bowel cancer? No    FHS-7: No flowsheet data found.      Pertinent mammograms are reviewed under the imaging tab.    History of abnormal Pap smear: Status post benign hysterectomy. Health Maintenance and Surgical History updated.     Reviewed and updated as needed this visit by clinical staff   Tobacco   Meds   Med Hx  Surg Hx  Fam Hx  Soc Hx        Reviewed and updated as needed this visit by Provider                     Review of Systems       OBJECTIVE:   /78   Pulse 89   Temp 98.2  F (36.8  C) (Temporal)   Resp 16   Ht 1.543 m (5' 0.75\")   Wt 76.4 kg (168 lb 6.4 oz)   SpO2 97%   Breastfeeding No   BMI 32.08 kg/m    Physical Exam          ASSESSMENT/PLAN:   Radha was seen today for physical and imm/inj.    Diagnoses and all orders for this visit:    Visit for preventive health examination    Chronic allergic rhinitis due to pollen  -     cetirizine-pseudoePHEDrine ER (ZYRTEC-D ALLERGY & CONGESTION) 5-120 MG 12 hr tablet; TAKE 1 TABLET BY MOUTH TWICE DAILY  -     Adult ENT  Referral; Future  -     azithromycin (ZITHROMAX) 250 MG tablet; Take 2 tablets (500 mg) by mouth daily for 1 day, THEN 1 tablet (250 mg) daily for 4 days.    Seasonal allergic rhinitis due to pollen  -     fluticasone (FLONASE) 50 MCG/ACT nasal spray; INHALE 2 SPRAYS INTO BOTH NOSTRILS ONCE DAILY.    Irritable bowel syndrome, unspecified type  -     linaclotide (LINZESS) 72 MCG capsule; Take 1 capsule (72 mcg) by mouth daily    Anxiety state  -     traZODone (DESYREL) 100 MG tablet; TAKE 3-4 TABLETS BY MOUTH AT BEDTIME.  -     venlafaxine (EFFEXOR XR) 150 MG 24 hr capsule; TAKE 1 CAPSULE BY MOUTH EVERY DAY WITH FOOD  -     venlafaxine (EFFEXOR XR) 75 MG 24 hr capsule; Take 1 capsule (75 mg) by mouth daily Take with the 150 for a total of 225 mg daily.    Recurrent major depression in partial remission (H)  -     traZODone (DESYREL) 100 MG tablet; TAKE 3-4 TABLETS BY MOUTH AT " "BEDTIME.    Depression, major, recurrent, in partial remission (H)  -     venlafaxine (EFFEXOR XR) 150 MG 24 hr capsule; TAKE 1 CAPSULE BY MOUTH EVERY DAY WITH FOOD  -     venlafaxine (EFFEXOR XR) 75 MG 24 hr capsule; Take 1 capsule (75 mg) by mouth daily Take with the 150 for a total of 225 mg daily.    Screening for deficiency anemia  -     CBC with Platelets & Differential; Future  -     CBC with Platelets & Differential    Screening for metabolic disorder  -     Comprehensive metabolic panel; Future  -     Comprehensive metabolic panel    Screening for hyperlipidemia  -     Lipid Profile; Future  -     Lipid Profile    Screening for hypothyroidism  -     TSH with free T4 reflex; Future  -     TSH with free T4 reflex    FH: colon cancer    H/O adenomatous polyp of colon    Visit for screening mammogram  -     MA Screening Bilateral w/ Tonny; Future    Chronic sinusitis, unspecified location  -     CT Sinus w/o Contrast; Future  -     Adult ENT  Referral; Future    High priority for 2019-nCoV vaccine  -     COVID-19,PF,PFIZER BOOSTER BIVALENT 12+Yrs    Other orders  -     FLU SHOT 6 MOS - 50 YRS (FLUZONE)              COUNSELING:  Reviewed preventive health counseling, as reflected in patient instructions       Regular exercise       Healthy diet/nutrition    Estimated body mass index is 32.08 kg/m  as calculated from the following:    Height as of this encounter: 1.543 m (5' 0.75\").    Weight as of this encounter: 76.4 kg (168 lb 6.4 oz).    Weight management plan: Discussed healthy diet and exercise guidelines    She reports that she has been smoking cigarettes. She has a 3.90 pack-year smoking history. She has never used smokeless tobacco.  Nicotine/Tobacco Cessation Plan:   Information offered: Patient not interested at this time      Counseling Resources:  ATP IV Guidelines  Pooled Cohorts Equation Calculator  Breast Cancer Risk Calculator  BRCA-Related Cancer Risk Assessment: FHS-7 Tool  FRAX Risk " Assessment  ICSI Preventive Guidelines  Dietary Guidelines for Americans, 2010  [employer]'s MyPlate  ASA Prophylaxis  Lung CA Screening    Rashad Adhikari MD  [facility]  Answers for HPI/ROS submitted by the patient on 10/24/2022  If you checked off any problems, how difficult have these problems made it for you to do your work, take care of things at home, or get along with other people?: Not difficult at all  PHQ9 TOTAL SCORE: 3  ERMELINDA 7 TOTAL SCORE: 3

## 2022-10-24 NOTE — PROGRESS NOTES
"Nursing Notes:   Leydi Smart LPN  10/24/2022  8:24 AM  Signed  Chief Complaint   Patient presents with     Physical       Initial /78   Pulse 89   Temp 98.2  F (36.8  C) (Temporal)   Resp 16   Ht 1.543 m (5' 0.75\")   Wt 76.4 kg (168 lb 6.4 oz)   SpO2 97%   Breastfeeding No   BMI 32.08 kg/m   Estimated body mass index is 32.08 kg/m  as calculated from the following:    Height as of this encounter: 1.543 m (5' 0.75\").    Weight as of this encounter: 76.4 kg (168 lb 6.4 oz).  Medication Reconciliation: complete    FOOD SECURITY SCREENING QUESTIONS  Hunger Vital Signs:  Within the past 12 months we worried whether our food would run out before we got money to buy more. Never  Within the past 12 months the food we bought just didn't last and we didn't have money to get more. Never        Advance care directive on file? no  Advance care directive provided to patient? declined     Leydi Smart LPN      SUBJECTIVE:  Radha Ruiz  is a 47 year old female who comes in today for complete evaluation.  She is about 2 years out from a lap right colectomy for 2 cm sessile serrated adenoma with a family history of colon cancer and follow-up colonoscopy is recommended in 2023.    She continues on Effexor  mg daily and trazodone at bedtime for depression.  Last year, we discussed the possibility of adding some BuSpar or referring her for counseling but elected to employ expectant management and keep things the same.  She seems to be doing reasonably well.  PHQ 8/3/2020 9/23/2021 10/24/2022   PHQ-9 Total Score 15 15 3   Q9: Thoughts of better off dead/self-harm past 2 weeks Not at all Not at all Not at all     ERMELINDA-7 SCORE 8/3/2020 9/23/2021 10/24/2022   Total Score - 16 (severe anxiety) 3 (minimal anxiety)   Total Score 14 16 3       She continues on Linzess for irritable bowel syndrome which seems to be working reasonably well.    She is due for mammogram.    She is having some sinus issues. She has " had some slight headache and low dose temp.  She has allergies and has been irrigating and using Zyrtec D and Flonase and breathe-rite strips.  She thinks her allergies are a trigger. She gets clear discharge with irrigation. She has a lot of post nasal drainage. She has put up with this for 2 months. She is very tired, but is sleeping well. She continues to smoke.    They are getting all their ducts and vents cleaned and installing an air filter system.    She just got back from [place] to visit her sister. She got to see some of her niece's field hockey and lacrosse.  Her nephew has Duschenne's and is still walking some.     She is doing cardio and resistance exercises.  She is doing a Tonal machine that she uses regularly. She has been using it consistently for the last several months.     She is vaccinated and boosted for COVID-19 but has not had the new booster.  She has not had her flu shot.  Boostrix is up-to-date.    Past Medical, Family, and Social History reviewed and updated as noted below.   ROS is negative except as noted above       Allergies   Allergen Reactions     Sulfa Drugs Hives   ,   Family History   Problem Relation Age of Onset     Colon Cancer Father 38        Cancer-colon,Colon cancer     Other - See Comments Mother         Psychiatric illness,Untreated anxiety/D&C for postmenopausal bleeding benign     Cancer Maternal Grandfather         Cancer,Brain     Heart Disease Maternal Grandfather         Heart Disease,MI     Hypertension Maternal Grandfather         Hypertension     Other - See Comments Maternal Uncle         Hemochromatosis     Hypertension Maternal Grandmother         Hypertension     Other - See Comments Maternal Grandmother         Alzheimer's     Diabetes Other         Diabetes,Diabetes     Heart Disease Paternal Grandmother         Heart Disease,CHF     Cancer Maternal Uncle         Cancer,cancer all over.     Cancer Paternal Uncle         Cancer,Lung cancer     Breast  Cancer No family hx of         Cancer-breast   ,   Current Outpatient Medications   Medication     azithromycin (ZITHROMAX) 250 MG tablet     cetirizine-pseudoePHEDrine ER (ZYRTEC-D ALLERGY & CONGESTION) 5-120 MG 12 hr tablet     fluticasone (FLONASE) 50 MCG/ACT nasal spray     linaclotide (LINZESS) 72 MCG capsule     traZODone (DESYREL) 100 MG tablet     venlafaxine (EFFEXOR XR) 150 MG 24 hr capsule     venlafaxine (EFFEXOR XR) 75 MG 24 hr capsule     Vitamin D, Cholecalciferol, 25 MCG (1000 UT) CAPS     No current facility-administered medications for this visit.   ,   Past Medical History:   Diagnosis Date     Encounter for gynecological examination without abnormal finding     08,Satisfactory GYN examination     Other pulmonary embolism without acute cor pulmonale (H)     History of pulmonary emboli after      Personal history of other medical treatment (CODE)      2, para 1-0-1-1     Personal history of other medical treatment (CODE)     10/00,History of blood transfusion with    ,   Patient Active Problem List    Diagnosis Date Noted     Polyp of ascending colon, unspecified type 10/02/2020     Priority: Medium     Added automatically from request for surgery [id number]       Tobacco abuse 2018     Priority: Medium     Chronic allergic rhinitis due to pollen, unspecified seasonality 2018     Priority: Medium     Anxiety state 2018     Priority: Medium     Constipation 2018     Priority: Medium     Migraine headache 2018     Priority: Medium     Restless leg syndrome 2018     Priority: Medium     Surgical menopause 2015     Priority: Medium     H/O adenomatous polyp of colon 10/06/2014     Priority: Medium     Melanosis coli 10/06/2014     Priority: Medium     FH: colon cancer 2014     Priority: Medium     Tinea versicolor 2012     Priority: Medium     Depression, major, recurrent, in partial remission (H) 2007     Priority:  "Medium     Overview:   PHQ-9 score 19-3 on 07.     ,   Past Surgical History:   Procedure Laterality Date     ant/post colporr w enterocele incl cysturethroscopy  2017    Dr. Mc      SECTION      10/00 /06,History of blood transfusion with      COLONOSCOPY      ,,,F/U 2019     COLONOSCOPY N/A 2020    Procedure: COLONOSCOPY, WITH POLYPECTOMY AND BIOPSY;  Surgeon: Rachel Acevedo MD;  Location: [facility] OR     COLONOSCOPY N/A 10/12/2020    1 sessile serrated, 1 tubular - f/u 3 years     Cryotherapy of Cervix       LAPAROSCOPIC ASSISTED HYSTERECTOMY VAGINAL, BILATERAL SALPINGO-OOPHORECTOMY, COMBINED      Ney Hart MD [facility]     LAPAROSCOPIC TUBAL LIGATION          and   Social History     Tobacco Use     Smoking status: Every Day     Packs/day: 0.13     Years: 30.00     Pack years: 3.90     Types: Cigarettes     Smokeless tobacco: Never   Substance Use Topics     Alcohol use: Yes     Comment: Alcoholic Drinks/day: vodka 4-5 times a week     OBJECTIVE:  /78   Pulse 89   Temp 98.2  F (36.8  C) (Temporal)   Resp 16   Ht 1.543 m (5' 0.75\")   Wt 76.4 kg (168 lb 6.4 oz)   SpO2 97%   Breastfeeding No   BMI 32.08 kg/m     EXAM:  General Appearance: Pleasant, alert, appropriate appearance for age. No acute distress  Head Exam: Normal. Normocephalic, atraumatic.  Eye Exam: PERRLA, EOMI, conjunctivae, sclerae normal.  Ear Exam: Normal TM's bilaterally. Normal auditory canals and external ears. Non-tender.  Nose Exam: Normal external nose, mucus membranes, and septum.  Slight nasal septal deviation to the left.  Sinuses are mildly tender to palpation and transilluminate only fair.  OroPharynx Exam:  Dental hygiene adequate. Normal buccal mucosa. Normal pharynx.  Neck Exam:  Supple, no masses or nodes. No bruits  Thyroid Exam: No nodules or enlargement.  Chest/Respiratory Exam: Normal chest wall and respirations. Clear to auscultation.  Breast Exam: No " dimpling, nipple retraction or discharge. No masses or nodes.  Cardiovascular Exam: Regular rate and rhythm. S1, S2, no murmur, click, gallop, or rubs.  Gastrointestinal Exam: Soft, non-tender, no masses or organomegaly.  Lymphatic Exam: Non-palpable nodes in neck,clavicular, axillary, or inguinal regions.  Musculoskeletal Exam: Back is straight and non-tender, full ROM of upper and lower extremities.  Foot Exam: Left and right foot: good pedal pulses  Skin: no rash or abnormalities  Neurologic Exam:  normal gross motor, tone coordination and no tremor.  Psychiatric Exam: Alert and oriented - appropriate affect.     Results for orders placed or performed in visit on 10/24/22   Comprehensive metabolic panel     Status: Normal   Result Value Ref Range    Sodium 135 134 - 144 mmol/L    Potassium 3.9 3.5 - 5.1 mmol/L    Chloride 100 98 - 107 mmol/L    Carbon Dioxide (CO2) 26 21 - 31 mmol/L    Anion Gap 9 3 - 14 mmol/L    Urea Nitrogen 8 7 - 25 mg/dL    Creatinine 0.93 0.60 - 1.20 mg/dL    Calcium 10.1 8.6 - 10.3 mg/dL    Glucose 93 70 - 105 mg/dL    Alkaline Phosphatase 68 34 - 104 U/L    AST 31 13 - 39 U/L    ALT 24 7 - 52 U/L    Protein Total 7.3 6.4 - 8.9 g/dL    Albumin 4.7 3.5 - 5.7 g/dL    Bilirubin Total 0.5 0.3 - 1.0 mg/dL    GFR Estimate 75 >60 mL/min/1.73m2   Lipid Profile     Status: Abnormal   Result Value Ref Range    Cholesterol 217 (H) <200 mg/dL    Triglycerides 157 (H) <150 mg/dL    Direct Measure HDL 53 23 - 92 mg/dL    LDL Cholesterol Calculated 133 (H) <=100 mg/dL    Non HDL Cholesterol 164 (H) <130 mg/dL    Patient Fasting > 8hrs? Unknown     Narrative    Cholesterol  Desirable:  <200 mg/dL    Triglycerides  Normal:  Less than 150 mg/dL  Borderline High:  150-199 mg/dL  High:  200-499 mg/dL  Very High:  Greater than or equal to 500 mg/dL    Direct Measure HDL  Female:  Greater than or equal to 50 mg/dL   Male:  Greater than or equal to 40 mg/dL    LDL Cholesterol  Desirable:  <100mg/dL  Above  Desirable:  100-129 mg/dL   Borderline High:  130-159 mg/dL   High:  160-189 mg/dL   Very High:  >= 190 mg/dL    Non HDL Cholesterol  Desirable:  130 mg/dL  Above Desirable:  130-159 mg/dL  Borderline High:  160-189 mg/dL  High:  190-219 mg/dL  Very High:  Greater than or equal to 220 mg/dL   TSH with free T4 reflex     Status: Normal   Result Value Ref Range    TSH 1.64 0.40 - 4.00 mU/L   CBC with platelets and differential     Status: None   Result Value Ref Range    WBC Count 5.9 4.0 - 11.0 10e3/uL    RBC Count 4.24 3.80 - 5.20 10e6/uL    Hemoglobin 14.0 11.7 - 15.7 g/dL    Hematocrit 39.6 35.0 - 47.0 %    MCV 93 78 - 100 fL    MCH 33.0 26.5 - 33.0 pg    MCHC 35.4 31.5 - 36.5 g/dL    RDW 12.2 10.0 - 15.0 %    Platelet Count 273 150 - 450 10e3/uL    % Neutrophils 55 %    % Lymphocytes 35 %    % Monocytes 7 %    % Eosinophils 2 %    % Basophils 1 %    % Immature Granulocytes 0 %    NRBCs per 100 WBC 0 <1 /100    Absolute Neutrophils 3.3 1.6 - 8.3 10e3/uL    Absolute Lymphocytes 2.1 0.8 - 5.3 10e3/uL    Absolute Monocytes 0.4 0.0 - 1.3 10e3/uL    Absolute Eosinophils 0.1 0.0 - 0.7 10e3/uL    Absolute Basophils 0.1 0.0 - 0.2 10e3/uL    Absolute Immature Granulocytes 0.0 <=0.4 10e3/uL    Absolute NRBCs 0.0 10e3/uL   CBC with Platelets & Differential     Status: None    Narrative    The following orders were created for panel order CBC with Platelets & Differential.  Procedure                               Abnormality         Status                     ---------                               -----------         ------                     CBC with platelets and d...[[id number]]                      Final result                 Please view results for these tests on the individual orders.      ASSESSMENT/Plan :    Radha was seen today for physical and imm/inj.    Diagnoses and all orders for this visit:    Visit for preventive health examination    Chronic allergic rhinitis due to pollen  -     cetirizine-pseudoePHEDrine  ER (ZYRTEC-D ALLERGY & CONGESTION) 5-120 MG 12 hr tablet; TAKE 1 TABLET BY MOUTH TWICE DAILY  -     Adult ENT  Referral; Future  -     azithromycin (ZITHROMAX) 250 MG tablet; Take 2 tablets (500 mg) by mouth daily for 1 day, THEN 1 tablet (250 mg) daily for 4 days.    Seasonal allergic rhinitis due to pollen  -     fluticasone (FLONASE) 50 MCG/ACT nasal spray; INHALE 2 SPRAYS INTO BOTH NOSTRILS ONCE DAILY.    Irritable bowel syndrome, unspecified type  -     linaclotide (LINZESS) 72 MCG capsule; Take 1 capsule (72 mcg) by mouth daily    Anxiety state  -     traZODone (DESYREL) 100 MG tablet; TAKE 3-4 TABLETS BY MOUTH AT BEDTIME.  -     venlafaxine (EFFEXOR XR) 150 MG 24 hr capsule; TAKE 1 CAPSULE BY MOUTH EVERY DAY WITH FOOD  -     venlafaxine (EFFEXOR XR) 75 MG 24 hr capsule; Take 1 capsule (75 mg) by mouth daily Take with the 150 for a total of 225 mg daily.    Recurrent major depression in partial remission (H)  -     traZODone (DESYREL) 100 MG tablet; TAKE 3-4 TABLETS BY MOUTH AT BEDTIME.    Depression, major, recurrent, in partial remission (H)  -     venlafaxine (EFFEXOR XR) 150 MG 24 hr capsule; TAKE 1 CAPSULE BY MOUTH EVERY DAY WITH FOOD  -     venlafaxine (EFFEXOR XR) 75 MG 24 hr capsule; Take 1 capsule (75 mg) by mouth daily Take with the 150 for a total of 225 mg daily.    Screening for deficiency anemia  -     CBC with Platelets & Differential; Future  -     CBC with Platelets & Differential    Screening for metabolic disorder  -     Comprehensive metabolic panel; Future  -     Comprehensive metabolic panel    Screening for hyperlipidemia  -     Lipid Profile; Future  -     Lipid Profile    Screening for hypothyroidism  -     TSH with free T4 reflex; Future  -     TSH with free T4 reflex    FH: colon cancer    H/O adenomatous polyp of colon    Visit for screening mammogram  -     MA Screening Bilateral w/ Tonny; Future    Chronic sinusitis, unspecified location  -     CT Sinus w/o Contrast;  Future  -     Adult ENT  Referral; Future    High priority for 2019-nCoV vaccine  -     COVID-19,PF,PFIZER BOOSTER BIVALENT 12+Yrs    Other orders  -     FLU SHOT 6 MOS - 50 YRS (FLUZONE)      Will notify of lab results when available. Discussed diet, exercise and healthy lifestyle changes.  Offered referral for medical weight management she declines.    Go ahead with CT sinuses and referral to Dr. Stoddard.  Placed on Zithromax.  Continue the irrigations Flonase and Zyrtec-D.    Mammogram scheduled.  COVID booster and flu shot today.  Complete smoking cessation is encouraged.        Rashad Adhikari MD      Answers for HPI/ROS submitted by the patient on 10/24/2022  If you checked off any problems, how difficult have these problems made it for you to do your work, take care of things at home, or get along with other people?: Not difficult at all  PHQ9 TOTAL SCORE: 3  ERMELINDA 7 TOTAL SCORE: 3  Frequency of exercise:: 6-7 days/week  Getting at least 3 servings of Calcium per day:: Yes  Diet:: Regular (no restrictions)  Taking medications regularly:: Yes  Medication side effects:: None  Bi-annual eye exam:: Yes  Dental care twice a year:: Yes  Sleep apnea or symptoms of sleep apnea:: Daytime drowsiness  chills: Yes  congestion: Yes  dizziness: Yes  ear pain: Yes  eye pain: Yes  fever: Yes  headaches: Yes  pelvic pain: No  vaginal bleeding: No  vaginal discharge: No  tenderness: No  breast mass: No  breast discharge: No  Additional concerns today:: No  Duration of exercise:: 45-60 minutes

## 2022-10-24 NOTE — NURSING NOTE
"Chief Complaint   Patient presents with     Physical       Initial /78   Pulse 89   Temp 98.2  F (36.8  C) (Temporal)   Resp 16   Ht 1.543 m (5' 0.75\")   Wt 76.4 kg (168 lb 6.4 oz)   SpO2 97%   Breastfeeding No   BMI 32.08 kg/m   Estimated body mass index is 32.08 kg/m  as calculated from the following:    Height as of this encounter: 1.543 m (5' 0.75\").    Weight as of this encounter: 76.4 kg (168 lb 6.4 oz).  Medication Reconciliation: complete    FOOD SECURITY SCREENING QUESTIONS  Hunger Vital Signs:  Within the past 12 months we worried whether our food would run out before we got money to buy more. Never  Within the past 12 months the food we bought just didn't last and we didn't have money to get more. Never        Advance care directive on file? no  Advance care directive provided to patient? declined     Leydi Smart LPN  "

## 2022-11-03 ENCOUNTER — HOSPITAL ENCOUNTER (OUTPATIENT)
Dept: CT IMAGING | Facility: OTHER | Age: 47
Discharge: HOME | End: 2022-11-03
Attending: FAMILY MEDICINE | Admitting: FAMILY MEDICINE
Payer: COMMERCIAL

## 2022-11-03 DIAGNOSIS — J32.9 CHRONIC SINUSITIS, UNSPECIFIED LOCATION: ICD-10-CM

## 2022-11-03 PROCEDURE — 70486 CT MAXILLOFACIAL W/O DYE: CPT

## 2022-11-18 ENCOUNTER — PREP FOR PROCEDURE (OUTPATIENT)
Dept: OTOLARYNGOLOGY | Facility: OTHER | Age: 47
End: 2022-11-18

## 2022-11-18 ENCOUNTER — VIRTUAL VISIT (OUTPATIENT)
Dept: OTOLARYNGOLOGY | Facility: OTHER | Age: 47
End: 2022-11-18
Attending: PHYSICIAN ASSISTANT
Payer: COMMERCIAL

## 2022-11-18 VITALS
WEIGHT: 168 LBS | OXYGEN SATURATION: 100 % | HEIGHT: 61 IN | HEART RATE: 91 BPM | TEMPERATURE: 97.5 F | SYSTOLIC BLOOD PRESSURE: 106 MMHG | BODY MASS INDEX: 31.72 KG/M2 | DIASTOLIC BLOOD PRESSURE: 60 MMHG

## 2022-11-18 DIAGNOSIS — J34.3 NASAL TURBINATE HYPERTROPHY: ICD-10-CM

## 2022-11-18 DIAGNOSIS — J34.829 NASAL VALVE COLLAPSE: ICD-10-CM

## 2022-11-18 DIAGNOSIS — Z72.0 TOBACCO ABUSE: ICD-10-CM

## 2022-11-18 DIAGNOSIS — J34.2 DNS (DEVIATED NASAL SEPTUM): Primary | ICD-10-CM

## 2022-11-18 DIAGNOSIS — J34.2 DEVIATED NASAL SEPTUM: Primary | ICD-10-CM

## 2022-11-18 DIAGNOSIS — J30.2 PERENNIAL ALLERGIC RHINITIS WITH SEASONAL VARIATION: ICD-10-CM

## 2022-11-18 DIAGNOSIS — J34.89 NASAL OBSTRUCTION: ICD-10-CM

## 2022-11-18 DIAGNOSIS — J30.89 PERENNIAL ALLERGIC RHINITIS WITH SEASONAL VARIATION: ICD-10-CM

## 2022-11-18 DIAGNOSIS — J34.89 CONCHA BULLOSA: ICD-10-CM

## 2022-11-18 PROCEDURE — 99207 PR NO CHARGE LOS: CPT | Performed by: OTOLARYNGOLOGY

## 2022-11-18 PROCEDURE — 99203 OFFICE O/P NEW LOW 30 MIN: CPT | Mod: 25 | Performed by: PHYSICIAN ASSISTANT

## 2022-11-18 PROCEDURE — 31231 NASAL ENDOSCOPY DX: CPT | Mod: 52 | Performed by: PHYSICIAN ASSISTANT

## 2022-11-18 RX ORDER — BUDESONIDE 0.5 MG/2ML
INHALANT ORAL
Qty: 120 ML | Refills: 3 | Status: ON HOLD | OUTPATIENT
Start: 2022-11-18 | End: 2022-12-20

## 2022-11-18 ASSESSMENT — PAIN SCALES - GENERAL: PAINLEVEL: NO PAIN (0)

## 2022-11-18 NOTE — LETTER
11/18/2022         RE: Radha Ruiz  [id number] [facility] [id number]        Dear Colleague,    Thank you for referring your patient, Radha Ruiz, to the [facility]. Please see a copy of my visit note below.    Otolaryngology Phone Visit    Presents for a phone visit regarding chronic nasal obstruction    She has worn breathe right strips for years and with exercises as well as nasal cones  She has chronic post nasal drainage     Jena's note reviewed   Septal deviation noted and  inferior turbinate hypertrophy with NV collapse, improved with nick maneuver    No prior nasal surgery or trauma    CT sinus dated 11/3/2022 was reviewed and discussed with Radha.  There is a mid to superior septal deviation to the right and a floor deviation to the left.  The quadrangular cartilage is displaced off the maxillary crest into the left nares.  Bilateral inferior turbinate hypertrophy and kiersten more pronounced on the left      Impression/Plan  1.  Deviated nasal septum  2.  Turbinate hypertrophy  3.  Kiersten bullosa  4.  Nasal valve collapse      SNOT form mailed     Risks and complications of septoplasty, bilateral turbinate reduction and nasal valve repair were discussed include general anesthesia, bleeding, infection, septal perforation, anosmia, epiphora, CSF rhinorrhea, atrophic rhinitis, scar formation, numbness over the incision, need for additional surgery and no guarantee is made that the septum will be completely straight., implant extrusion  Understanding is expressed, all questions are answered and wishes are to proceed with surgical intervention.    Need latera  Tract available      Hx of distant DVT, will need SCDs per protocol        Phone call duration: 11 minutes, with chart review and coordination of care total time 25 minutes with chart and CT review  Start 13:55-end 14:06        Laurie Stoddard D.O.  Otolaryngology/Head and Neck  Surgery  Allergy          See completed note        Again, thank you for allowing me to participate in the care of your patient.        Sincerely,        Laurie Stoddard MD

## 2022-11-18 NOTE — PATIENT INSTRUCTIONS
Start Budesonide rinses.   Rinse 1-2 times daily  Start Qnasl 2 sprays to each nostril daily.   Follow up in surgery with Dr. Stoddard.         Thank you for allowing Jena Breen PA-C and our ENT team to participate in your care.  If your medications are too expensive, please give the nurse a call.  We can possibly change this medication.  If you have a scheduling or an appointment question please contact our Health Unit Coordinator at [phone], Ext. [phone].    ALL nursing questions or concerns can be directed to your ENT nurse at: [phone] [facility]    Instructions for Sinus Surgery    Recovery - Everyone recovers differently from a general anesthetic.  Symptoms such as fatigue, nausea, light-headedness, and sometimes a low grade fever (up to 100 degrees) are not unusual.  As your body removes the anesthetic drugs from circulation, these symptoms will resolve.  Your nose will be sore after surgery, and you may even have symptoms similar to a sinus infection with headache, congestion, and pressure.  These will resolve with healing.  For several days you may experience bloody drainage from the nose, please use the drip pad as necessary for this.  If there is persistent bleeding, please call the office during business hours or the on call ENT physician after hours.  There are no diet restrictions after sinus surgery, and you can resume your home medications.      Please do not blow your nose until 2 weeks after surgery.       Limit your activity to no strenuous activities until I see you for the first follow-up visit in approximately 2 weeks.      Medications - You were sent home with narcotic pain medication.  If you can tolerate the discomfort during your recovery by using just plain Tylenol or ibuprofen (advil), please do so.  However, do not hesitate to use the stronger pain medication if needed.  If you were sent home with an antibiotic, it is primarily used to help the healing process.  If it causes loose  bowel movements or other signs of intolerance, it is appropriate to discontinue it.  By far the most important measure you can take to speed recovery, and maximize the chances of long term success of sinus surgery is using the sinus rinses at least three time per day for the first month after surgery.       Start budesonide irrigations tomorrow.  Use 2 times daily for one month and then as directed.  Start [facility] Med saline irrigation tonight and use at least 3 times daily.   Continue twice daily budesonide irrigations and 2-3 times daily NeilMed saline irrigations for 1 month and then as directed by Dr. Stoddard    Budesonide instructions  Make the saline solution using 2 packages of salt and previously boiled or distilled water.  This will make 240 ml of saline solution.  Mix the entire vial of budesonide into the solution.   Irrigate your nose 2 times a day with the warm budesonide solution using 1 bottle between nostrils in the morning, and one bottle at night.   Use plain ansley med saline without the steroid 2-3 times during the afternoon    Perform gentle irrigation for the first week.  Starting 1 week after surgery, you can start to irrigate a bit more forcefully.  The more often you irrigate with the ansley med saline, the faster you will heal.      At 3 weeks after surgery you may restart nasal steroids if needed (flonase, nasonex, etc).      Complications - Problems related to sinus surgery almost always are detected during the operation, and special instruction will be given in that situation.  However, unexpected things can happen, and are all related to the structures around the sinus cavities.  Symptoms that should alert you to a possible problem include: severe eye pain or eye swelling, persistent heavy bleeding from the nose, and high fevers with headache and neck pain.  Any of these symptoms should be called into my office or to the on call ENT if after hours.  The most common non-emergency  complication of sinus surgery is the formation of scar tissue which can re-block the sinuses.  This is addressed below.    Follow-up -  As you have noted, there are quite a few follow-up visits after sinus surgery.  This is done to aggressively manage the most common complication of this technique, which is scar tissue blocking the sinuses.  These visits will require the examination of your nose and possibly removal of crusts of dry mucous and blood, with possible removal of early scar tissue.  Please prepare for these visits by using your sinus rinses.    If there are any questions or issues with the above, or if there are other issues that concern you, always feel free to call the clinic and I am happy to speak with you as soon as I can.    Laurie Stoddard D.O.  Otolaryngology/Head and Neck Surgery  Allergy    [phone]   extension [phone]        Budesonide nasal saline irrigation per instructions:  -Obtain [facility] Med Sinus rinse over the counter.    -Use warm distilled water and 2 packets of the salt solution that comes with the bottle, dissolve in bottle up to the 240 mL eveline.  -Add 1 vial of budesonide.  -Irrigate each side of your nose leaning over the sink, using 1/3 to 1/2 the volume of the bottle in each nostril every irrigation.  Irrigate 2 times daily.  -If additional rinses are needed/recommended, you may use the plan [facility] Med Sinus irrigation without the use of added budesonide

## 2022-11-18 NOTE — PATIENT INSTRUCTIONS
Thank you for allowing Dr. Stoddard and our ENT team to participate in your care.  If your medications are too expensive, please give the nurse a call.  We can possibly change this medication.  If you have a scheduling or an appointment question please contact our Health Unit Coordinator at their direct line [phone].   ALL nursing questions or concerns can be directed to your ENT nurse at: [phone] - Claire    Instructions for Sinus Surgery    Recovery - Everyone recovers differently from a general anesthetic.  Symptoms such as fatigue, nausea, light-headedness, and sometimes a low grade fever (up to 100 degrees) are not unusual.  As your body removes the anesthetic drugs from circulation, these symptoms will resolve.  Your nose will be sore after surgery, and you may even have symptoms similar to a sinus infection with headache, congestion, and pressure.  These will resolve with healing.  For several days you may experience bloody drainage from the nose, please use the drip pad as necessary for this.  If there is persistent bleeding, please call the office during business hours or the on call ENT physician after hours.  There are no diet restrictions after sinus surgery, and you can resume your home medications.      Please do not blow your nose until 2 weeks after surgery.       Limit your activity to no strenuous activities until I see you for the first follow-up visit in approximately 2 weeks.      Medications - You were sent home with narcotic pain medication.  If you can tolerate the discomfort during your recovery by using just plain Tylenol or ibuprofen (advil), please do so.  However, do not hesitate to use the stronger pain medication if needed.  If you were sent home with an antibiotic, it is primarily used to help the healing process.  If it causes loose bowel movements or other signs of intolerance, it is appropriate to discontinue it.  By far the most important measure you can take to speed  recovery, and maximize the chances of long term success of sinus surgery is using the sinus rinses at least three time per day for the first month after surgery.       Start budesonide irrigations tomorrow.  Use 2 times daily for one month and then as directed.  Start Ansley Med saline irrigation tonight and use at least 3 times daily.   Continue twice daily budesonide irrigations and 2-3 times daily NeilMed saline irrigations for 1 month and then as directed by Dr. Stoddard    Budesonide instructions  Make the saline solution using 2 packages of salt and previously boiled or distilled water.  This will make 240 ml of saline solution.  Mix the entire vial of budesonide into the solution.   Irrigate your nose 2 times a day with the warm budesonide solution using 1 bottle between nostrils in the morning, and one bottle at night.   Use plain ansley med saline without the steroid 2-3 times during the afternoon    Perform gentle irrigation for the first week.  Starting 1 week after surgery, you can start to irrigate a bit more forcefully.  The more often you irrigate with the ansley med saline, the faster you will heal.      At 3 weeks after surgery you may restart nasal steroids if needed (flonase, nasonex, etc).      Complications - Problems related to sinus surgery almost always are detected during the operation, and special instruction will be given in that situation.  However, unexpected things can happen, and are all related to the structures around the sinus cavities.  Symptoms that should alert you to a possible problem include: severe eye pain or eye swelling, persistent heavy bleeding from the nose, and high fevers with headache and neck pain.  Any of these symptoms should be called into my office or to the on call ENT if after hours.  The most common non-emergency complication of sinus surgery is the formation of scar tissue which can re-block the sinuses.  This is addressed below.    Follow-up -  As you have  noted, there are quite a few follow-up visits after sinus surgery.  This is done to aggressively manage the most common complication of this technique, which is scar tissue blocking the sinuses.  These visits will require the examination of your nose and possibly removal of crusts of dry mucous and blood, with possible removal of early scar tissue.  Please prepare for these visits by using your sinus rinses.    If there are any questions or issues with the above, or if there are other issues that concern you, always feel free to call the clinic and I am happy to speak with you as soon as I can.    Laurie Stoddard D.O.  Otolaryngology/Head and Neck Surgery  Allergy    [phone]   extension [phone]          HOW TO PREPARE-    You need to have a scheduled Pre-Op with your primary care physician within 30 days of your scheduled surgery.      You need a friend or family member available to drive you home AND stay with you for 24 hours after you leave the hospital. You will not be allowed to drive yourself. IF you need to take a taxi or the bus you MUST have a responsible person to ride with you. YOUR PROCEDURE WILL BE CANCELLED IF YOU DO NOT HAVE A RESPONSIBLE ADULT TO DRIVE YOU HOME.     You CANNOT have anything to eat or drink after midnight the night before your surgery, including water and coffee. Your stomach needs to be completely empty. Do NOT chew gum, suck on hard candy, or smoke. You can brush your teeth the morning of surgery.     The Surgery Education Nurses will call you  1-2 weeks prior to your surgery date at  [phone] or toll free [phone]. Please have your medication and allergy lists ready.    Stop your aspirin or other NSAIDs(Ibuprofen, Motrin, Aleve, Celebrex, Naproxen, etc...) 7 days before your surgery.    Hospital admitting will call you the day before your surgery with your exact arrival time.     Please call your primary care physician if you should become ill within 24 hours of  scheduled surgery. (ex.vomiting, diarrhea, fever)

## 2022-11-18 NOTE — PROGRESS NOTES
Otolaryngology Phone Visit    Presents for a phone visit regarding chronic nasal obstruction    She has worn breathe right strips for years and with exercises as well as nasal cones  She has chronic post nasal drainage     Jena's note reviewed   Septal deviation noted and  inferior turbinate hypertrophy with NV collapse, improved with nick maneuver    No prior nasal surgery or trauma    CT sinus dated 11/3/2022 was reviewed and discussed with Radha.  There is a mid to superior septal deviation to the right and a floor deviation to the left.  The quadrangular cartilage is displaced off the maxillary crest into the left nares.  Bilateral inferior turbinate hypertrophy and kiersten more pronounced on the left      Impression/Plan  1.  Deviated nasal septum  2.  Turbinate hypertrophy  3.  Kiersten bullosa  4.  Nasal valve collapse      SNOT form mailed     Risks and complications of septoplasty, bilateral turbinate reduction and nasal valve repair were discussed include general anesthesia, bleeding, infection, septal perforation, anosmia, epiphora, CSF rhinorrhea, atrophic rhinitis, scar formation, numbness over the incision, need for additional surgery and no guarantee is made that the septum will be completely straight., implant extrusion  Understanding is expressed, all questions are answered and wishes are to proceed with surgical intervention.    Need latera  Tract available      Hx of distant DVT, will need SCDs per protocol        Phone call duration: 11 minutes, with chart review and coordination of care total time 25 minutes with chart and CT review  Start 13:55-end 14:06        Laurie Stoddard D.O.  Otolaryngology/Head and Neck Surgery  Allergy

## 2022-11-18 NOTE — PROGRESS NOTES
Otolaryngology Consultation    Patient: Radha Ruiz  : 1975    Patient presents with:  Consult: Referred by Dr. Adhikari for chronic AR due to pollen and chronic sinusitis.      HPI:  Radha Ruiz is a 47 year old female seen today for allergy evaluation.Radha has felt ongoing concerns with nasal congestion, post nasal drainage, allergies.   She has been having symptoms worsened over the last few years. SHe feels increase in post nasal drainage and has consent sensation of PND/ throat clearing. Swelling along her face/ eyes/ dizziness. She feels increase in GI concerns related to this.   She has felt nasal blockage at times, panic sensation related to possible obstruction.     She is having some sinus issues. She has had some slight headache and low temp.  She has allergies and has been irrigating and using Zyrtec D and Flonase and breathe-rite strips.  She thinks her allergies are a trigger. She gets clear discharge with irrigation. She has a lot of post nasal drainage. She has put up with this for 2 months. She is very tired, but is sleeping well. She continues to smoke.     No nasal injury or trauma  No prior sinus surgery.   She has increase in allergies following her C section delivery/ transfusions.   Worsens with seasonal change, but year long.   No prior allergy testing.     Resides in a house with finished  basement  There is no water or mold.  Carpet in bedroom - Yes,  Heat in home- Forced air.   Animals- dog.   Family hx of allergies - None.   Past trials of medications- Flonase.   Zyrtec D.   Rinses PRN.        Intermittent drainage/ post nasal drip.  Tickle sensation.   Patient denies sore throat or throat pain  Denies chronic otalgia.   Denies fevers, night sweats or weight loss.   Denies dysphagia or dysphonia.   Denies globus sensation.     Water- adequate  Caffeine- 2 cups coffee, pop intermittent  ETOH- Social.   Tobacco- 10 cigs daily.   Reflux- None.     FINDINGS:     Frontal  sinus: The frontal sinuses are clear. The frontoethmoidal  recesses are clear.     Anterior ethmoids: The anterior ethmoid air cells are clear.     Maxillary sinuses: The maxillary sinuses are clear. The ostiomeatal  units are clear.     Posterior ethmoids: The posterior ethmoid air cells are clear.     Sphenoid sinus: The sphenoid sinus and bilateral sphenoethmoidal  recesses are clear.       There is rightward nasal septal deviation anteriorly. The nasal cavity  is clear. The lamina papyracea are intact. The roof of the ethmoids is  relatively symmetric. No chronic osseous changes of the paranasal  sinuses.     Normal retromaxillary and pterygopalatine fat. Mastoid air cells are  clear. The orbits are grossly unremarkable.                                                                      IMPRESSION:  No findings of sinusitis.     CÉSAR RIBEIRO MD        Allergies   Allergen Reactions     Sulfa Drugs Hives         Current Outpatient Rx   Medication Sig Dispense Refill     cetirizine-pseudoePHEDrine ER (ZYRTEC-D ALLERGY & CONGESTION) 5-120 MG 12 hr tablet TAKE 1 TABLET BY MOUTH TWICE DAILY 72 tablet 5     fluticasone (FLONASE) 50 MCG/ACT nasal spray INHALE 2 SPRAYS INTO BOTH NOSTRILS ONCE DAILY. 48 mL 11     linaclotide (LINZESS) 72 MCG capsule Take 1 capsule (72 mcg) by mouth daily 90 capsule 11     traZODone (DESYREL) 100 MG tablet TAKE 3-4 TABLETS BY MOUTH AT BEDTIME. 360 tablet 11     venlafaxine (EFFEXOR XR) 150 MG 24 hr capsule TAKE 1 CAPSULE BY MOUTH EVERY DAY WITH FOOD 90 capsule 11     venlafaxine (EFFEXOR XR) 75 MG 24 hr capsule Take 1 capsule (75 mg) by mouth daily Take with the 150 for a total of 225 mg daily. 90 capsule 11     Vitamin D, Cholecalciferol, 25 MCG (1000 UT) CAPS Take 10,000 Units by mouth daily         Allergies: Sulfa drugs     Past Medical History:   Diagnosis Date     Encounter for gynecological examination without abnormal finding     12/18/08,Satisfactory GYN examination     Other  "pulmonary embolism without acute cor pulmonale (H)     History of pulmonary emboli after      Personal history of other medical treatment (CODE)      2, para 1-0-1-1     Personal history of other medical treatment (CODE)     10/00,History of blood transfusion with        Past Surgical History:   Procedure Laterality Date     ant/post colporr w enterocele incl cysturethroscopy  2017    Dr. Mc      SECTION      10/00 /06,History of blood transfusion with      COLONOSCOPY      ,,,F/U      COLONOSCOPY N/A 2020    Procedure: COLONOSCOPY, WITH POLYPECTOMY AND BIOPSY;  Surgeon: Rachel Acevedo MD;  Location: [facility] OR     COLONOSCOPY N/A 10/12/2020    1 sessile serrated, 1 tubular - f/u 3 years     Cryotherapy of Cervix       LAPAROSCOPIC ASSISTED HYSTERECTOMY VAGINAL, BILATERAL SALPINGO-OOPHORECTOMY, COMBINED      [facility]     LAPAROSCOPIC TUBAL LIGATION      2006       ENT family history reviewed    Social History     Tobacco Use     Smoking status: Every Day     Packs/day: 0.13     Years: 30.00     Pack years: 3.90     Types: Cigarettes     Smokeless tobacco: Never   Vaping Use     Vaping Use: Never used   Substance Use Topics     Alcohol use: Yes     Comment: Alcoholic Drinks/day: vodka 4-5 times a week     Drug use: No     Comment: Drug use: No       Review of Systems  ROS: 10 point ROS neg other than the symptoms noted above in the HPI     Physical Exam  /60   Pulse 91   Temp 97.5  F (36.4  C)   Ht 1.543 m (5' 0.75\")   Wt 76.2 kg (168 lb)   SpO2 100%   BMI 32.01 kg/m    General - The patient is well nourished and well developed, and appears to have good nutritional status.  Alert and oriented to person and place, answers questions and cooperates with examination appropriately.   Head and Face - Normocephalic and atraumatic, with no gross asymmetry noted.  The facial nerve is intact, with strong symmetric " movements.  Voice and Breathing - The patient was breathing comfortably without the use of accessory muscles. There was no wheezing, stridor, or stertor.  The patients voice was clear and strong, and had appropriate pitch and quality.  Ears -The external auditory canals are patent, the tympanic membranes are intact without effusion, retraction or mass.  Bony landmarks are intact.  Eyes - Extraocular movements intact, and the pupils were reactive to light.  Sclera were not icteric or injected, conjunctiva were pink and moist.  Mouth - Examination of the oral cavity showed pink, healthy oral mucosa. No lesions or ulcerations noted.  The tongue was mobile and midline, and the dentition were in good condition.    Throat - The walls of the oropharynx were smooth, pink, moist, symmetric, and had no lesions or ulcerations.  The tonsillar pillars and soft palate were symmetric.  The uvula was midline on elevation.    Neck - Normal midline excursion of the laryngotracheal complex during swallowing.  Full range of motion on passive movement.  Palpation of the occipital, submental, submandibular, internal jugular chain, and supraclavicular nodes did not demonstrate any abnormal lymph nodes or masses.  Palpation of the thyroid was soft and smooth, with no nodules or goiter appreciated.  The trachea was mobile and midline.  Nose - External contour is symmetric, no gross deflection or scars.  Nasal mucosa is pink and moist with no abnormal mucus.  The septum was intact and the turbinates are enlarged.  No polyps, masses, or purulence noted on examination.    To evaluate the nose and sinuses due to CRS, nasal congestion, I performed rigid nasal endoscopy. The nose was anesthetized with home afrin or topical lidocaine and neosynephrine in the office.    I began with the LEFT side using a 0 degree rigid nasal endoscope, and then similarly examined the RIGHT side    Findings:  Septum: Deviated to right with right IT  contact.  Inferior turbinates: Bilateral hypertrophy   Middle turbinate and middle meatus:  No purulence, no polyposis, no synechiae  Left middle turbinate appears filling space, kiersten bullosa  Mucosa is  healthy throughout without polyps nor polypoid degeneration  Nasopharynx appears patent.  Left eustachian tube appears patent.  I was not able to visualize eustachian tube on right.    Bilateral nasal valve collapse.  Left greater than right.  Significant improvement with Xochitl maneuver.  Photos taken       Impression and Plan- Radha Ruiz is a 47 year old female with:      ICD-10-CM    1. DNS (deviated nasal septum)  J34.2 budesonide (PULMICORT) 0.5 MG/2ML neb solution     beclomethasone (QNASL) 80 MCG/ACT nasal aerosol      2. Nasal turbinate hypertrophy  J34.3 budesonide (PULMICORT) 0.5 MG/2ML neb solution     beclomethasone (QNASL) 80 MCG/ACT nasal aerosol      3. Nasal valve collapse  J34.89 budesonide (PULMICORT) 0.5 MG/2ML neb solution     beclomethasone (QNASL) 80 MCG/ACT nasal aerosol      4. Nasal obstruction  J34.89       5. Perennial allergic rhinitis with seasonal variation  J30.89     J30.2       6. Tobacco abuse  Z72.0         Start budesonide rinses.  Rinse 1-2 times daily.    Start Qnasl 2 sprays each nostril daily.  Discussed completing allergy testing at this time however, patient wishes to pursue surgical options prior to completing M QT.    We discussed surgical options today including septoplasty, turbinate reduction, excision left kiersten bullosa, nasal valve repair and posterior nerve ablation.  This will be arranged with Dr. Stoddard.     Use nasal saline as discussed today. Over the counter Ansley med saline irrigation is recommended.  Try to use hypertonic saline which is 2 packages in 1 ansley med bottle per instructions on bottle.  Use this at least 2 times a day, blow your nose gently, then apply any other nasal medication that may have been prescribed today (nasal steroids or  nasal antihistamines).  Take your antihistamine daily (cetirizine, loratadine, fexofenadine, or similar) or twice daily if recommended.      Jena Breen PA-C  ENT  [facility], [place]

## 2022-11-18 NOTE — LETTER
2022         RE: Radha Ruiz  [id number] [facility] [address]        Dear Colleague,    Thank you for referring your patient, Radha Ruiz, to the [facility]. Please see a copy of my visit note below.      Otolaryngology Consultation    Patient: Radha Ruiz  : 1975    Patient presents with:  Consult: Referred by Dr. Adhikari for chronic AR due to pollen and chronic sinusitis.      HPI:  Radha Ruiz is a 47 year old female seen today for allergy evaluation.Radha has felt ongoing concerns with nasal congestion, post nasal drainage, allergies.   She has been having symptoms worsened over the last few years. SHe feels increase in post nasal drainage and has consent sensation of PND/ throat clearing. Swelling along her face/ eyes/ dizziness. She feels increase in GI concerns related to this.   She has felt nasal blockage at times, panic sensation related to possible obstruction.     She is having some sinus issues. She has had some slight headache and low temp.  She has allergies and has been irrigating and using Zyrtec D and Flonase and breathe-rite strips.  She thinks her allergies are a trigger. She gets clear discharge with irrigation. She has a lot of post nasal drainage. She has put up with this for 2 months. She is very tired, but is sleeping well. She continues to smoke.     No nasal injury or trauma  No prior sinus surgery.   She has increase in allergies following her C section delivery/ transfusions.   Worsens with seasonal change, but year long.   No prior allergy testing.     Resides in a house with finished  basement  There is no water or mold.  Carpet in bedroom - Yes,  Heat in home- Forced air.   Animals- dog.   Family hx of allergies - None.   Past trials of medications- Flonase.   Zyrtec D.   Rinses PRN.        Intermittent drainage/ post nasal drip.  Tickle sensation.   Patient denies sore throat or throat pain  Denies chronic  otalgia.   Denies fevers, night sweats or weight loss.   Denies dysphagia or dysphonia.   Denies globus sensation.     Water- adequate  Caffeine- 2 cups coffee, pop intermittent  ETOH- Social.   Tobacco- 10 cigs daily.   Reflux- None.     FINDINGS:     Frontal sinus: The frontal sinuses are clear. The frontoethmoidal  recesses are clear.     Anterior ethmoids: The anterior ethmoid air cells are clear.     Maxillary sinuses: The maxillary sinuses are clear. The ostiomeatal  units are clear.     Posterior ethmoids: The posterior ethmoid air cells are clear.     Sphenoid sinus: The sphenoid sinus and bilateral sphenoethmoidal  recesses are clear.       There is rightward nasal septal deviation anteriorly. The nasal cavity  is clear. The lamina papyracea are intact. The roof of the ethmoids is  relatively symmetric. No chronic osseous changes of the paranasal  sinuses.     Normal retromaxillary and pterygopalatine fat. Mastoid air cells are  clear. The orbits are grossly unremarkable.                                                                      IMPRESSION:  No findings of sinusitis.     CÉSAR RIBEIRO MD        Allergies   Allergen Reactions     Sulfa Drugs Hives         Current Outpatient Rx   Medication Sig Dispense Refill     cetirizine-pseudoePHEDrine ER (ZYRTEC-D ALLERGY & CONGESTION) 5-120 MG 12 hr tablet TAKE 1 TABLET BY MOUTH TWICE DAILY 72 tablet 5     fluticasone (FLONASE) 50 MCG/ACT nasal spray INHALE 2 SPRAYS INTO BOTH NOSTRILS ONCE DAILY. 48 mL 11     linaclotide (LINZESS) 72 MCG capsule Take 1 capsule (72 mcg) by mouth daily 90 capsule 11     traZODone (DESYREL) 100 MG tablet TAKE 3-4 TABLETS BY MOUTH AT BEDTIME. 360 tablet 11     venlafaxine (EFFEXOR XR) 150 MG 24 hr capsule TAKE 1 CAPSULE BY MOUTH EVERY DAY WITH FOOD 90 capsule 11     venlafaxine (EFFEXOR XR) 75 MG 24 hr capsule Take 1 capsule (75 mg) by mouth daily Take with the 150 for a total of 225 mg daily. 90 capsule 11     Vitamin D,  "Cholecalciferol, 25 MCG (1000 UT) CAPS Take 10,000 Units by mouth daily         Allergies: Sulfa drugs     Past Medical History:   Diagnosis Date     Encounter for gynecological examination without abnormal finding     08,Satisfactory GYN examination     Other pulmonary embolism without acute cor pulmonale (H)     History of pulmonary emboli after      Personal history of other medical treatment (CODE)      2, para 1-0-1-1     Personal history of other medical treatment (CODE)     10/00,History of blood transfusion with        Past Surgical History:   Procedure Laterality Date     ant/post colporr w enterocele incl cysturethroscopy  2017    Dr. Mc      SECTION      10/00 /06,History of blood transfusion with      COLONOSCOPY      ,,,F/U 2019     COLONOSCOPY N/A 2020    Procedure: COLONOSCOPY, WITH POLYPECTOMY AND BIOPSY;  Surgeon: Rachel Acevedo MD;  Location: [facility] OR     COLONOSCOPY N/A 10/12/2020    1 sessile serrated, 1 tubular - f/u 3 years     Cryotherapy of Cervix       LAPAROSCOPIC ASSISTED HYSTERECTOMY VAGINAL, BILATERAL SALPINGO-OOPHORECTOMY, COMBINED      Ney Hart MD [facility]     LAPAROSCOPIC TUBAL LIGATION      2006       ENT family history reviewed    Social History     Tobacco Use     Smoking status: Every Day     Packs/day: 0.13     Years: 30.00     Pack years: 3.90     Types: Cigarettes     Smokeless tobacco: Never   Vaping Use     Vaping Use: Never used   Substance Use Topics     Alcohol use: Yes     Comment: Alcoholic Drinks/day: vodka 4-5 times a week     Drug use: No     Comment: Drug use: No       Review of Systems  ROS: 10 point ROS neg other than the symptoms noted above in the HPI     Physical Exam  /60   Pulse 91   Temp 97.5  F (36.4  C)   Ht 1.543 m (5' 0.75\")   Wt 76.2 kg (168 lb)   SpO2 100%   BMI 32.01 kg/m    General - The patient is well nourished and well developed, and appears to have " good nutritional status.  Alert and oriented to person and place, answers questions and cooperates with examination appropriately.   Head and Face - Normocephalic and atraumatic, with no gross asymmetry noted.  The facial nerve is intact, with strong symmetric movements.  Voice and Breathing - The patient was breathing comfortably without the use of accessory muscles. There was no wheezing, stridor, or stertor.  The patients voice was clear and strong, and had appropriate pitch and quality.  Ears -The external auditory canals are patent, the tympanic membranes are intact without effusion, retraction or mass.  Bony landmarks are intact.  Eyes - Extraocular movements intact, and the pupils were reactive to light.  Sclera were not icteric or injected, conjunctiva were pink and moist.  Mouth - Examination of the oral cavity showed pink, healthy oral mucosa. No lesions or ulcerations noted.  The tongue was mobile and midline, and the dentition were in good condition.    Throat - The walls of the oropharynx were smooth, pink, moist, symmetric, and had no lesions or ulcerations.  The tonsillar pillars and soft palate were symmetric.  The uvula was midline on elevation.    Neck - Normal midline excursion of the laryngotracheal complex during swallowing.  Full range of motion on passive movement.  Palpation of the occipital, submental, submandibular, internal jugular chain, and supraclavicular nodes did not demonstrate any abnormal lymph nodes or masses.  Palpation of the thyroid was soft and smooth, with no nodules or goiter appreciated.  The trachea was mobile and midline.  Nose - External contour is symmetric, no gross deflection or scars.  Nasal mucosa is pink and moist with no abnormal mucus.  The septum was intact and the turbinates are enlarged.  No polyps, masses, or purulence noted on examination.    To evaluate the nose and sinuses due to CRS, nasal congestion, I performed rigid nasal endoscopy. The nose was  anesthetized with home afrin or topical lidocaine and neosynephrine in the office.    I began with the LEFT side using a 0 degree rigid nasal endoscope, and then similarly examined the RIGHT side    Findings:  Septum: Deviated to right with right IT contact.  Inferior turbinates: Bilateral hypertrophy   Middle turbinate and middle meatus:  No purulence, no polyposis, no synechiae  Left middle turbinate appears filling space, kiersten bullosa  Mucosa is  healthy throughout without polyps nor polypoid degeneration  Nasopharynx appears patent.  Left eustachian tube appears patent.  I was not able to visualize eustachian tube on right.    Bilateral nasal valve collapse.  Left greater than right.  Significant improvement with Xochitl maneuver.  Photos taken       Impression and Plan- Radha Ruiz is a 47 year old female with:      ICD-10-CM    1. DNS (deviated nasal septum)  J34.2 budesonide (PULMICORT) 0.5 MG/2ML neb solution     beclomethasone (QNASL) 80 MCG/ACT nasal aerosol      2. Nasal turbinate hypertrophy  J34.3 budesonide (PULMICORT) 0.5 MG/2ML neb solution     beclomethasone (QNASL) 80 MCG/ACT nasal aerosol      3. Nasal valve collapse  J34.89 budesonide (PULMICORT) 0.5 MG/2ML neb solution     beclomethasone (QNASL) 80 MCG/ACT nasal aerosol      4. Nasal obstruction  J34.89       5. Perennial allergic rhinitis with seasonal variation  J30.89     J30.2       6. Tobacco abuse  Z72.0         Start budesonide rinses.  Rinse 1-2 times daily.    Start Qnasl 2 sprays each nostril daily.  Discussed completing allergy testing at this time however, patient wishes to pursue surgical options prior to completing M QT.    We discussed surgical options today including septoplasty, turbinate reduction, excision left kiersten bullosa, nasal valve repair and posterior nerve ablation.  This will be arranged with Dr. Stoddard.     Use nasal saline as discussed today. Over the counter Drake med saline irrigation is recommended.   Try to use hypertonic saline which is 2 packages in 1 ansley med bottle per instructions on bottle.  Use this at least 2 times a day, blow your nose gently, then apply any other nasal medication that may have been prescribed today (nasal steroids or nasal antihistamines).  Take your antihistamine daily (cetirizine, loratadine, fexofenadine, or similar) or twice daily if recommended.      Jena Breen PA-C  ENT  [facility], [place]                Again, thank you for allowing me to participate in the care of your patient.        Sincerely,        Jena Breen PA-C

## 2022-11-22 ENCOUNTER — PREP FOR PROCEDURE (OUTPATIENT)
Dept: OTOLARYNGOLOGY | Facility: OTHER | Age: 47
End: 2022-11-22

## 2022-12-12 ENCOUNTER — ANESTHESIA EVENT (OUTPATIENT)
Dept: SURGERY | Facility: HOSPITAL | Age: 47
End: 2022-12-12
Payer: COMMERCIAL

## 2022-12-12 DIAGNOSIS — F41.1 ANXIETY STATE: ICD-10-CM

## 2022-12-12 DIAGNOSIS — J30.1 SEASONAL ALLERGIC RHINITIS DUE TO POLLEN: ICD-10-CM

## 2022-12-12 DIAGNOSIS — F33.41 RECURRENT MAJOR DEPRESSION IN PARTIAL REMISSION (H): ICD-10-CM

## 2022-12-12 DIAGNOSIS — F33.41 DEPRESSION, MAJOR, RECURRENT, IN PARTIAL REMISSION (H): ICD-10-CM

## 2022-12-12 ASSESSMENT — LIFESTYLE VARIABLES: TOBACCO_USE: 1

## 2022-12-12 NOTE — ANESTHESIA PREPROCEDURE EVALUATION
Anesthesia Pre-Procedure Evaluation    Patient: Radha Ruiz   MRN: [id number] : 1975        Procedure : Procedure(s):  Septoplasty, Bilateral turbinate reduction          Past Medical History:   Diagnosis Date     Encounter for gynecological examination without abnormal finding     08,Satisfactory GYN examination     Other pulmonary embolism without acute cor pulmonale (H)     History of pulmonary emboli after      Personal history of other medical treatment (CODE)      2, para 1-0-1-1     Personal history of other medical treatment (CODE)     10/00,History of blood transfusion with       Past Surgical History:   Procedure Laterality Date     ant/post colporr w enterocele incl cysturethroscopy  2017    Dr. Mc      SECTION      10/00 /06,History of blood transfusion with      COLONOSCOPY      ,,,F/U      COLONOSCOPY N/A 2020    Procedure: COLONOSCOPY, WITH POLYPECTOMY AND BIOPSY;  Surgeon: Rachel Acevedo MD;  Location: [facility] OR     COLONOSCOPY N/A 10/12/2020    1 sessile serrated, 1 tubular - f/u 3 years     Cryotherapy of Cervix       LAPAROSCOPIC ASSISTED HYSTERECTOMY VAGINAL, BILATERAL SALPINGO-OOPHORECTOMY, COMBINED      Ney Hart MD [facility]     LAPAROSCOPIC TUBAL LIGATION      2006      Allergies   Allergen Reactions     Sulfa Drugs Hives      Social History     Tobacco Use     Smoking status: Every Day     Packs/day: 0.13     Years: 30.00     Pack years: 3.90     Types: Cigarettes     Smokeless tobacco: Never   Substance Use Topics     Alcohol use: Yes     Comment: Alcoholic Drinks/day: vodka 4-5 times a week      Wt Readings from Last 1 Encounters:   22 76.2 kg (168 lb)        Anesthesia Evaluation   Pt has had prior anesthetic. Type: General and MAC.        ROS/MED HX  ENT/Pulmonary: Comment: Deviated nasal septum  Nasal turbinate hypertrophy  [place] bullosa  Nasal valve collapse    Did not smoke  today    (+) ACE risk factors, snores loudly, allergic rhinitis, tobacco use, Current use,     Neurologic: Comment: RLS    (+) migraines,     Cardiovascular:  - neg cardiovascular ROS     METS/Exercise Tolerance:     Hematologic:     (+) History of blood clots (hx PE), history of blood transfusion, no previous transfusion reaction,     Musculoskeletal:       GI/Hepatic: Comment: Constipation    (+) GERD, Asymptomatic on medication,     Renal/Genitourinary:       Endo:     (+) Obesity,     Psychiatric/Substance Use:     (+) psychiatric history depression and anxiety H/O chronic opiod use .     Infectious Disease:  - neg infectious disease ROS     Malignancy:  - neg malignancy ROS     Other:  - neg other ROS    (+) , H/O Chronic Pain,        Physical Exam    Airway        Mallampati: II   TM distance: > 3 FB   Neck ROM: full   Mouth opening: > 3 cm    Respiratory Devices and Support         Dental           Cardiovascular   cardiovascular exam normal       Rhythm and rate: regular and normal     Pulmonary   pulmonary exam normal        breath sounds clear to auscultation           OUTSIDE LABS:  CBC:   Lab Results   Component Value Date    WBC 5.9 10/24/2022    WBC 6.7 09/23/2021    HGB 14.0 10/24/2022    HGB 14.0 09/23/2021    HCT 39.6 10/24/2022    HCT 41.5 09/23/2021     10/24/2022     09/23/2021     BMP:   Lab Results   Component Value Date     10/24/2022     09/23/2021    POTASSIUM 3.9 10/24/2022    POTASSIUM 3.9 09/23/2021    CHLORIDE 100 10/24/2022    CHLORIDE 103 09/23/2021    CO2 26 10/24/2022    CO2 28 09/23/2021    BUN 8 10/24/2022    BUN 7 09/23/2021    CR 0.93 10/24/2022    CR 0.89 09/23/2021    GLC 93 10/24/2022    GLC 94 09/23/2021     COAGS: No results found for: PTT, INR, FIBR  POC:   Lab Results   Component Value Date    HCG Negative 10/17/2012     HEPATIC:   Lab Results   Component Value Date    ALBUMIN 4.7 10/24/2022    PROTTOTAL 7.3 10/24/2022    ALT 24 10/24/2022    AST  31 10/24/2022    ALKPHOS 68 10/24/2022    BILITOTAL 0.5 10/24/2022     OTHER:   Lab Results   Component Value Date    MARTINEZ 10.1 10/24/2022    TSH 1.64 10/24/2022       Anesthesia Plan    ASA Status:  3   NPO Status:  NPO Appropriate    Anesthesia Type: General.     - Airway: ETT   Induction: Intravenous, Propofol.   Maintenance: Balanced.        Consents    Anesthesia Plan(s) and associated risks, benefits, and realistic alternatives discussed. Questions answered and patient/representative(s) expressed understanding.     - Discussed: Risks, Benefits and Alternatives for BOTH SEDATION and the PROCEDURE were discussed     - Discussed with:  Patient      - Extended Intubation/Ventilatory Support Discussed: No.      - Patient is DNR/DNI Status: No    Use of blood products discussed: No .     Postoperative Care    Pain management: IV analgesics.   PONV prophylaxis: Dexamethasone or Solumedrol, Ondansetron (or other 5HT-3)     Comments:    Other Comments: 12/13 Onofre Louis, JOSE CRNA

## 2022-12-13 ENCOUNTER — OFFICE VISIT (OUTPATIENT)
Dept: FAMILY MEDICINE | Facility: OTHER | Age: 47
End: 2022-12-13
Attending: FAMILY MEDICINE
Payer: COMMERCIAL

## 2022-12-13 VITALS
RESPIRATION RATE: 16 BRPM | OXYGEN SATURATION: 97 % | HEIGHT: 60 IN | TEMPERATURE: 96.9 F | HEART RATE: 88 BPM | SYSTOLIC BLOOD PRESSURE: 124 MMHG | DIASTOLIC BLOOD PRESSURE: 86 MMHG | BODY MASS INDEX: 31.41 KG/M2 | WEIGHT: 160 LBS

## 2022-12-13 DIAGNOSIS — J34.89 CONCHA BULLOSA: ICD-10-CM

## 2022-12-13 DIAGNOSIS — J34.829 NASAL VALVE COLLAPSE: ICD-10-CM

## 2022-12-13 DIAGNOSIS — J34.3 NASAL TURBINATE HYPERTROPHY: ICD-10-CM

## 2022-12-13 DIAGNOSIS — Z01.818 PREOP GENERAL PHYSICAL EXAM: Primary | ICD-10-CM

## 2022-12-13 DIAGNOSIS — J34.2 DEVIATED NASAL SEPTUM: ICD-10-CM

## 2022-12-13 LAB
ANION GAP SERPL CALCULATED.3IONS-SCNC: 11 MMOL/L (ref 7–15)
BASOPHILS # BLD AUTO: 0.1 10E3/UL (ref 0–0.2)
BASOPHILS NFR BLD AUTO: 1 %
BUN SERPL-MCNC: 8.6 MG/DL (ref 6–20)
CALCIUM SERPL-MCNC: 9.9 MG/DL (ref 8.6–10)
CHLORIDE SERPL-SCNC: 98 MMOL/L (ref 98–107)
CREAT SERPL-MCNC: 0.65 MG/DL (ref 0.51–0.95)
DEPRECATED HCO3 PLAS-SCNC: 23 MMOL/L (ref 22–29)
EOSINOPHIL # BLD AUTO: 0.1 10E3/UL (ref 0–0.7)
EOSINOPHIL NFR BLD AUTO: 1 %
ERYTHROCYTE [DISTWIDTH] IN BLOOD BY AUTOMATED COUNT: 12 % (ref 10–15)
GFR SERPL CREATININE-BSD FRML MDRD: >90 ML/MIN/1.73M2
GLUCOSE SERPL-MCNC: 102 MG/DL (ref 70–99)
HCT VFR BLD AUTO: 37.7 % (ref 35–47)
HGB BLD-MCNC: 13.5 G/DL (ref 11.7–15.7)
IMM GRANULOCYTES # BLD: 0 10E3/UL
IMM GRANULOCYTES NFR BLD: 0 %
LYMPHOCYTES # BLD AUTO: 2.6 10E3/UL (ref 0.8–5.3)
LYMPHOCYTES NFR BLD AUTO: 28 %
MCH RBC QN AUTO: 33.5 PG (ref 26.5–33)
MCHC RBC AUTO-ENTMCNC: 35.8 G/DL (ref 31.5–36.5)
MCV RBC AUTO: 94 FL (ref 78–100)
MONOCYTES # BLD AUTO: 0.5 10E3/UL (ref 0–1.3)
MONOCYTES NFR BLD AUTO: 6 %
NEUTROPHILS # BLD AUTO: 6 10E3/UL (ref 1.6–8.3)
NEUTROPHILS NFR BLD AUTO: 64 %
NRBC # BLD AUTO: 0 10E3/UL
NRBC BLD AUTO-RTO: 0 /100
PLATELET # BLD AUTO: 325 10E3/UL (ref 150–450)
POTASSIUM SERPL-SCNC: 4.1 MMOL/L (ref 3.4–5.3)
RBC # BLD AUTO: 4.03 10E6/UL (ref 3.8–5.2)
SODIUM SERPL-SCNC: 132 MMOL/L (ref 136–145)
WBC # BLD AUTO: 9.3 10E3/UL (ref 4–11)

## 2022-12-13 PROCEDURE — 36415 COLL VENOUS BLD VENIPUNCTURE: CPT | Mod: ZL | Performed by: FAMILY MEDICINE

## 2022-12-13 PROCEDURE — 99214 OFFICE O/P EST MOD 30 MIN: CPT | Performed by: FAMILY MEDICINE

## 2022-12-13 PROCEDURE — 80048 BASIC METABOLIC PNL TOTAL CA: CPT | Mod: ZL | Performed by: FAMILY MEDICINE

## 2022-12-13 PROCEDURE — 85025 COMPLETE CBC W/AUTO DIFF WBC: CPT | Mod: ZL | Performed by: FAMILY MEDICINE

## 2022-12-13 ASSESSMENT — ANXIETY QUESTIONNAIRES
7. FEELING AFRAID AS IF SOMETHING AWFUL MIGHT HAPPEN: NOT AT ALL
2. NOT BEING ABLE TO STOP OR CONTROL WORRYING: SEVERAL DAYS
GAD7 TOTAL SCORE: 5
1. FEELING NERVOUS, ANXIOUS, OR ON EDGE: SEVERAL DAYS
IF YOU CHECKED OFF ANY PROBLEMS ON THIS QUESTIONNAIRE, HOW DIFFICULT HAVE THESE PROBLEMS MADE IT FOR YOU TO DO YOUR WORK, TAKE CARE OF THINGS AT HOME, OR GET ALONG WITH OTHER PEOPLE: SOMEWHAT DIFFICULT
6. BECOMING EASILY ANNOYED OR IRRITABLE: SEVERAL DAYS
GAD7 TOTAL SCORE: 5
8. IF YOU CHECKED OFF ANY PROBLEMS, HOW DIFFICULT HAVE THESE MADE IT FOR YOU TO DO YOUR WORK, TAKE CARE OF THINGS AT HOME, OR GET ALONG WITH OTHER PEOPLE?: SOMEWHAT DIFFICULT
3. WORRYING TOO MUCH ABOUT DIFFERENT THINGS: NOT AT ALL
7. FEELING AFRAID AS IF SOMETHING AWFUL MIGHT HAPPEN: NOT AT ALL
GAD7 TOTAL SCORE: 5
4. TROUBLE RELAXING: SEVERAL DAYS
5. BEING SO RESTLESS THAT IT IS HARD TO SIT STILL: SEVERAL DAYS

## 2022-12-13 ASSESSMENT — PATIENT HEALTH QUESTIONNAIRE - PHQ9
10. IF YOU CHECKED OFF ANY PROBLEMS, HOW DIFFICULT HAVE THESE PROBLEMS MADE IT FOR YOU TO DO YOUR WORK, TAKE CARE OF THINGS AT HOME, OR GET ALONG WITH OTHER PEOPLE: SOMEWHAT DIFFICULT
SUM OF ALL RESPONSES TO PHQ QUESTIONS 1-9: 6
SUM OF ALL RESPONSES TO PHQ QUESTIONS 1-9: 6

## 2022-12-13 ASSESSMENT — PAIN SCALES - GENERAL: PAINLEVEL: NO PAIN (0)

## 2022-12-13 NOTE — H&P (VIEW-ONLY)
[facility] AND [facility]  [address]  [place] [address]  Phone: [phone]  Fax: [phone]  Primary Provider: Rose Adhikari  Pre-op Performing Provider: ROSE ADHIKARI      PREOPERATIVE EVALUATION:  Today's date: 12/13/2022    Radha Ruiz is a 47 year old female who presents for a preoperative evaluation.    Surgical Information:  Surgery/Procedure: Septoplasty and bilateral turbinate reduction  Surgery Location: [facility]  Surgeon: Dr Stoddard  Surgery Date: 12/20/2022  Time of Surgery: TBD  Where patient plans to recover: At home with family  Fax number for surgical facility: Note does not need to be faxed, will be available electronically in Epic.    Type of Anesthesia Anticipated: to be determined    Radha was seen today for pre-op exam.    Diagnoses and all orders for this visit:    Preop general physical exam  -     CBC with Platelets & Differential; Future  -     Basic metabolic panel; Future  -     CBC with Platelets & Differential  -     Basic metabolic panel    Deviated nasal septum    Nasal turbinate hypertrophy    [place] bullosa    Nasal valve collapse       Okay to go ahead with planned procedure.  Recommend she take her Effexor XR and she can hold the other medicines she takes that morning and resume that night.    Rose Adhikari MD     Answers for HPI/ROS submitted by the patient on 12/13/2022  If you checked off any problems, how difficult have these problems made it for you to do your work, take care of things at home, or get along with other people?: Somewhat difficult  PHQ9 TOTAL SCORE: 6  ERMELINDA 7 TOTAL SCORE: 5      Subjective     HPI related to upcoming procedure: Chronic nasal congestion and snoring with deviated septum who on evaluation from Dr. Stoddard was felt to be a candidate for septoplasty and bilateral turbinate reduction.  I am asked to see her for preoperative risk assessment consultation.  She has never had trouble with  any other surgeries.    Preop Questions 12/13/2022   1. Have you ever had a heart attack or stroke? No   2. Have you ever had surgery on your heart or blood vessels, such as a stent placement, a coronary artery bypass, or surgery on an artery in your head, neck, heart, or legs? No   3. Do you have chest pain with activity? No   4. Do you have a history of  heart failure? No   5. Do you currently have a cold, bronchitis or symptoms of other infection? No   6. Do you have a cough, shortness of breath, or wheezing? No   7. Do you or anyone in your family have previous history of blood clots? YES - self   8. Do you or does anyone in your family have a serious bleeding problem such as prolonged bleeding following surgeries or cuts? No   9. Have you ever had problems with anemia or been told to take iron pills? No   10. Have you had any abnormal blood loss such as black, tarry or bloody stools, or abnormal vaginal bleeding? YES - before hysterectomy   11. Have you ever had a blood transfusion? YES - after childbirth in 2000   11a. Have you ever had a transfusion reaction? No   12. Are you willing to have a blood transfusion if it is medically needed before, during, or after your surgery? Yes   13. Have you or any of your relatives ever had problems with anesthesia? No   14. Do you have sleep apnea, excessive snoring or daytime drowsiness? YES - excessive snoring   14a. Do you have a CPAP machine? No   15. Do you have any artifical heart valves or other implanted medical devices like a pacemaker, defibrillator, or continuous glucose monitor? No   16. Do you have artificial joints? No   17. Are you allergic to latex? No   18. Is there any chance that you may be pregnant? No       Health Care Directive:  Patient does not have a Health Care Directive or Living Will: Discussed advance care planning with patient; however, patient declined at this time.    Preoperative Review of :   reviewed - no record of controlled  substances prescribed.  PDMP Review       Value Time User    State PDMP site checked  Yes 2022  4:41 PM Rashad Adhikari MD           She has irritable bowel syndrome that is controlled on Linzess.  She uses trazodone at bedtime for sleep and is on Effexor  mg daily for anxiety.    Review of Systems  Constitutional, neuro, ENT, endocrine, pulmonary, cardiac, gastrointestinal, genitourinary, musculoskeletal, integument and psychiatric systems are negative, except as otherwise noted.    Patient Active Problem List    Diagnosis Date Noted     Polyp of ascending colon, unspecified type 10/02/2020     Priority: Medium     Added automatically from request for surgery [id number]       Tobacco abuse 2018     Priority: Medium     Chronic allergic rhinitis due to pollen, unspecified seasonality 2018     Priority: Medium     Anxiety state 2018     Priority: Medium     Constipation 2018     Priority: Medium     Migraine headache 2018     Priority: Medium     Restless leg syndrome 2018     Priority: Medium     Surgical menopause 2015     Priority: Medium     H/O adenomatous polyp of colon 10/06/2014     Priority: Medium     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        Past Medical History:   Diagnosis Date     Encounter for gynecological examination without abnormal finding     08,Satisfactory GYN examination     Other pulmonary embolism without acute cor pulmonale (H)     History of pulmonary emboli after      Personal history of other medical treatment (CODE)      2, para 1-0-1-1     Personal history of other medical treatment (CODE)     10/00,History of blood transfusion with      Past Surgical  History:   Procedure Laterality Date     ant/post colporr w enterocele incl cysturethroscopy  2017    Dr. Mc      SECTION      10/00 /06,History of blood transfusion with      COLONOSCOPY      ,,,F/U      COLONOSCOPY N/A 2020    Procedure: COLONOSCOPY, WITH POLYPECTOMY AND BIOPSY;  Surgeon: Rachel Acevedo MD;  Location: [facility] OR     COLONOSCOPY N/A 10/12/2020    1 sessile serrated, 1 tubular - f/u 3 years     Cryotherapy of Cervix       LAPAROSCOPIC ASSISTED HYSTERECTOMY VAGINAL, BILATERAL SALPINGO-OOPHORECTOMY, COMBINED      Ney Hart MD Ess     LAPAROSCOPIC TUBAL LIGATION           Current Outpatient Medications   Medication Sig Dispense Refill     beclomethasone (QNASL) 80 MCG/ACT nasal aerosol Spray 2 sprays into both nostrils daily 10.6 g 11     budesonide (PULMICORT) 0.5 MG/2ML neb solution Make 240 cc Drake med sinus irrigation Mix 2 ml vial of budesonide 0.5 mg Rinse  mL 3     cetirizine-pseudoePHEDrine ER (ZYRTEC-D ALLERGY & CONGESTION) 5-120 MG 12 hr tablet TAKE 1 TABLET BY MOUTH TWICE DAILY 72 tablet 5     fluticasone (FLONASE) 50 MCG/ACT nasal spray INHALE 2 SPRAYS INTO BOTH NOSTRILS ONCE DAILY. 48 mL 11     linaclotide (LINZESS) 72 MCG capsule Take 1 capsule (72 mcg) by mouth daily 90 capsule 11     traZODone (DESYREL) 100 MG tablet TAKE 3-4 TABLETS BY MOUTH AT BEDTIME. 360 tablet 11     venlafaxine (EFFEXOR XR) 150 MG 24 hr capsule TAKE 1 CAPSULE BY MOUTH EVERY DAY WITH FOOD 90 capsule 11     venlafaxine (EFFEXOR XR) 75 MG 24 hr capsule Take 1 capsule (75 mg) by mouth daily Take with the 150 for a total of 225 mg daily. 90 capsule 11     Vitamin D, Cholecalciferol, 25 MCG (1000 UT) CAPS Take 10,000 Units by mouth daily         Allergies   Allergen Reactions     Sulfa Drugs Hives        Social History     Tobacco Use     Smoking status: Every Day     Packs/day: 0.13     Years: 30.00     Pack years: 3.90     Types: Cigarettes      "Smokeless tobacco: Never   Substance Use Topics     Alcohol use: Yes     Comment: Alcoholic Drinks/day: vodka 4-5 times a week     Family History   Problem Relation Age of Onset     Colon Cancer Father 38        Cancer-colon,Colon cancer     Other - See Comments Mother         Psychiatric illness,Untreated anxiety/D&C for postmenopausal bleeding benign     Cancer Maternal Grandfather         Cancer,Brain     Heart Disease Maternal Grandfather         Heart Disease,MI     Hypertension Maternal Grandfather         Hypertension     Other - See Comments Maternal Uncle         Hemochromatosis     Hypertension Maternal Grandmother         Hypertension     Other - See Comments Maternal Grandmother         Alzheimer's     Diabetes Other         Diabetes,Diabetes     Heart Disease Paternal Grandmother         Heart Disease,CHF     Cancer Maternal Uncle         Cancer,cancer all over.     Cancer Paternal Uncle         Cancer,Lung cancer     Breast Cancer No family hx of         Cancer-breast     History   Drug Use No     Comment: Drug use: No         Objective     /86   Pulse 88   Temp 96.9  F (36.1  C) (Temporal)   Resp 16   Ht 1.53 m (5' 0.25\")   Wt 72.6 kg (160 lb)   LMP  (LMP Unknown)   SpO2 97%   Breastfeeding No   BMI 30.99 kg/m      Physical Exam  General Appearance: Pleasant, alert, appropriate appearance for age. No acute distress  Head Exam: Normal. Normocephalic, atraumatic.  Eye Exam: PERRLA, EOMI, conjunctivae, sclerae normal.  Ear Exam: Normal TM's bilaterally. Normal auditory canals and external ears. Non-tender.  Nose Exam: Normal external nose, mucus membranes, and septum.  OroPharynx Exam:  Dental hygiene adequate. Normal buccal mucosa. Normal pharynx.  Neck Exam:  Supple, no masses or nodes. No bruits  Thyroid Exam: No nodules or enlargement.  Chest/Respiratory Exam: Normal chest wall and respirations. Clear to auscultation.  Cardiovascular Exam: Regular rate and rhythm. S1, S2, no murmur, " click, gallop, or rubs.  Gastrointestinal Exam: Soft, non-tender, no masses or organomegaly.  Lymphatic Exam: Non-palpable nodes in neck,clavicular, axillary, or inguinal regions.  Musculoskeletal Exam: Back is straight and non-tender, full ROM of upper and lower extremities.  Foot Exam: Left and right foot: good pedal pulses  Skin: no rash or abnormalities  Neurologic Exam:  normal gross motor, tone coordination and no tremor.  Psychiatric Exam: Alert and oriented - appropriate affect.     Recent Labs   Lab Test 10/24/22  0811 09/23/21  0954   HGB 14.0 14.0    305    138   POTASSIUM 3.9 3.9   CR 0.93 0.89        Diagnostics:  Recent Results (from the past 24 hour(s))   Basic metabolic panel    Collection Time: 12/13/22  4:20 PM   Result Value Ref Range    Sodium 132 (L) 136 - 145 mmol/L    Potassium 4.1 3.4 - 5.3 mmol/L    Chloride 98 98 - 107 mmol/L    Carbon Dioxide (CO2) 23 22 - 29 mmol/L    Anion Gap 11 7 - 15 mmol/L    Urea Nitrogen 8.6 6.0 - 20.0 mg/dL    Creatinine 0.65 0.51 - 0.95 mg/dL    Calcium 9.9 8.6 - 10.0 mg/dL    Glucose 102 (H) 70 - 99 mg/dL    GFR Estimate >90 >60 mL/min/1.73m2   CBC with platelets and differential    Collection Time: 12/13/22  4:20 PM   Result Value Ref Range    WBC Count 9.3 4.0 - 11.0 10e3/uL    RBC Count 4.03 3.80 - 5.20 10e6/uL    Hemoglobin 13.5 11.7 - 15.7 g/dL    Hematocrit 37.7 35.0 - 47.0 %    MCV 94 78 - 100 fL    MCH 33.5 (H) 26.5 - 33.0 pg    MCHC 35.8 31.5 - 36.5 g/dL    RDW 12.0 10.0 - 15.0 %    Platelet Count 325 150 - 450 10e3/uL    % Neutrophils 64 %    % Lymphocytes 28 %    % Monocytes 6 %    % Eosinophils 1 %    % Basophils 1 %    % Immature Granulocytes 0 %    NRBCs per 100 WBC 0 <1 /100    Absolute Neutrophils 6.0 1.6 - 8.3 10e3/uL    Absolute Lymphocytes 2.6 0.8 - 5.3 10e3/uL    Absolute Monocytes 0.5 0.0 - 1.3 10e3/uL    Absolute Eosinophils 0.1 0.0 - 0.7 10e3/uL    Absolute Basophils 0.1 0.0 - 0.2 10e3/uL    Absolute Immature Granulocytes 0.0  <=0.4 10e3/uL    Absolute NRBCs 0.0 10e3/uL      No EKG required, no history of coronary heart disease, significant arrhythmia, peripheral arterial disease or other structural heart disease.    Revised Cardiac Risk Index (RCRI):  The patient has the following serious cardiovascular risks for perioperative complications:   - No serious cardiac risks = 0 points     RCRI Interpretation: 0 points: Class I (very low risk - 0.4% complication rate)           Signed Electronically by: Rashad Adhikari MD  Copy of this evaluation report is provided to requesting physician.

## 2022-12-13 NOTE — NURSING NOTE
"Chief Complaint   Patient presents with     Pre-Op Exam     Surgery date 12/20/2022       Initial /86   Pulse 88   Temp 96.9  F (36.1  C) (Temporal)   Resp 16   Ht 1.53 m (5' 0.25\")   Wt 72.6 kg (160 lb)   LMP  (LMP Unknown)   SpO2 97%   Breastfeeding No   BMI 30.99 kg/m   Estimated body mass index is 30.99 kg/m  as calculated from the following:    Height as of this encounter: 1.53 m (5' 0.25\").    Weight as of this encounter: 72.6 kg (160 lb).  Medication Reconciliation: complete    FOOD SECURITY SCREENING QUESTIONS  Hunger Vital Signs:  Within the past 12 months we worried whether our food would run out before we got money to buy more. Never  Within the past 12 months the food we bought just didn't last and we didn't have money to get more. Never        Advance care directive on file? no  Advance care directive provided to patient? declined     Leydi Smart LPN  "

## 2022-12-13 NOTE — PROGRESS NOTES
[facility] AND [facility]  [address]  [place] [address]  Phone: [phone]  Fax: [phone]  Primary Provider: Rose Adhikari  Pre-op Performing Provider: ROSE ADHIKARI      PREOPERATIVE EVALUATION:  Today's date: 12/13/2022    Radha Ruiz is a 47 year old female who presents for a preoperative evaluation.    Surgical Information:  Surgery/Procedure: Septoplasty and bilateral turbinate reduction  Surgery Location: [facility]  Surgeon: Dr Stoddard  Surgery Date: 12/20/2022  Time of Surgery: TBD  Where patient plans to recover: At home with family  Fax number for surgical facility: Note does not need to be faxed, will be available electronically in Epic.    Type of Anesthesia Anticipated: to be determined    Radha was seen today for pre-op exam.    Diagnoses and all orders for this visit:    Preop general physical exam  -     CBC with Platelets & Differential; Future  -     Basic metabolic panel; Future  -     CBC with Platelets & Differential  -     Basic metabolic panel    Deviated nasal septum    Nasal turbinate hypertrophy    [place] bullosa    Nasal valve collapse       Okay to go ahead with planned procedure.  Recommend she take her Effexor XR and she can hold the other medicines she takes that morning and resume that night.    Rose Adhikari MD     Answers for HPI/ROS submitted by the patient on 12/13/2022  If you checked off any problems, how difficult have these problems made it for you to do your work, take care of things at home, or get along with other people?: Somewhat difficult  PHQ9 TOTAL SCORE: 6  ERMELINDA 7 TOTAL SCORE: 5      Subjective     HPI related to upcoming procedure: Chronic nasal congestion and snoring with deviated septum who on evaluation from Dr. Stoddard was felt to be a candidate for septoplasty and bilateral turbinate reduction.  I am asked to see her for preoperative risk assessment consultation.  She has never had trouble with  any other surgeries.    Preop Questions 12/13/2022   1. Have you ever had a heart attack or stroke? No   2. Have you ever had surgery on your heart or blood vessels, such as a stent placement, a coronary artery bypass, or surgery on an artery in your head, neck, heart, or legs? No   3. Do you have chest pain with activity? No   4. Do you have a history of  heart failure? No   5. Do you currently have a cold, bronchitis or symptoms of other infection? No   6. Do you have a cough, shortness of breath, or wheezing? No   7. Do you or anyone in your family have previous history of blood clots? YES - self   8. Do you or does anyone in your family have a serious bleeding problem such as prolonged bleeding following surgeries or cuts? No   9. Have you ever had problems with anemia or been told to take iron pills? No   10. Have you had any abnormal blood loss such as black, tarry or bloody stools, or abnormal vaginal bleeding? YES - before hysterectomy   11. Have you ever had a blood transfusion? YES - after childbirth in 2000   11a. Have you ever had a transfusion reaction? No   12. Are you willing to have a blood transfusion if it is medically needed before, during, or after your surgery? Yes   13. Have you or any of your relatives ever had problems with anesthesia? No   14. Do you have sleep apnea, excessive snoring or daytime drowsiness? YES - excessive snoring   14a. Do you have a CPAP machine? No   15. Do you have any artifical heart valves or other implanted medical devices like a pacemaker, defibrillator, or continuous glucose monitor? No   16. Do you have artificial joints? No   17. Are you allergic to latex? No   18. Is there any chance that you may be pregnant? No       Health Care Directive:  Patient does not have a Health Care Directive or Living Will: Discussed advance care planning with patient; however, patient declined at this time.    Preoperative Review of :   reviewed - no record of controlled  substances prescribed.  PDMP Review       Value Time User    State PDMP site checked  Yes 2022  4:41 PM Rashad Adhikari MD           She has irritable bowel syndrome that is controlled on Linzess.  She uses trazodone at bedtime for sleep and is on Effexor  mg daily for anxiety.    Review of Systems  Constitutional, neuro, ENT, endocrine, pulmonary, cardiac, gastrointestinal, genitourinary, musculoskeletal, integument and psychiatric systems are negative, except as otherwise noted.    Patient Active Problem List    Diagnosis Date Noted     Polyp of ascending colon, unspecified type 10/02/2020     Priority: Medium     Added automatically from request for surgery [id number]       Tobacco abuse 2018     Priority: Medium     Chronic allergic rhinitis due to pollen, unspecified seasonality 2018     Priority: Medium     Anxiety state 2018     Priority: Medium     Constipation 2018     Priority: Medium     Migraine headache 2018     Priority: Medium     Restless leg syndrome 2018     Priority: Medium     Surgical menopause 2015     Priority: Medium     H/O adenomatous polyp of colon 10/06/2014     Priority: Medium     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        Past Medical History:   Diagnosis Date     Encounter for gynecological examination without abnormal finding     08,Satisfactory GYN examination     Other pulmonary embolism without acute cor pulmonale (H)     History of pulmonary emboli after      Personal history of other medical treatment (CODE)      2, para 1-0-1-1     Personal history of other medical treatment (CODE)     10/00,History of blood transfusion with      Past Surgical  History:   Procedure Laterality Date     ant/post colporr w enterocele incl cysturethroscopy  2017    Dr. Mc      SECTION      10/00 /06,History of blood transfusion with      COLONOSCOPY      ,,,F/U      COLONOSCOPY N/A 2020    Procedure: COLONOSCOPY, WITH POLYPECTOMY AND BIOPSY;  Surgeon: Rachel Acevedo MD;  Location: [facility] OR     COLONOSCOPY N/A 10/12/2020    1 sessile serrated, 1 tubular - f/u 3 years     Cryotherapy of Cervix       LAPAROSCOPIC ASSISTED HYSTERECTOMY VAGINAL, BILATERAL SALPINGO-OOPHORECTOMY, COMBINED      [facility]     LAPAROSCOPIC TUBAL LIGATION           Current Outpatient Medications   Medication Sig Dispense Refill     beclomethasone (QNASL) 80 MCG/ACT nasal aerosol Spray 2 sprays into both nostrils daily 10.6 g 11     budesonide (PULMICORT) 0.5 MG/2ML neb solution Make 240 cc Drake med sinus irrigation Mix 2 ml vial of budesonide 0.5 mg Rinse  mL 3     cetirizine-pseudoePHEDrine ER (ZYRTEC-D ALLERGY & CONGESTION) 5-120 MG 12 hr tablet TAKE 1 TABLET BY MOUTH TWICE DAILY 72 tablet 5     fluticasone (FLONASE) 50 MCG/ACT nasal spray INHALE 2 SPRAYS INTO BOTH NOSTRILS ONCE DAILY. 48 mL 11     linaclotide (LINZESS) 72 MCG capsule Take 1 capsule (72 mcg) by mouth daily 90 capsule 11     traZODone (DESYREL) 100 MG tablet TAKE 3-4 TABLETS BY MOUTH AT BEDTIME. 360 tablet 11     venlafaxine (EFFEXOR XR) 150 MG 24 hr capsule TAKE 1 CAPSULE BY MOUTH EVERY DAY WITH FOOD 90 capsule 11     venlafaxine (EFFEXOR XR) 75 MG 24 hr capsule Take 1 capsule (75 mg) by mouth daily Take with the 150 for a total of 225 mg daily. 90 capsule 11     Vitamin D, Cholecalciferol, 25 MCG (1000 UT) CAPS Take 10,000 Units by mouth daily         Allergies   Allergen Reactions     Sulfa Drugs Hives        Social History     Tobacco Use     Smoking status: Every Day     Packs/day: 0.13     Years: 30.00     Pack years: 3.90     Types: Cigarettes      "Smokeless tobacco: Never   Substance Use Topics     Alcohol use: Yes     Comment: Alcoholic Drinks/day: vodka 4-5 times a week     Family History   Problem Relation Age of Onset     Colon Cancer Father 38        Cancer-colon,Colon cancer     Other - See Comments Mother         Psychiatric illness,Untreated anxiety/D&C for postmenopausal bleeding benign     Cancer Maternal Grandfather         Cancer,Brain     Heart Disease Maternal Grandfather         Heart Disease,MI     Hypertension Maternal Grandfather         Hypertension     Other - See Comments Maternal Uncle         Hemochromatosis     Hypertension Maternal Grandmother         Hypertension     Other - See Comments Maternal Grandmother         Alzheimer's     Diabetes Other         Diabetes,Diabetes     Heart Disease Paternal Grandmother         Heart Disease,CHF     Cancer Maternal Uncle         Cancer,cancer all over.     Cancer Paternal Uncle         Cancer,Lung cancer     Breast Cancer No family hx of         Cancer-breast     History   Drug Use No     Comment: Drug use: No         Objective     /86   Pulse 88   Temp 96.9  F (36.1  C) (Temporal)   Resp 16   Ht 1.53 m (5' 0.25\")   Wt 72.6 kg (160 lb)   LMP  (LMP Unknown)   SpO2 97%   Breastfeeding No   BMI 30.99 kg/m      Physical Exam  General Appearance: Pleasant, alert, appropriate appearance for age. No acute distress  Head Exam: Normal. Normocephalic, atraumatic.  Eye Exam: PERRLA, EOMI, conjunctivae, sclerae normal.  Ear Exam: Normal TM's bilaterally. Normal auditory canals and external ears. Non-tender.  Nose Exam: Normal external nose, mucus membranes, and septum.  OroPharynx Exam:  Dental hygiene adequate. Normal buccal mucosa. Normal pharynx.  Neck Exam:  Supple, no masses or nodes. No bruits  Thyroid Exam: No nodules or enlargement.  Chest/Respiratory Exam: Normal chest wall and respirations. Clear to auscultation.  Cardiovascular Exam: Regular rate and rhythm. S1, S2, no murmur, " click, gallop, or rubs.  Gastrointestinal Exam: Soft, non-tender, no masses or organomegaly.  Lymphatic Exam: Non-palpable nodes in neck,clavicular, axillary, or inguinal regions.  Musculoskeletal Exam: Back is straight and non-tender, full ROM of upper and lower extremities.  Foot Exam: Left and right foot: good pedal pulses  Skin: no rash or abnormalities  Neurologic Exam:  normal gross motor, tone coordination and no tremor.  Psychiatric Exam: Alert and oriented - appropriate affect.     Recent Labs   Lab Test 10/24/22  0811 09/23/21  0954   HGB 14.0 14.0    305    138   POTASSIUM 3.9 3.9   CR 0.93 0.89        Diagnostics:  Recent Results (from the past 24 hour(s))   Basic metabolic panel    Collection Time: 12/13/22  4:20 PM   Result Value Ref Range    Sodium 132 (L) 136 - 145 mmol/L    Potassium 4.1 3.4 - 5.3 mmol/L    Chloride 98 98 - 107 mmol/L    Carbon Dioxide (CO2) 23 22 - 29 mmol/L    Anion Gap 11 7 - 15 mmol/L    Urea Nitrogen 8.6 6.0 - 20.0 mg/dL    Creatinine 0.65 0.51 - 0.95 mg/dL    Calcium 9.9 8.6 - 10.0 mg/dL    Glucose 102 (H) 70 - 99 mg/dL    GFR Estimate >90 >60 mL/min/1.73m2   CBC with platelets and differential    Collection Time: 12/13/22  4:20 PM   Result Value Ref Range    WBC Count 9.3 4.0 - 11.0 10e3/uL    RBC Count 4.03 3.80 - 5.20 10e6/uL    Hemoglobin 13.5 11.7 - 15.7 g/dL    Hematocrit 37.7 35.0 - 47.0 %    MCV 94 78 - 100 fL    MCH 33.5 (H) 26.5 - 33.0 pg    MCHC 35.8 31.5 - 36.5 g/dL    RDW 12.0 10.0 - 15.0 %    Platelet Count 325 150 - 450 10e3/uL    % Neutrophils 64 %    % Lymphocytes 28 %    % Monocytes 6 %    % Eosinophils 1 %    % Basophils 1 %    % Immature Granulocytes 0 %    NRBCs per 100 WBC 0 <1 /100    Absolute Neutrophils 6.0 1.6 - 8.3 10e3/uL    Absolute Lymphocytes 2.6 0.8 - 5.3 10e3/uL    Absolute Monocytes 0.5 0.0 - 1.3 10e3/uL    Absolute Eosinophils 0.1 0.0 - 0.7 10e3/uL    Absolute Basophils 0.1 0.0 - 0.2 10e3/uL    Absolute Immature Granulocytes 0.0  <=0.4 10e3/uL    Absolute NRBCs 0.0 10e3/uL      No EKG required, no history of coronary heart disease, significant arrhythmia, peripheral arterial disease or other structural heart disease.    Revised Cardiac Risk Index (RCRI):  The patient has the following serious cardiovascular risks for perioperative complications:   - No serious cardiac risks = 0 points     RCRI Interpretation: 0 points: Class I (very low risk - 0.4% complication rate)           Signed Electronically by: Rashad Adhikari MD  Copy of this evaluation report is provided to requesting physician.

## 2022-12-14 RX ORDER — FLUTICASONE PROPIONATE 50 MCG
SPRAY, SUSPENSION (ML) NASAL
Qty: 48 G | OUTPATIENT
Start: 2022-12-14

## 2022-12-14 RX ORDER — VENLAFAXINE HYDROCHLORIDE 75 MG/1
CAPSULE, EXTENDED RELEASE ORAL
Qty: 90 CAPSULE | Refills: 11 | OUTPATIENT
Start: 2022-12-14

## 2022-12-14 RX ORDER — TRAZODONE HYDROCHLORIDE 100 MG/1
TABLET ORAL
Qty: 360 TABLET | Refills: 11 | OUTPATIENT
Start: 2022-12-14

## 2022-12-14 RX ORDER — VENLAFAXINE HYDROCHLORIDE 150 MG/1
CAPSULE, EXTENDED RELEASE ORAL
Qty: 90 CAPSULE | Refills: 11 | OUTPATIENT
Start: 2022-12-14

## 2022-12-14 NOTE — TELEPHONE ENCOUNTER
[facility] sent Rx request for the following:    Redundant refill request refused: Too soon:    traZODone (DESYREL) 100 MG tablet 360 tablet 11 10/24/2022  No   Sig: TAKE 3-4 TABLETS BY MOUTH AT BEDTIME.   Sent to pharmacy as: traZODone HCl 100 MG Oral Tablet (DESYREL)   Class: E-Prescribe   Order: [id number]   E-Prescribing Status: Receipt confirmed by pharmacy (10/24/2022  8:11 AM CDT)     venlafaxine (EFFEXOR XR) 75 MG 24 hr capsule 90 capsule 11 10/24/2022  No   Sig - Route: Take 1 capsule (75 mg) by mouth daily Take with the 150 for a total of 225 mg daily. - Oral   Sent to pharmacy as: Venlafaxine HCl ER 75 MG Oral Capsule Extended Release 24 Hour (EFFEXOR XR)   Class: E-Prescribe   Order: [id number]   E-Prescribing Status: Receipt confirmed by pharmacy (10/24/2022  8:11 AM CDT)     venlafaxine (EFFEXOR XR) 150 MG 24 hr capsule 90 capsule 11 10/24/2022  No   Sig: TAKE 1 CAPSULE BY MOUTH EVERY DAY WITH FOOD   Sent to pharmacy as: Venlafaxine HCl  MG Oral Capsule Extended Release 24 Hour (EFFEXOR XR)   Class: E-Prescribe   Order: [id number]   E-Prescribing Status: Receipt confirmed by pharmacy (10/24/2022  8:11 AM CDT)     fluticasone (FLONASE) 50 MCG/ACT nasal spray 48 mL 11 10/24/2022  No   Sig: INHALE 2 SPRAYS INTO BOTH NOSTRILS ONCE DAILY.   Sent to pharmacy as: Fluticasone Propionate 50 MCG/ACT Nasal Suspension (FLONASE)   Class: E-Prescribe   Order: [id number]   E-Prescribing Status: Receipt confirmed by pharmacy (10/24/2022  8:11 AM CDT)     [id number] - [place], [place] - [address]     Jayla Noland RN .............. 12/14/2022  9:58 AM

## 2022-12-17 DIAGNOSIS — K58.9 IRRITABLE BOWEL SYNDROME, UNSPECIFIED TYPE: ICD-10-CM

## 2022-12-19 RX ORDER — LINACLOTIDE 72 UG/1
CAPSULE, GELATIN COATED ORAL
Qty: 90 CAPSULE | Refills: 11 | OUTPATIENT
Start: 2022-12-19

## 2022-12-19 NOTE — TELEPHONE ENCOUNTER
[facility] sent Rx request for the following:      Redundant refill request refused: Too soon:    linaclotide (LINZESS) 72 MCG capsule 90 capsule 11 10/24/2022  No   Sig - Route: Take 1 capsule (72 mcg) by mouth daily - Oral   Sent to pharmacy as: linaCLOtide 72 MCG Oral Capsule (LINZESS)   Class: E-Prescribe   Order: [id number]   E-Prescribing Status: Receipt confirmed by pharmacy (10/24/2022  8:11 AM CDT)     [id number] - [place], [place] - [address]     Jayla Noland RN .............. 12/19/2022  2:11 PM

## 2022-12-20 ENCOUNTER — HOSPITAL ENCOUNTER (OUTPATIENT)
Facility: HOSPITAL | Age: 47
Discharge: HOME | End: 2022-12-20
Attending: OTOLARYNGOLOGY | Admitting: OTOLARYNGOLOGY
Payer: COMMERCIAL

## 2022-12-20 ENCOUNTER — ANESTHESIA (OUTPATIENT)
Dept: SURGERY | Facility: HOSPITAL | Age: 47
End: 2022-12-20
Payer: COMMERCIAL

## 2022-12-20 VITALS
TEMPERATURE: 97.5 F | DIASTOLIC BLOOD PRESSURE: 90 MMHG | HEART RATE: 79 BPM | SYSTOLIC BLOOD PRESSURE: 146 MMHG | RESPIRATION RATE: 16 BRPM | HEIGHT: 61 IN | WEIGHT: 159 LBS | BODY MASS INDEX: 30.02 KG/M2 | OXYGEN SATURATION: 96 %

## 2022-12-20 DIAGNOSIS — J34.829 NASAL VALVE COLLAPSE: ICD-10-CM

## 2022-12-20 DIAGNOSIS — Z98.890 S/P NASAL SURGERY: Primary | ICD-10-CM

## 2022-12-20 DIAGNOSIS — J34.2 DNS (DEVIATED NASAL SEPTUM): ICD-10-CM

## 2022-12-20 DIAGNOSIS — J34.3 NASAL TURBINATE HYPERTROPHY: ICD-10-CM

## 2022-12-20 PROCEDURE — 250N000011 HC RX IP 250 OP 636: Performed by: NURSE ANESTHETIST, CERTIFIED REGISTERED

## 2022-12-20 PROCEDURE — 710N000012 HC RECOVERY PHASE 2, PER MINUTE: Performed by: OTOLARYNGOLOGY

## 2022-12-20 PROCEDURE — 31240 NSL/SNS NDSC CNCH BULL RESCJ: CPT | Mod: LT | Performed by: OTOLARYNGOLOGY

## 2022-12-20 PROCEDURE — 360N000076 HC SURGERY LEVEL 3, PER MIN: Performed by: OTOLARYNGOLOGY

## 2022-12-20 PROCEDURE — 30130 EXCISE INFERIOR TURBINATE: CPT | Mod: RT | Performed by: OTOLARYNGOLOGY

## 2022-12-20 PROCEDURE — 250N000013 HC RX MED GY IP 250 OP 250 PS 637: Performed by: OTOLARYNGOLOGY

## 2022-12-20 PROCEDURE — 30520 REPAIR OF NASAL SEPTUM: CPT | Performed by: NURSE ANESTHETIST, CERTIFIED REGISTERED

## 2022-12-20 PROCEDURE — C1726 CATH, BAL DIL, NON-VASCULAR: HCPCS | Performed by: OTOLARYNGOLOGY

## 2022-12-20 PROCEDURE — 710N000010 HC RECOVERY PHASE 1, LEVEL 2, PER MIN: Performed by: OTOLARYNGOLOGY

## 2022-12-20 PROCEDURE — 250N000009 HC RX 250: Performed by: NURSE ANESTHETIST, CERTIFIED REGISTERED

## 2022-12-20 PROCEDURE — 370N000017 HC ANESTHESIA TECHNICAL FEE, PER MIN: Performed by: OTOLARYNGOLOGY

## 2022-12-20 PROCEDURE — 250N000009 HC RX 250: Performed by: OTOLARYNGOLOGY

## 2022-12-20 PROCEDURE — C9046 COCAINE HCL NASAL SOLUTION: HCPCS | Performed by: OTOLARYNGOLOGY

## 2022-12-20 PROCEDURE — 258N000003 HC RX IP 258 OP 636: Performed by: NURSE ANESTHETIST, CERTIFIED REGISTERED

## 2022-12-20 PROCEDURE — 250N000011 HC RX IP 250 OP 636: Performed by: OTOLARYNGOLOGY

## 2022-12-20 PROCEDURE — 30140 RESECT INFERIOR TURBINATE: CPT | Mod: LT | Performed by: OTOLARYNGOLOGY

## 2022-12-20 PROCEDURE — 30520 REPAIR OF NASAL SEPTUM: CPT | Performed by: OTOLARYNGOLOGY

## 2022-12-20 PROCEDURE — 272N000001 HC OR GENERAL SUPPLY STERILE: Performed by: OTOLARYNGOLOGY

## 2022-12-20 PROCEDURE — 250N000025 HC SEVOFLURANE, PER MIN: Performed by: OTOLARYNGOLOGY

## 2022-12-20 PROCEDURE — 999N000141 HC STATISTIC PRE-PROCEDURE NURSING ASSESSMENT: Performed by: OTOLARYNGOLOGY

## 2022-12-20 RX ORDER — PROPOFOL 10 MG/ML
INJECTION, EMULSION INTRAVENOUS CONTINUOUS PRN
Status: DISCONTINUED | OUTPATIENT
Start: 2022-12-20 | End: 2022-12-20

## 2022-12-20 RX ORDER — OXYCODONE HYDROCHLORIDE 5 MG/1
5 TABLET ORAL EVERY 6 HOURS PRN
Qty: 4 TABLET | Refills: 0 | Status: SHIPPED | OUTPATIENT
Start: 2022-12-20 | End: 2022-12-23

## 2022-12-20 RX ORDER — FENTANYL CITRATE 50 UG/ML
50 INJECTION, SOLUTION INTRAMUSCULAR; INTRAVENOUS EVERY 5 MIN PRN
Status: DISCONTINUED | OUTPATIENT
Start: 2022-12-20 | End: 2022-12-20 | Stop reason: HOSPADM

## 2022-12-20 RX ORDER — NALOXONE HYDROCHLORIDE 0.4 MG/ML
0.4 INJECTION, SOLUTION INTRAMUSCULAR; INTRAVENOUS; SUBCUTANEOUS
Status: DISCONTINUED | OUTPATIENT
Start: 2022-12-20 | End: 2022-12-20 | Stop reason: HOSPADM

## 2022-12-20 RX ORDER — METOPROLOL TARTRATE 1 MG/ML
1-2 INJECTION, SOLUTION INTRAVENOUS EVERY 5 MIN PRN
Status: DISCONTINUED | OUTPATIENT
Start: 2022-12-20 | End: 2022-12-20 | Stop reason: HOSPADM

## 2022-12-20 RX ORDER — COCAINE HYDROCHLORIDE 40 MG/ML
SOLUTION NASAL
Status: DISCONTINUED
Start: 2022-12-20 | End: 2022-12-20 | Stop reason: HOSPADM

## 2022-12-20 RX ORDER — SODIUM CHLORIDE, SODIUM LACTATE, POTASSIUM CHLORIDE, CALCIUM CHLORIDE 600; 310; 30; 20 MG/100ML; MG/100ML; MG/100ML; MG/100ML
INJECTION, SOLUTION INTRAVENOUS CONTINUOUS
Status: DISCONTINUED | OUTPATIENT
Start: 2022-12-20 | End: 2022-12-20 | Stop reason: HOSPADM

## 2022-12-20 RX ORDER — ONDANSETRON 4 MG/1
4 TABLET, ORALLY DISINTEGRATING ORAL EVERY 30 MIN PRN
Status: DISCONTINUED | OUTPATIENT
Start: 2022-12-20 | End: 2022-12-20 | Stop reason: HOSPADM

## 2022-12-20 RX ORDER — OXYCODONE HYDROCHLORIDE 5 MG/1
5 TABLET ORAL ONCE
Status: COMPLETED | OUTPATIENT
Start: 2022-12-20 | End: 2022-12-20

## 2022-12-20 RX ORDER — OXYMETAZOLINE HYDROCHLORIDE 0.05 G/100ML
3 SPRAY NASAL
Status: COMPLETED | OUTPATIENT
Start: 2022-12-20 | End: 2022-12-20

## 2022-12-20 RX ORDER — FENTANYL CITRATE 50 UG/ML
25 INJECTION, SOLUTION INTRAMUSCULAR; INTRAVENOUS EVERY 5 MIN PRN
Status: DISCONTINUED | OUTPATIENT
Start: 2022-12-20 | End: 2022-12-20 | Stop reason: HOSPADM

## 2022-12-20 RX ORDER — NALOXONE HYDROCHLORIDE 0.4 MG/ML
0.2 INJECTION, SOLUTION INTRAMUSCULAR; INTRAVENOUS; SUBCUTANEOUS
Status: DISCONTINUED | OUTPATIENT
Start: 2022-12-20 | End: 2022-12-20 | Stop reason: HOSPADM

## 2022-12-20 RX ORDER — BUDESONIDE 0.5 MG/2ML
INHALANT ORAL
Qty: 120 ML | Refills: 11 | Status: SHIPPED | OUTPATIENT
Start: 2022-12-20 | End: 2023-04-13

## 2022-12-20 RX ORDER — DEXAMETHASONE SODIUM PHOSPHATE 10 MG/ML
INJECTION, SOLUTION INTRAMUSCULAR; INTRAVENOUS PRN
Status: DISCONTINUED | OUTPATIENT
Start: 2022-12-20 | End: 2022-12-20

## 2022-12-20 RX ORDER — FENTANYL CITRATE 50 UG/ML
INJECTION, SOLUTION INTRAMUSCULAR; INTRAVENOUS PRN
Status: DISCONTINUED | OUTPATIENT
Start: 2022-12-20 | End: 2022-12-20

## 2022-12-20 RX ORDER — COCAINE HYDROCHLORIDE 40 MG/ML
SOLUTION NASAL PRN
Status: DISCONTINUED | OUTPATIENT
Start: 2022-12-20 | End: 2022-12-20 | Stop reason: HOSPADM

## 2022-12-20 RX ORDER — FENTANYL CITRATE 50 UG/ML
50 INJECTION, SOLUTION INTRAMUSCULAR; INTRAVENOUS
Status: DISCONTINUED | OUTPATIENT
Start: 2022-12-20 | End: 2022-12-20 | Stop reason: HOSPADM

## 2022-12-20 RX ORDER — PREDNISONE 20 MG/1
TABLET ORAL
Qty: 3 TABLET | Refills: 1 | Status: SHIPPED | OUTPATIENT
Start: 2022-12-22 | End: 2023-07-20

## 2022-12-20 RX ORDER — CEFAZOLIN SODIUM 1 G/3ML
INJECTION, POWDER, FOR SOLUTION INTRAMUSCULAR; INTRAVENOUS
Status: DISCONTINUED
Start: 2022-12-20 | End: 2022-12-20 | Stop reason: WASHOUT

## 2022-12-20 RX ORDER — HYDROMORPHONE HYDROCHLORIDE 1 MG/ML
0.4 INJECTION, SOLUTION INTRAMUSCULAR; INTRAVENOUS; SUBCUTANEOUS EVERY 5 MIN PRN
Status: DISCONTINUED | OUTPATIENT
Start: 2022-12-20 | End: 2022-12-20 | Stop reason: HOSPADM

## 2022-12-20 RX ORDER — KETAMINE HYDROCHLORIDE 10 MG/ML
INJECTION INTRAMUSCULAR; INTRAVENOUS PRN
Status: DISCONTINUED | OUTPATIENT
Start: 2022-12-20 | End: 2022-12-20

## 2022-12-20 RX ORDER — LIDOCAINE 40 MG/G
CREAM TOPICAL
Status: DISCONTINUED | OUTPATIENT
Start: 2022-12-20 | End: 2022-12-20 | Stop reason: HOSPADM

## 2022-12-20 RX ORDER — ONDANSETRON 2 MG/ML
4 INJECTION INTRAMUSCULAR; INTRAVENOUS EVERY 30 MIN PRN
Status: DISCONTINUED | OUTPATIENT
Start: 2022-12-20 | End: 2022-12-20 | Stop reason: HOSPADM

## 2022-12-20 RX ORDER — HYDROMORPHONE HYDROCHLORIDE 1 MG/ML
0.2 INJECTION, SOLUTION INTRAMUSCULAR; INTRAVENOUS; SUBCUTANEOUS EVERY 5 MIN PRN
Status: DISCONTINUED | OUTPATIENT
Start: 2022-12-20 | End: 2022-12-20 | Stop reason: HOSPADM

## 2022-12-20 RX ORDER — ONDANSETRON 2 MG/ML
INJECTION INTRAMUSCULAR; INTRAVENOUS PRN
Status: DISCONTINUED | OUTPATIENT
Start: 2022-12-20 | End: 2022-12-20

## 2022-12-20 RX ORDER — PROPOFOL 10 MG/ML
INJECTION, EMULSION INTRAVENOUS PRN
Status: DISCONTINUED | OUTPATIENT
Start: 2022-12-20 | End: 2022-12-20

## 2022-12-20 RX ORDER — ALBUTEROL SULFATE 0.83 MG/ML
2.5 SOLUTION RESPIRATORY (INHALATION) EVERY 4 HOURS PRN
Status: DISCONTINUED | OUTPATIENT
Start: 2022-12-20 | End: 2022-12-20 | Stop reason: HOSPADM

## 2022-12-20 RX ORDER — LIDOCAINE HYDROCHLORIDE 20 MG/ML
INJECTION, SOLUTION INFILTRATION; PERINEURAL PRN
Status: DISCONTINUED | OUTPATIENT
Start: 2022-12-20 | End: 2022-12-20

## 2022-12-20 RX ADMIN — HYDROMORPHONE HYDROCHLORIDE 0.2 MG: 1 INJECTION, SOLUTION INTRAMUSCULAR; INTRAVENOUS; SUBCUTANEOUS at 15:33

## 2022-12-20 RX ADMIN — PROPOFOL 40 MG: 10 INJECTION, EMULSION INTRAVENOUS at 13:38

## 2022-12-20 RX ADMIN — OXYMETAZOLINE HCL 3 SPRAY: 0.05 SPRAY NASAL at 10:31

## 2022-12-20 RX ADMIN — MIDAZOLAM 2 MG: 1 INJECTION INTRAMUSCULAR; INTRAVENOUS at 13:13

## 2022-12-20 RX ADMIN — Medication 25 MG: at 13:17

## 2022-12-20 RX ADMIN — OXYCODONE HYDROCHLORIDE 5 MG: 5 TABLET ORAL at 16:19

## 2022-12-20 RX ADMIN — FENTANYL CITRATE 50 MCG: 50 INJECTION INTRAMUSCULAR; INTRAVENOUS at 14:59

## 2022-12-20 RX ADMIN — LIDOCAINE HYDROCHLORIDE 80 MG: 20 INJECTION, SOLUTION INFILTRATION; PERINEURAL at 13:18

## 2022-12-20 RX ADMIN — DEXAMETHASONE SODIUM PHOSPHATE 12 MG: 10 INJECTION, SOLUTION INTRAMUSCULAR; INTRAVENOUS at 13:32

## 2022-12-20 RX ADMIN — OXYMETAZOLINE HCL 3 SPRAY: 0.05 SPRAY NASAL at 10:40

## 2022-12-20 RX ADMIN — PROPOFOL 100 MCG/KG/MIN: 10 INJECTION, EMULSION INTRAVENOUS at 13:21

## 2022-12-20 RX ADMIN — Medication 100 MG: at 13:19

## 2022-12-20 RX ADMIN — Medication 15 MG: at 14:05

## 2022-12-20 RX ADMIN — ONDANSETRON 4 MG: 2 INJECTION INTRAMUSCULAR; INTRAVENOUS at 13:32

## 2022-12-20 RX ADMIN — FENTANYL CITRATE 50 MCG: 50 INJECTION INTRAMUSCULAR; INTRAVENOUS at 15:19

## 2022-12-20 RX ADMIN — FENTANYL CITRATE 100 MCG: 50 INJECTION, SOLUTION INTRAMUSCULAR; INTRAVENOUS at 13:18

## 2022-12-20 RX ADMIN — FENTANYL CITRATE 50 MCG: 50 INJECTION INTRAMUSCULAR; INTRAVENOUS at 15:06

## 2022-12-20 RX ADMIN — PROPOFOL 150 MG: 10 INJECTION, EMULSION INTRAVENOUS at 13:18

## 2022-12-20 RX ADMIN — OXYMETAZOLINE HCL 3 SPRAY: 0.05 SPRAY NASAL at 10:50

## 2022-12-20 RX ADMIN — SODIUM CHLORIDE, POTASSIUM CHLORIDE, SODIUM LACTATE AND CALCIUM CHLORIDE: 600; 310; 30; 20 INJECTION, SOLUTION INTRAVENOUS at 10:46

## 2022-12-20 RX ADMIN — ROCURONIUM BROMIDE 5 MG: 10 INJECTION INTRAVENOUS at 13:18

## 2022-12-20 RX ADMIN — PROPOFOL 50 MG: 10 INJECTION, EMULSION INTRAVENOUS at 13:19

## 2022-12-20 ASSESSMENT — ACTIVITIES OF DAILY LIVING (ADL)
ADLS_ACUITY_SCORE: 35

## 2022-12-20 NOTE — ANESTHESIA PROCEDURE NOTES
Airway       Patient location during procedure: OR       Procedure Start/Stop Times: 12/20/2022 1:20 PM  Staff -        CRNA: Claudia Sol APRN CRNA       Performed By: CRNA  Consent for Airway        Urgency: elective  Indications and Patient Condition       Indications for airway management: pratik-procedural       Induction type:intravenous       Mask difficulty assessment: 1 - vent by mask    Final Airway Details       Final airway type: endotracheal airway       Successful airway: ETT - single and WILLIAM  Endotracheal Airway Details        ETT size (mm): 7.5       Cuffed: yes       Cuff volume (mL): 4       Successful intubation technique: direct laryngoscopy       DL Blade Type: Marcos 2       Grade View of Cords: 1       Adjucts: stylet       Position: Center       Measured from: gums/teeth       Secured at (cm): 21       Bite block used: None    Post intubation assessment        Placement verified by: capnometry, equal breath sounds and chest rise        Number of attempts at approach: 1       Secured with: silk tape       Ease of procedure: easy       Dentition: Intact    Medication(s) Administered   Medication Administration Time: 12/20/2022 1:20 PM

## 2022-12-20 NOTE — OR NURSING
Patient and responsible adult given discharge instructions with no questions regarding instructions. Min score 18/20. Pain level 7/10.  Medicate with oral pain pill prior to discharge.  Discharged from unit via wheelchair. Patient discharged to home with spouse.

## 2022-12-20 NOTE — OP NOTE
Otolaryngology Operative Note     Pre-op Diagnosis: Deviated nasal septum bilateral inferior turbinate hypertrophy, left kiersten bullosa    Post-op Diagnosis:  Same    Procedures:    1.  Endoscopic septoplasty  2.  Bilateral submucosal reduction inferior turbinates with partial excision of the right inferior turbinate  3.  Excision left kiersten bullosa    Surgeon:  Laurie Stoddard D.O.  Anesthesia:  General endotracheal  EBL:  5 ml  Findings: preop spur left floor, mid to superior deviation right  Complications:  none  Condition:  stable     Description of the Procedure  After surgical consent was obtained the patient was brought back to the operating room and laid in a comfortable and supine position.  The patient was administered a general anesthetic by a member of anesthesia.  The table was turned 180 degrees.  The patient was draped in the normal clean fashion and a timeout was taken.  The bed was placed into reverse Trendelenburg positioning.  A timeout was taken.  Cocaine pledgets were placed into the nares for several minutes and removed.  The septum, left middle turbinate, inferior turbinates were anesthetized with 1% lidocaine with 1-100,000 epinephrine.      I proceeded with the submucosal reduction of the inferior turbinates. Under endoscopy, I used a 2mm inferior turbinate blade and started on the right side. I made a stab incision at the anterior insertion of the left inferior turbinate and raised a submucoperiosteal tunnel along the medial surface of the inferior turbinate bone. I then slowly withdrew the shaver blade as I ran the shaver to perform my submucous resection.  Careful attention was turned to the area of the internal nasal valve for complete reduction.  The turbinate was then outfractured with a Tecumseh.  There is still considerable turbinate hypertrophy therefore I used a 15 blade to make an incision along the floor of the turbinate and elevated the mucosal flaps with a Tecumseh.  I used  straight and angled consul scissors to remove the inferior portion of the turbinate.  The turbinate was outfractured with a Carson and hemostasis was achieved with the use of silver nitrate and is adequate.  The significantly improves the airway.  I then performed the submucosal reduction in a similar fashion on the left side  with outfracture.    I then used a 0 degree endoscope and a sickle blade to incise the left kiersten bullosa.  I used consul scissors to excise the lateral portion of the kiersten and hemostasis was achieved with silver nitrate.    Next I turned my attention to the septoplasty.  I used the tract balloon which was placed along the floor of the left nares.  The balloon was inflated to 8 mm atmospheric pressure and held in place for 2 minutes.  It was deflated and removed.  Using a 0 degree endoscope I then made an incision along the floor of the right nose and elevated the mucosal flaps with a Rajat.  I used a V chisel and Blakesley to remove a cartilaginous spur along with the left floor.  The mucosal flaps were reapproximated the septum is intact and midline.  I then similarly used the tract balloon on the right side and after 2 minutes removed the balloon.  Under endoscopy I made a mid hemitransfixion incision on the right side and elevated a mucoperichondrial and periosteal flap bilaterally.  I used a D knife to separate the deviated portion of the quadrangular cartilage from the vomer and perpendicular plate of the ethmoid preserving over 1.5 cm of an intact dorsal caudal strut.  A portion of the cartilage was placed back into the midline pocket went strange.  After removal of the superior and mid deviated quadrangular cartilage the mucosal flaps were reapproximated with a Carson the septum is midline and intact.  The incision was closed with 4-0 Vicryl in an interrupted fashion.  Hemostasis is achieved at the turbinate reduction sites with silver nitrate and is adequate.  The nose was  irrigated and gently suctioned.  She has had recovered anesthesia was awakened and brought to the recovery room in stable condition having tolerated the procedure well.

## 2022-12-20 NOTE — ANESTHESIA POSTPROCEDURE EVALUATION
Patient: Radha Ruiz    Procedure: Procedure(s):  Septoplasty, Bilateral turbinate reduction, excision left kiersten bullosa       Anesthesia Type:  General    Note:  Disposition: Outpatient   Postop Pain Control: Uneventful            Sign Out: Well controlled pain   PONV: No   Neuro/Psych: Uneventful            Sign Out: Acceptable/Baseline neuro status   Airway/Respiratory: Uneventful            Sign Out: Acceptable/Baseline resp. status   CV/Hemodynamics: Uneventful            Sign Out: Acceptable CV status; No obvious hypovolemia; No obvious fluid overload   Other NRE: NONE   DID A NON-ROUTINE EVENT OCCUR? No           Last vitals:  Vitals Value Taken Time   /86 12/20/22 1550   Temp 98  F (36.7  C) 12/20/22 1550   Pulse 76 12/20/22 1554   Resp 11 12/20/22 1554   SpO2 100 % 12/20/22 1554   Vitals shown include unvalidated device data.    Electronically Signed By: JOSE Harris CRNA  December 20, 2022  4:52 PM

## 2022-12-20 NOTE — ANESTHESIA CARE TRANSFER NOTE
Patient: Radha Ruiz    Procedure: Procedure(s):  Septoplasty, Bilateral turbinate reduction, excision left kiersten bullosa       Diagnosis: Deviated nasal septum [J34.2]  Nasal turbinate hypertrophy [J34.3]  Nasal valve collapse [J34.89]  Diagnosis Additional Information: No value filed.    Anesthesia Type:   General     Note:    Oropharynx: oropharynx clear of all foreign objects and spontaneously breathing  Level of Consciousness: drowsy  Oxygen Supplementation: face mask  Level of Supplemental Oxygen (L/min / FiO2): 6  Independent Airway: airway patency satisfactory and stable  Dentition: dentition unchanged  Vital Signs Stable: post-procedure vital signs reviewed and stable  Report to RN Given: handoff report given  Patient transferred to: PACU    Handoff Report: Identifed the Patient, Identified the Reponsible Provider, Reviewed the pertinent medical history, Discussed the surgical course, Reviewed Intra-OP anesthesia mangement and issues during anesthesia, Set expectations for post-procedure period and Allowed opportunity for questions and acknowledgement of understanding      Vitals:  Vitals Value Taken Time   BP     Temp     Pulse     Resp     SpO2         Electronically Signed By: JOSE CLAY CRNA  December 20, 2022  2:44 PM

## 2022-12-20 NOTE — DISCHARGE INSTRUCTIONS
Instructions for Nasal Surgery    Recovery - Everyone recovers differently from a general anesthetic.  Symptoms such as fatigue, nausea, light-headedness, and sometimes a low grade fever (up to 100 degrees) are not unusual.  As your body removes the anesthetic drugs from circulation, these symptoms will resolve.  Your nose will be sore after surgery, and you may even have symptoms similar to a sinus infection with headache, congestion, and pressure.  These will resolve with healing.  For several days you may experience bloody drainage from the nose, please use the drip pad as necessary for this.  If there is persistent bleeding, please call the office during business hours or the on call ENT physician after hours.  There are no diet restrictions after sinus surgery, and you can resume your home medications.      Please do not blow your nose until 2 weeks after surgery.       Limit your activity to no strenuous activities until I see you for the first follow-up visit in approximately 2 weeks.      Medications - You were sent home with narcotic pain medication.  If you can tolerate the discomfort during your recovery by using just plain Tylenol or ibuprofen (advil), please do so.  However, do not hesitate to use the stronger pain medication if needed.  If you were sent home with an antibiotic, it is primarily used to help the healing process.  If it causes loose bowel movements or other signs of intolerance, it is appropriate to discontinue it.  By far the most important measure you can take to speed recovery, and maximize the chances of long term success of sinus surgery is using the sinus rinses at least three time per day for the first month after surgery.       Start budesonide irrigations tomorrow.  Use 2 times daily for one month and then as directed.  Start Drake Med saline irrigation tonight and use at least 3 times daily.   Continue twice daily budesonide irrigations and 2-3 times daily NeilMed saline  irrigations for 1 month and then as directed by Dr. Stoddard    Budesonide instructions  Make the saline solution using 2 packages of salt and previously boiled or distilled water.  This will make 240 ml of saline solution.  Mix the entire vial of budesonide into the solution.   Irrigate your nose 2 times a day with the warm budesonide solution using 1 bottle between nostrils in the morning, and one bottle at night.   Use plain ansley med saline without the steroid 2-3 times during the afternoon    Perform gentle irrigation for the first week.  Starting 1 week after surgery, you can start to irrigate a bit more forcefully.  The more often you irrigate with the ansley med saline, the faster you will heal.      At 3 weeks after surgery you may restart nasal steroids if needed (flonase, nasonex, etc).      Complications - Problems related to sinus surgery almost always are detected during the operation, and special instruction will be given in that situation.  However, unexpected things can happen, and are all related to the structures around the sinus cavities.  Symptoms that should alert you to a possible problem include: severe eye pain or eye swelling, persistent heavy bleeding from the nose, and high fevers with headache and neck pain.  Any of these symptoms should be called into my office or to the on call ENT if after hours.  The most common non-emergency complication of sinus surgery is the formation of scar tissue which can re-block the sinuses.  This is addressed below.    Follow-up -   see YOSEPH Watkins or Kelly Alexander NP in 1-3 weeks.  These visits will require the examination of your nose and possibly removal of crusts of dry mucous and blood, with possible removal of early scar tissue.  Please prepare for these visits by using your sinus rinses.    If there are any questions or issues with the above, or if there are other issues that concern you, always feel free to call the clinic and I am happy to  speak with you as soon as I can.    Laurie Stoddard D.O.  Otolaryngology/Head and Neck Surgery  Allergy    [phone]   extension [phone]              Post-Anesthesia Patient Instructions    IMMEDIATELY FOLLOWING SURGERY:  Do not drive or operate machinery for the first twenty four hours after surgery.  Do not make any important decisions for twenty four hours after surgery or while taking narcotic pain medications or sedatives.  If you develop intractable nausea and vomiting or a severe headache please notify your doctor immediately.    FOLLOW-UP:  Please make an appointment with your surgeon as instructed. You do not need to follow up with anesthesia unless specifically instructed to do so.    WOUND CARE INSTRUCTIONS (if applicable):  Keep a dry clean dressing on the anesthesia/puncture wound site if there is drainage.  Once the wound has quit draining you may leave it open to air.  Generally you should leave the bandage intact for twenty four hours unless there is drainage.  If the epidural site drains for more than 36-48 hours please call the anesthesia department.    QUESTIONS?:  Please feel free to call your physician or the hospital  if you have any questions, and they will be happy to assist you.

## 2022-12-20 NOTE — INTERVAL H&P NOTE
"I have reviewed the surgical (or preoperative) H&P that is linked to this encounter, and examined the patient. There are no significant changes    Clinical Conditions Present on Arrival:  Clinically Significant Risk Factors Present on Admission           # Hyponatremia: Lowest Na = 132 mmol/L in last 30 days, will monitor as appropriate          # Obesity: Estimated body mass index is 30.99 kg/m  as calculated from the following:    Height as of 12/13/22: 1.53 m (5' 0.25\").    Weight as of 12/13/22: 72.6 kg (160 lb).       "

## 2023-01-03 ENCOUNTER — OFFICE VISIT (OUTPATIENT)
Dept: OTOLARYNGOLOGY | Facility: OTHER | Age: 48
End: 2023-01-03
Attending: PHYSICIAN ASSISTANT
Payer: COMMERCIAL

## 2023-01-03 VITALS
TEMPERATURE: 97.7 F | HEART RATE: 99 BPM | WEIGHT: 159 LBS | OXYGEN SATURATION: 99 % | SYSTOLIC BLOOD PRESSURE: 124 MMHG | BODY MASS INDEX: 30.02 KG/M2 | HEIGHT: 61 IN | DIASTOLIC BLOOD PRESSURE: 78 MMHG

## 2023-01-03 DIAGNOSIS — Z98.890 S/P NASAL SURGERY: ICD-10-CM

## 2023-01-03 DIAGNOSIS — Z98.890 S/P NASAL SEPTOPLASTY: Primary | ICD-10-CM

## 2023-01-03 PROCEDURE — 99024 POSTOP FOLLOW-UP VISIT: CPT | Performed by: PHYSICIAN ASSISTANT

## 2023-01-03 ASSESSMENT — PAIN SCALES - GENERAL: PAINLEVEL: MODERATE PAIN (4)

## 2023-01-03 NOTE — PROGRESS NOTES
Chief Complaint   Patient presents with     Surgical Followup     S/P septoplasty, TR, excision left kiersten bullosa 22     History of Present Illness - Radha Ruiz is a 47 year old female who is status post septoplasty on 22.  The patient tells me that other than the sense of congestion, they have had no problems.    No headaches,  chills, or change in vision. Reports intermittent low grade temps.   She has been doing well. Reports her nose has improved. She has been able to breath better since her surgery.   No concerns with postop pain.   She has been rinsing 5 times daily.       Operative- 22  Post-op Diagnosis:  Same  Procedures:    1.  Endoscopic septoplasty  2.  Bilateral submucosal reduction inferior turbinates with partial excision of the right inferior turbinate  3.  Excision left kiersten bullosa     Surgeon:  Laurie Stoddard D.O.  Anesthesia:  General endotracheal  EBL:  5 ml  Findings: preop spur left floor, mid to superior deviation right  Complications:  none  Condition:  stable     Past Medical History:   Diagnosis Date     Encounter for gynecological examination without abnormal finding     08,Satisfactory GYN examination     Other pulmonary embolism without acute cor pulmonale (H)     History of pulmonary emboli after      Personal history of other medical treatment (CODE)      2, para 1-0-1-1     Personal history of other medical treatment (CODE)     10/00,History of blood transfusion with         Allergies   Allergen Reactions     Sulfa Drugs Hives     Current Outpatient Medications   Medication     beclomethasone (QNASL) 80 MCG/ACT nasal aerosol     budesonide (PULMICORT) 0.5 MG/2ML neb solution     cetirizine-pseudoePHEDrine ER (ZYRTEC-D ALLERGY & CONGESTION) 5-120 MG 12 hr tablet     fluticasone (FLONASE) 50 MCG/ACT nasal spray     linaclotide (LINZESS) 72 MCG capsule     predniSONE (DELTASONE) 20 MG tablet     traZODone (DESYREL) 100  "MG tablet     venlafaxine (EFFEXOR XR) 150 MG 24 hr capsule     venlafaxine (EFFEXOR XR) 75 MG 24 hr capsule     Vitamin D, Cholecalciferol, 25 MCG (1000 UT) CAPS     No current facility-administered medications for this visit.     ROS- SEE HPI  /78 (Cuff Size: Adult Regular)   Pulse 99   Temp 97.7  F (36.5  C) (Tympanic)   Ht 1.537 m (5' 0.5\")   Wt 72.1 kg (159 lb)   LMP  (LMP Unknown)   SpO2 99%   BMI 30.54 kg/m      General - The patient is well nourished and well developed, and appears to have good nutritional status.  Alert and oriented to person and place, answers questions and cooperates with examination appropriately.   Head and Face - Normocephalic and atraumatic, with no gross asymmetry noted of the contour of the facial features.  The facial nerve is intact, with strong symmetric movements.  Eyes - Extraocular movements intact, and the pupils were reactive to light.  Sclera were not icteric or injected, conjunctiva were pink and moist.  Mouth - Examination of the oral cavity shows pink, healthy, moist mucosa.  No lesions or ulceration noted.  The dentition are in good repair.  The tongue is mobile and midline.    To evaluate the nose and sinuses in the post operative state, I performed rigid nasal endoscopy. The nose was anesthetized with home afrin or topical lidocaine and neosynephrine in the office.    I began with the LEFT side using a 0 degree rigid nasal endoscope, and then similarly examined the RIGHT side    Findings:  Septum: Midline, healing well.   Inferior turbinates:  Reduced, suctioned. There was a small adhesion which was reduced.  Along left to inferior floor.  Middle turbinate and middle meatus:  No purulence, no polyposis, no synechiae.  Middle turbinate left [place] used to lateralize  Mucosa is  healthy throughout without polyps nor polypoid degeneration      A/P -     ICD-10-CM    1. S/P nasal septoplasty  Z98.890       2. S/P nasal turbinate reduction   Z98.890     "       Radha is doing very well since her surgery and happy with her results thus far.  She is recommended to continue with rinses as directed.  Return to ENT in 2 to 3 weeks for repeat exam.  Consider allergy testing in future.  Radha Ruiz has had a nice result from septoplasty.  The position of the septum and nasal tip look good.  I will see the patient in one month for another follow up.  I cautioned them to avoid any trauma to the nose, hard blowing, or twisting.      Jena Breen PA-C  ENT  [facility], [place]

## 2023-01-03 NOTE — LETTER
1/3/2023         RE: Radha Ruiz  [address] [address] [address]        Dear Colleague,    Thank you for referring your patient, Radha Ruiz, to the [facility]. Please see a copy of my visit note below.    Chief Complaint   Patient presents with     Surgical Followup     S/P septoplasty, TR, excision left kiersten bullosa 22     History of Present Illness - Radha Ruiz is a 47 year old female who is status post septoplasty on 22.  The patient tells me that other than the sense of congestion, they have had no problems.    No headaches,  chills, or change in vision. Reports intermittent low grade temps.   She has been doing well. Reports her nose has improved. She has been able to breath better since her surgery.   No concerns with postop pain.   She has been rinsing 5 times daily.       Operative- 22  Post-op Diagnosis:  Same  Procedures:    1.  Endoscopic septoplasty  2.  Bilateral submucosal reduction inferior turbinates with partial excision of the right inferior turbinate  3.  Excision left kiersten bullosa     Surgeon:  Laurie Stoddard D.O.  Anesthesia:  General endotracheal  EBL:  5 ml  Findings: preop spur left floor, mid to superior deviation right  Complications:  none  Condition:  stable     Past Medical History:   Diagnosis Date     Encounter for gynecological examination without abnormal finding     08,Satisfactory GYN examination     Other pulmonary embolism without acute cor pulmonale (H)     History of pulmonary emboli after      Personal history of other medical treatment (CODE)      2, para 1-0-1-1     Personal history of other medical treatment (CODE)     10/00,History of blood transfusion with         Allergies   Allergen Reactions     Sulfa Drugs Hives     Current Outpatient Medications   Medication     beclomethasone (QNASL) 80 MCG/ACT nasal aerosol     budesonide (PULMICORT) 0.5 MG/2ML neb  "solution     cetirizine-pseudoePHEDrine ER (ZYRTEC-D ALLERGY & CONGESTION) 5-120 MG 12 hr tablet     fluticasone (FLONASE) 50 MCG/ACT nasal spray     linaclotide (LINZESS) 72 MCG capsule     predniSONE (DELTASONE) 20 MG tablet     traZODone (DESYREL) 100 MG tablet     venlafaxine (EFFEXOR XR) 150 MG 24 hr capsule     venlafaxine (EFFEXOR XR) 75 MG 24 hr capsule     Vitamin D, Cholecalciferol, 25 MCG (1000 UT) CAPS     No current facility-administered medications for this visit.     ROS- SEE HPI  /78 (Cuff Size: Adult Regular)   Pulse 99   Temp 97.7  F (36.5  C) (Tympanic)   Ht 1.537 m (5' 0.5\")   Wt 72.1 kg (159 lb)   LMP  (LMP Unknown)   SpO2 99%   BMI 30.54 kg/m      General - The patient is well nourished and well developed, and appears to have good nutritional status.  Alert and oriented to person and place, answers questions and cooperates with examination appropriately.   Head and Face - Normocephalic and atraumatic, with no gross asymmetry noted of the contour of the facial features.  The facial nerve is intact, with strong symmetric movements.  Eyes - Extraocular movements intact, and the pupils were reactive to light.  Sclera were not icteric or injected, conjunctiva were pink and moist.  Mouth - Examination of the oral cavity shows pink, healthy, moist mucosa.  No lesions or ulceration noted.  The dentition are in good repair.  The tongue is mobile and midline.    To evaluate the nose and sinuses in the post operative state, I performed rigid nasal endoscopy. The nose was anesthetized with home afrin or topical lidocaine and neosynephrine in the office.    I began with the LEFT side using a 0 degree rigid nasal endoscope, and then similarly examined the RIGHT side    Findings:  Septum: Midline, healing well.   Inferior turbinates:  Reduced, suctioned. There was a small adhesion which was reduced.  Along left to inferior floor.  Middle turbinate and middle meatus:  No purulence, no polyposis, " no synechiae.  Middle turbinate left [place] used to lateralize  Mucosa is  healthy throughout without polyps nor polypoid degeneration      A/P -     ICD-10-CM    1. S/P nasal septoplasty  Z98.890       2. S/P nasal turbinate reduction   Z98.890           Radha is doing very well since her surgery and happy with her results thus far.  She is recommended to continue with rinses as directed.  Return to ENT in 2 to 3 weeks for repeat exam.  Consider allergy testing in future.  Radha Ruiz has had a nice result from septoplasty.  The position of the septum and nasal tip look good.  I will see the patient in one month for another follow up.  I cautioned them to avoid any trauma to the nose, hard blowing, or twisting.      Jena Breen PA-C  ENT  [facility], [place]          Again, thank you for allowing me to participate in the care of your patient.        Sincerely,        Jena Breen PA-C

## 2023-01-03 NOTE — PATIENT INSTRUCTIONS
Maintain postoperative instructions.   Nose is healing very nice.     Continue w/ Budesonide rinse 2 times daily.   Continue with Drake med rinse 1-2 times daily.  Follow up as scheduled on 1/18/23    Thank you for allowing Jena Breen PA-C and our ENT team to participate in your care.  If your medications are too expensive, please give the nurse a call.  We can possibly change this medication.  If you have a scheduling or an appointment question please contact our Health Unit Coordinator at [phone], Ext. [phone].    ALL nursing questions or concerns can be directed to your ENT nurse at: [phone] [place]

## 2023-01-17 NOTE — PROGRESS NOTES
Chief Complaint   Patient presents with     Surgical Followup     S/P septoplasty, TR, and excision left kiersten bullosa 22       History of Present Illness - Radha Ruiz is a 47 year old female who is status post septoplasty on 22.  The patient tells me that other than the sense of congestion, they have had no problems.    No headaches,  chills, or change in vision. Reports intermittent low grade temps.   She has been doing well. Reports her nose has improved. She has been able to breath better since her surgery.        Operative- 22  Post-op Diagnosis:  Same  Procedures:    1.  Endoscopic septoplasty  2.  Bilateral submucosal reduction inferior turbinates with partial excision of the right inferior turbinate  3.  Excision left kiersten bullosa     Surgeon:  Laurie Stoddard D.O.  Anesthesia:  General endotracheal  EBL:  5 ml  Findings: preop spur left floor, mid to superior deviation right  Complications:  none  Condition:  stable        Past Medical History:   Diagnosis Date     Encounter for gynecological examination without abnormal finding     08,Satisfactory GYN examination     Other pulmonary embolism without acute cor pulmonale (H)     History of pulmonary emboli after      Personal history of other medical treatment (CODE)      2, para 1-0-1-1     Personal history of other medical treatment (CODE)     10/00,History of blood transfusion with         Allergies   Allergen Reactions     Sulfa Drugs Hives     Current Outpatient Medications   Medication     beclomethasone (QNASL) 80 MCG/ACT nasal aerosol     budesonide (PULMICORT) 0.5 MG/2ML neb solution     cetirizine-pseudoePHEDrine ER (ZYRTEC-D ALLERGY & CONGESTION) 5-120 MG 12 hr tablet     fluticasone (FLONASE) 50 MCG/ACT nasal spray     linaclotide (LINZESS) 72 MCG capsule     predniSONE (DELTASONE) 20 MG tablet     traZODone (DESYREL) 100 MG tablet     venlafaxine (EFFEXOR XR) 150 MG 24 hr capsule  "    venlafaxine (EFFEXOR XR) 75 MG 24 hr capsule     Vitamin D, Cholecalciferol, 25 MCG (1000 UT) CAPS     No current facility-administered medications for this visit.     ROS- SEE HPI  /74 (Cuff Size: Adult Regular)   Pulse 80   Temp 97.3  F (36.3  C) (Tympanic)   Ht 1.537 m (5' 0.51\")   Wt 72.1 kg (159 lb)   LMP  (LMP Unknown)   SpO2 99%   BMI 30.53 kg/m      General - The patient is well nourished and well developed, and appears to have good nutritional status.  Alert and oriented to person and place, answers questions and cooperates with examination appropriately.   Head and Face - Normocephalic and atraumatic, with no gross asymmetry noted of the contour of the facial features.  The facial nerve is intact, with strong symmetric movements.  Eyes - Extraocular movements intact, and the pupils were reactive to light.  Sclera were not icteric or injected, conjunctiva were pink and moist.  Mouth - Examination of the oral cavity shows pink, healthy, moist mucosa.  No lesions or ulceration noted.  The dentition are in good repair.  The tongue is mobile and midline.     To evaluate the nose and sinuses in the post operative state, I performed rigid nasal endoscopy. The nose was anesthetized with home afrin or topical lidocaine and neosynephrine in the office.     I began with the LEFT side using a 0 degree rigid nasal endoscope, and then similarly examined the RIGHT side     Findings:  Septum: Midline, healing well.   Inferior turbinates:  Reduced, suctioned. No adhesions noted.   Middle turbinate and middle meatus:  No purulence, no polyposis, no synechiae.  Middle turbinate left [place] used to lateralize  Mucosa is  healthy throughout without polyps nor polypoid degeneration          ASSESSMENT/ PLAN:    ICD-10-CM    1. S/P nasal septoplasty  Z98.890       2. S/P nasal turbinate reduction   Z98.890         Radha is doing very well since her surgery and happy with her results thus far.  She is recommended " to continue with rinses 1-2 times daily.   May use Flonase.       Consider allergy testing in future.        Jena Breen PA-C  ENT  [facility], [place]

## 2023-01-18 ENCOUNTER — OFFICE VISIT (OUTPATIENT)
Dept: OTOLARYNGOLOGY | Facility: OTHER | Age: 48
End: 2023-01-18
Attending: PHYSICIAN ASSISTANT
Payer: COMMERCIAL

## 2023-01-18 VITALS
HEART RATE: 80 BPM | HEIGHT: 61 IN | SYSTOLIC BLOOD PRESSURE: 128 MMHG | BODY MASS INDEX: 30.02 KG/M2 | WEIGHT: 159 LBS | TEMPERATURE: 97.3 F | OXYGEN SATURATION: 99 % | DIASTOLIC BLOOD PRESSURE: 74 MMHG

## 2023-01-18 DIAGNOSIS — Z98.890 S/P NASAL SURGERY: ICD-10-CM

## 2023-01-18 DIAGNOSIS — Z98.890 S/P NASAL SEPTOPLASTY: Primary | ICD-10-CM

## 2023-01-18 PROCEDURE — 99024 POSTOP FOLLOW-UP VISIT: CPT | Performed by: PHYSICIAN ASSISTANT

## 2023-01-18 ASSESSMENT — PAIN SCALES - GENERAL: PAINLEVEL: MODERATE PAIN (4)

## 2023-01-18 NOTE — LETTER
2023         RE: Radha Ruiz  [address] [address] [address]        Dear Colleague,    Thank you for referring your patient, Radha Ruiz, to the [facility]. Please see a copy of my visit note below.    Chief Complaint   Patient presents with     Surgical Followup     S/P septoplasty, TR, and excision left kiersten bullosa 22       History of Present Illness - Radha Ruiz is a 47 year old female who is status post septoplasty on 22.  The patient tells me that other than the sense of congestion, they have had no problems.    No headaches,  chills, or change in vision. Reports intermittent low grade temps.   She has been doing well. Reports her nose has improved. She has been able to breath better since her surgery.        Operative- 22  Post-op Diagnosis:  Same  Procedures:    1.  Endoscopic septoplasty  2.  Bilateral submucosal reduction inferior turbinates with partial excision of the right inferior turbinate  3.  Excision left kiersten bullosa     Surgeon:  Laurie Stoddard D.O.  Anesthesia:  General endotracheal  EBL:  5 ml  Findings: preop spur left floor, mid to superior deviation right  Complications:  none  Condition:  stable        Past Medical History:   Diagnosis Date     Encounter for gynecological examination without abnormal finding     08,Satisfactory GYN examination     Other pulmonary embolism without acute cor pulmonale (H)     History of pulmonary emboli after      Personal history of other medical treatment (CODE)      2, para 1-0-1-1     Personal history of other medical treatment (CODE)     10/00,History of blood transfusion with         Allergies   Allergen Reactions     Sulfa Drugs Hives     Current Outpatient Medications   Medication     beclomethasone (QNASL) 80 MCG/ACT nasal aerosol     budesonide (PULMICORT) 0.5 MG/2ML neb solution     cetirizine-pseudoePHEDrine ER (ZYRTEC-D  "ALLERGY & CONGESTION) 5-120 MG 12 hr tablet     fluticasone (FLONASE) 50 MCG/ACT nasal spray     linaclotide (LINZESS) 72 MCG capsule     predniSONE (DELTASONE) 20 MG tablet     traZODone (DESYREL) 100 MG tablet     venlafaxine (EFFEXOR XR) 150 MG 24 hr capsule     venlafaxine (EFFEXOR XR) 75 MG 24 hr capsule     Vitamin D, Cholecalciferol, 25 MCG (1000 UT) CAPS     No current facility-administered medications for this visit.     ROS- SEE HPI  /74 (Cuff Size: Adult Regular)   Pulse 80   Temp 97.3  F (36.3  C) (Tympanic)   Ht 1.537 m (5' 0.51\")   Wt 72.1 kg (159 lb)   LMP  (LMP Unknown)   SpO2 99%   BMI 30.53 kg/m      General - The patient is well nourished and well developed, and appears to have good nutritional status.  Alert and oriented to person and place, answers questions and cooperates with examination appropriately.   Head and Face - Normocephalic and atraumatic, with no gross asymmetry noted of the contour of the facial features.  The facial nerve is intact, with strong symmetric movements.  Eyes - Extraocular movements intact, and the pupils were reactive to light.  Sclera were not icteric or injected, conjunctiva were pink and moist.  Mouth - Examination of the oral cavity shows pink, healthy, moist mucosa.  No lesions or ulceration noted.  The dentition are in good repair.  The tongue is mobile and midline.     To evaluate the nose and sinuses in the post operative state, I performed rigid nasal endoscopy. The nose was anesthetized with home afrin or topical lidocaine and neosynephrine in the office.     I began with the LEFT side using a 0 degree rigid nasal endoscope, and then similarly examined the RIGHT side     Findings:  Septum: Midline, healing well.   Inferior turbinates:  Reduced, suctioned. No adhesions noted.   Middle turbinate and middle meatus:  No purulence, no polyposis, no synechiae.  Middle turbinate left [place] used to lateralize  Mucosa is  healthy throughout without " polyps nor polypoid degeneration          ASSESSMENT/ PLAN:    ICD-10-CM    1. S/P nasal septoplasty  Z98.890       2. S/P nasal turbinate reduction   Z98.890         Radha is doing very well since her surgery and happy with her results thus far.  She is recommended to continue with rinses 1-2 times daily.   May use Flonase.       Consider allergy testing in future.        Jena Breen PA-C  ENT  [facility], [place]          Again, thank you for allowing me to participate in the care of your patient.        Sincerely,        Jena Breen PA-C

## 2023-01-18 NOTE — PATIENT INSTRUCTIONS
Continue with Rinses  May return to Flonase.   Consider allergy testing. Contact nurse- if you wish to proceed to schedule.        Thank you for allowing Jena Breen PA-C and our ENT team to participate in your care.  If your medications are too expensive, please give the nurse a call.  We can possibly change this medication.  If you have a scheduling or an appointment question please contact our Health Unit Coordinator at [phone], Ext. [phone].    ALL nursing questions or concerns can be directed to your ENT nurse at: [phone] [place]

## 2023-04-01 ENCOUNTER — HEALTH MAINTENANCE LETTER (OUTPATIENT)
Age: 48
End: 2023-04-01

## 2023-04-04 DIAGNOSIS — J30.1 CHRONIC ALLERGIC RHINITIS DUE TO POLLEN: ICD-10-CM

## 2023-04-05 RX ORDER — CETIRIZINE HYDROCHLORIDE, PSEUDOEPHEDRINE HYDROCHLORIDE 5; 120 MG/1; MG/1
TABLET, FILM COATED, EXTENDED RELEASE ORAL
Qty: 72 TABLET | Refills: 5 | Status: SHIPPED | OUTPATIENT
Start: 2023-04-05 | End: 2023-10-04

## 2023-04-05 NOTE — TELEPHONE ENCOUNTER
[facility] sent Rx request for the following:      Requested Prescriptions   Pending Prescriptions Disp Refills     cetirizine-pseudoePHEDrine ER (ZYRTEC-D ALLERGY & CONGESTION) 5-120 MG 12 hr tablet [Pharmacy Med Name: ZYRTEC-D 12 HOUR TABLETS OTC] 72 tablet      Sig: TAKE 1 TABLET BY MOUTH TWICE DAILY   Last Prescription Date:   10/24/22  Last Fill Qty/Refills:         72, R-5    Last Office Visit:              12/13/22   Future Office visit:           None    Jayla Noland RN .............. 4/5/2023  11:20 AM

## 2023-04-13 DIAGNOSIS — J34.829 NASAL VALVE COLLAPSE: ICD-10-CM

## 2023-04-13 DIAGNOSIS — J34.3 NASAL TURBINATE HYPERTROPHY: ICD-10-CM

## 2023-04-13 DIAGNOSIS — J34.2 DNS (DEVIATED NASAL SEPTUM): ICD-10-CM

## 2023-04-13 RX ORDER — BUDESONIDE 0.5 MG/2ML
INHALANT ORAL
Qty: 120 ML | Refills: 11 | Status: ON HOLD | OUTPATIENT
Start: 2023-04-13 | End: 2023-10-26

## 2023-04-13 NOTE — TELEPHONE ENCOUNTER
budesonide (PULMICORT) 0.5 MG/2ML neb solution      Last Written Prescription Date:  12-20-22  Last Fill Quantity: 120ml,   # refills: 11  Last Office Visit: 1-18-23  Future Office visit:       Routing refill request to provider for review/approval because:  PHARMACY CHANGE

## 2023-06-22 ENCOUNTER — PATIENT OUTREACH (OUTPATIENT)
Dept: GASTROENTEROLOGY | Facility: CLINIC | Age: 48
End: 2023-06-22
Payer: COMMERCIAL

## 2023-06-22 DIAGNOSIS — Z12.11 SPECIAL SCREENING FOR MALIGNANT NEOPLASMS, COLON: Primary | ICD-10-CM

## 2023-06-22 NOTE — TELEPHONE ENCOUNTER
"Ordering/Referring Provider: Rashad Adhikari  BMI: Estimated body mass index is 30.53 kg/m  as calculated from the following:    Height as of 1/18/23: 1.537 m (5' 0.51\").    Weight as of 1/18/23: 72.1 kg (159 lb).     Sedation:  Based on patient's medical history patient is appropriate for   MAC/deep sedation.   BMI<= 45 45 < BMI <= 48 48 < BMI < = 50  BMI > 50   No Restrictions No [facility]  No [facility]  [facility] with exceptions Hospital Only OR Only       Location:  Does patient have an LVAD?  No    Does patient have a history of moderate to severe sleep apnea?  No    Does patient have a history of asthma, COPD or any other lung disease?  No    Does patient have a history of cardiac disease?  No    Is patient awaiting a heart or lung transplant?   No    Has patient had a stroke or transient ischemic attack in the last 6 months?   No    Is the patient currently on dialysis?   No    Prep:  Previous prep (last colonoscopy):   N/A - unknown     Quality of previous prep:   N/A - unknown     Is patient currently on dialysis, is ESRD, or CKD stage 4/5?   No (standard prep)    Does patient have a diagnosis of diabetes?  No    Does patient have a diagnosis of cystic fibrosis (CF)?  No    BMI > 40?  No    Final Referral Status:  meets the criteria for placement of colonoscopy screening  referral order.      Referral order placed with the following recommendations:  Sedation: MAC/Deep Sedation  Location Type: No Scheduling Restrictions  Prep: MiraLAX (No Mag Citrate)     Laura Thompson RN on 6/22/2023 at 1:39 PM          "

## 2023-07-19 NOTE — PROGRESS NOTES
Chief Complaint   Patient presents with     RECHECK     Sinus check, septoplasty, TR, excision left kiersten bullosa 22       History of Present Illness - Radha Ruiz is a 47 year old female who is status post septoplasty on 22.  The patient tells me that other than the sense of congestion, they have had no problems.    No headaches,  chills, or change in vision. Reports intermittent low grade temps.        Patient reports that her symptoms have returned and that her nose has not fully resolved. She feels like they deteriorated. She has noticed increase in allergies since her last visit.   She has been using Budesonide rinses BID, Flonase.   Using breath right strips in order to breath.     She has felt her nose feels restricted and collapses routinely.     Allergies have been seasonal, reports fairly controlled.        Operative- 22  Post-op Diagnosis:  Same  Procedures:    1.  Endoscopic septoplasty  2.  Bilateral submucosal reduction inferior turbinates with partial excision of the right inferior turbinate  3.  Excision left kiersten bullosa     Surgeon:  Laurie Stoddard D.O.  Anesthesia:  General endotracheal  EBL:  5 ml  Findings: preop spur left floor, mid to superior deviation right  Complications:  none  Condition:  stable       Past Medical History:   Diagnosis Date     Encounter for gynecological examination without abnormal finding     08,Satisfactory GYN examination     Other pulmonary embolism without acute cor pulmonale (H)     History of pulmonary emboli after      Personal history of other medical treatment (CODE)      2, para 1-0-1-1     Personal history of other medical treatment (CODE)     10/00,History of blood transfusion with         Allergies   Allergen Reactions     Sulfa Antibiotics Hives     Current Outpatient Medications   Medication     beclomethasone (QNASL) 80 MCG/ACT nasal aerosol     budesonide (PULMICORT) 0.5 MG/2ML neb  "solution     cetirizine-pseudoePHEDrine ER (ZYRTEC-D ALLERGY & CONGESTION) 5-120 MG 12 hr tablet     fluticasone (FLONASE) 50 MCG/ACT nasal spray     linaclotide (LINZESS) 72 MCG capsule     predniSONE (DELTASONE) 20 MG tablet     traZODone (DESYREL) 100 MG tablet     venlafaxine (EFFEXOR XR) 150 MG 24 hr capsule     venlafaxine (EFFEXOR XR) 75 MG 24 hr capsule     Vitamin D, Cholecalciferol, 25 MCG (1000 UT) CAPS     No current facility-administered medications for this visit.     ROS- SEE HPI  /70 (BP Location: Right arm, Cuff Size: Adult Regular)   Pulse 97   Temp 97.1  F (36.2  C) (Tympanic)   Ht 1.575 m (5' 2\")   Wt 72.6 kg (160 lb)   LMP  (LMP Unknown)   SpO2 100%   BMI 29.26 kg/m      General - The patient is well nourished and well developed, and appears to have good nutritional status.  Alert and oriented to person and place, answers questions and cooperates with examination appropriately.   Head and Face - Normocephalic and atraumatic, with no gross asymmetry noted of the contour of the facial features.  The facial nerve is intact, with strong symmetric movements.  Eyes - Extraocular movements intact, and the pupils were reactive to light.  Sclera were not icteric or injected, conjunctiva were pink and moist.  Mouth - Examination of the oral cavity shows pink, healthy, moist mucosa.  No lesions or ulceration noted.  The dentition are in good repair.  The tongue is mobile and midline.     To evaluate the nose and sinuses in the post operative state, I performed rigid nasal endoscopy. The nose was anesthetized with home afrin or topical lidocaine and neosynephrine in the office.     I began with the LEFT side using a 0 degree rigid nasal endoscope, and then similarly examined the RIGHT side     Findings:  Septum: Midline. No nasal obstruction.   Inferior turbinates:  Reduced, suctioned. No adhesions noted.   Middle turbinate and middle meatus:  No purulence, no polyposis, no synechiae.    Mucosa " is  healthy throughout without polyps nor polypoid degeneration   Bilateral nasal valve collapse. Improvement with nick procedure.       ASSESSMENT/ PLAN:    ICD-10-CM    1. Nasal valve collapse  M95.0       2. Nasal congestion  R09.81       3. S/P nasal septoplasty  Z98.890       4. Perennial allergic rhinitis with seasonal variation  J30.89     J30.2           Discussed options with patient.  Today she has ongoing issues with nasal obstruction and notable nasal valve collapse.  Patient wishes to proceed with surgical management.  Risk complications occasions reviewed and surgery scheduled. Bilateral nasal valve repair with possible turbinate reduction w/ Dr Stoddard.   +latera     She will complete allergy testing in the winter months.      I discussed the risks and complications of nasal valve repair, including but no limited to anesthesia, bleeding, infection, change in the appearance of the nose, foreign body sensation, implant or cartilage extrusion, septal perforation, anosmia, atrophic rhinitis, scar formation, numbness over the incision, need for additional surgery.  Understanding is expressed, all questions are answered and wishes are to proceed with surgical intervention  The risks of inferior turbinate reduction surgery were discussed, including but not limited to local anesthesia, bleeding, infection, synechiae, injury to the nose, possible repeat procedure or further surgery    Indications for allergy testing include:   1) Confirm suspicion of allergic rhinitis due to inhalant allergies  2) Identify the offending allergen to determine specific mode of treatment  3) In the case of chronic rhinosinusitis: when symptoms are not controlled by avoidance and pharmacotherapy  4) In the Asthma patient when exacerbations may be due to perennial allergen exposure  5) Suspect food allergy  6) Otitis Media, chronic rhinitis, atopic dermatitis, Meniere disease, headache, pharyngitis or eye symptoms    Due to  allergies, modified quantitative testing (MQT) will be performed.  Signed consent was obtained, and the risks of immunotherapy were discussed, including the potential for anaphylaxis.    If immunotherapy (IT) is recommended, there is continued risk of anaphylaxis.   Anaphylaxis can cause death. The patient will need to be monitored for 30 minutes post injection.  They must present their epinephrine pen prior to injection.  Subcutaneous as well as sublingual immunotherapy (SLIT) were discussed as potential treatment options.  The patient was told SLIT is not approved by the FDA and is cash pay.  The general time frame of immunotherapy was discussed (generally 3-5 years, sometimes longer), and the basic immunology behind IT was discussed.    Jena Breen PA-C  ENT  [facility], [place]

## 2023-07-20 ENCOUNTER — HOSPITAL ENCOUNTER (OUTPATIENT)
Facility: HOSPITAL | Age: 48
End: 2023-07-20
Attending: OTOLARYNGOLOGY | Admitting: OTOLARYNGOLOGY
Payer: COMMERCIAL

## 2023-07-20 ENCOUNTER — PREP FOR PROCEDURE (OUTPATIENT)
Dept: OTOLARYNGOLOGY | Facility: OTHER | Age: 48
End: 2023-07-20

## 2023-07-20 ENCOUNTER — OFFICE VISIT (OUTPATIENT)
Dept: OTOLARYNGOLOGY | Facility: OTHER | Age: 48
End: 2023-07-20
Attending: PHYSICIAN ASSISTANT
Payer: COMMERCIAL

## 2023-07-20 VITALS
WEIGHT: 160 LBS | BODY MASS INDEX: 29.44 KG/M2 | HEART RATE: 97 BPM | OXYGEN SATURATION: 100 % | HEIGHT: 62 IN | DIASTOLIC BLOOD PRESSURE: 70 MMHG | SYSTOLIC BLOOD PRESSURE: 108 MMHG | TEMPERATURE: 97.1 F

## 2023-07-20 DIAGNOSIS — J34.829 NASAL VALVE COLLAPSE: Primary | ICD-10-CM

## 2023-07-20 DIAGNOSIS — Z98.890 S/P NASAL SEPTOPLASTY: ICD-10-CM

## 2023-07-20 DIAGNOSIS — J30.2 PERENNIAL ALLERGIC RHINITIS WITH SEASONAL VARIATION: ICD-10-CM

## 2023-07-20 DIAGNOSIS — J30.89 PERENNIAL ALLERGIC RHINITIS WITH SEASONAL VARIATION: ICD-10-CM

## 2023-07-20 DIAGNOSIS — R09.81 NASAL CONGESTION: ICD-10-CM

## 2023-07-20 PROCEDURE — 31231 NASAL ENDOSCOPY DX: CPT | Mod: 52 | Performed by: PHYSICIAN ASSISTANT

## 2023-07-20 PROCEDURE — 99214 OFFICE O/P EST MOD 30 MIN: CPT | Mod: 25 | Performed by: PHYSICIAN ASSISTANT

## 2023-07-20 ASSESSMENT — PAIN SCALES - GENERAL: PAINLEVEL: NO PAIN (0)

## 2023-07-20 NOTE — LETTER
2023         RE: Radha Ruiz  [id number] [address] [address]        Dear Colleague,    Thank you for referring your patient, Radha Ruiz, to the [facility]. Please see a copy of my visit note below.    Chief Complaint   Patient presents with     RECHECK     Sinus check, septoplasty, TR, excision left kiersten bullosa 22       History of Present Illness - Radha Ruiz is a 47 year old female who is status post septoplasty on 22.  The patient tells me that other than the sense of congestion, they have had no problems.    No headaches,  chills, or change in vision. Reports intermittent low grade temps.        Patient reports that her symptoms have returned and that her nose has not fully resolved. She feels like they deteriorated. She has noticed increase in allergies since her last visit.   She has been using Budesonide rinses BID, Flonase.   Using breath right strips in order to breath.     She has felt her nose feels restricted and collapses routinely.     Allergies have been seasonal, reports fairly controlled.        Operative- 22  Post-op Diagnosis:  Same  Procedures:    1.  Endoscopic septoplasty  2.  Bilateral submucosal reduction inferior turbinates with partial excision of the right inferior turbinate  3.  Excision left kiersten bullosa     Surgeon:  Laurie Stoddard D.O.  Anesthesia:  General endotracheal  EBL:  5 ml  Findings: preop spur left floor, mid to superior deviation right  Complications:  none  Condition:  stable       Past Medical History:   Diagnosis Date     Encounter for gynecological examination without abnormal finding     08,Satisfactory GYN examination     Other pulmonary embolism without acute cor pulmonale (H)     History of pulmonary emboli after      Personal history of other medical treatment (CODE)      2, para 1-0-1-1     Personal history of other medical treatment (CODE)      "10/00,History of blood transfusion with         Allergies   Allergen Reactions     Sulfa Antibiotics Hives     Current Outpatient Medications   Medication     beclomethasone (QNASL) 80 MCG/ACT nasal aerosol     budesonide (PULMICORT) 0.5 MG/2ML neb solution     cetirizine-pseudoePHEDrine ER (ZYRTEC-D ALLERGY & CONGESTION) 5-120 MG 12 hr tablet     fluticasone (FLONASE) 50 MCG/ACT nasal spray     linaclotide (LINZESS) 72 MCG capsule     predniSONE (DELTASONE) 20 MG tablet     traZODone (DESYREL) 100 MG tablet     venlafaxine (EFFEXOR XR) 150 MG 24 hr capsule     venlafaxine (EFFEXOR XR) 75 MG 24 hr capsule     Vitamin D, Cholecalciferol, 25 MCG (1000 UT) CAPS     No current facility-administered medications for this visit.     ROS- SEE HPI  /70 (BP Location: Right arm, Cuff Size: Adult Regular)   Pulse 97   Temp 97.1  F (36.2  C) (Tympanic)   Ht 1.575 m (5' 2\")   Wt 72.6 kg (160 lb)   LMP  (LMP Unknown)   SpO2 100%   BMI 29.26 kg/m      General - The patient is well nourished and well developed, and appears to have good nutritional status.  Alert and oriented to person and place, answers questions and cooperates with examination appropriately.   Head and Face - Normocephalic and atraumatic, with no gross asymmetry noted of the contour of the facial features.  The facial nerve is intact, with strong symmetric movements.  Eyes - Extraocular movements intact, and the pupils were reactive to light.  Sclera were not icteric or injected, conjunctiva were pink and moist.  Mouth - Examination of the oral cavity shows pink, healthy, moist mucosa.  No lesions or ulceration noted.  The dentition are in good repair.  The tongue is mobile and midline.     To evaluate the nose and sinuses in the post operative state, I performed rigid nasal endoscopy. The nose was anesthetized with home afrin or topical lidocaine and neosynephrine in the office.     I began with the LEFT side using a 0 degree rigid nasal " endoscope, and then similarly examined the RIGHT side     Findings:  Septum: Midline. No nasal obstruction.   Inferior turbinates:  Reduced, suctioned. No adhesions noted.   Middle turbinate and middle meatus:  No purulence, no polyposis, no synechiae.    Mucosa is  healthy throughout without polyps nor polypoid degeneration   Bilateral nasal valve collapse. Improvement with nick procedure.       ASSESSMENT/ PLAN:    ICD-10-CM    1. Nasal valve collapse  M95.0       2. Nasal congestion  R09.81       3. S/P nasal septoplasty  Z98.890       4. Perennial allergic rhinitis with seasonal variation  J30.89     J30.2           Discussed options with patient.  Today she has ongoing issues with nasal obstruction and notable nasal valve collapse.  Patient wishes to proceed with surgical management.  Risk complications occasions reviewed and surgery scheduled. Bilateral nasal valve repair with possible turbinate reduction w/ Dr Stoddard.   +latera     She will complete allergy testing in the winter months.      I discussed the risks and complications of nasal valve repair, including but no limited to anesthesia, bleeding, infection, change in the appearance of the nose, foreign body sensation, implant or cartilage extrusion, septal perforation, anosmia, atrophic rhinitis, scar formation, numbness over the incision, need for additional surgery.  Understanding is expressed, all questions are answered and wishes are to proceed with surgical intervention  The risks of inferior turbinate reduction surgery were discussed, including but not limited to local anesthesia, bleeding, infection, synechiae, injury to the nose, possible repeat procedure or further surgery    Indications for allergy testing include:   1) Confirm suspicion of allergic rhinitis due to inhalant allergies  2) Identify the offending allergen to determine specific mode of treatment  3) In the case of chronic rhinosinusitis: when symptoms are not controlled by  avoidance and pharmacotherapy  4) In the Asthma patient when exacerbations may be due to perennial allergen exposure  5) Suspect food allergy  6) Otitis Media, chronic rhinitis, atopic dermatitis, Meniere disease, headache, pharyngitis or eye symptoms    Due to allergies, modified quantitative testing (MQT) will be performed.  Signed consent was obtained, and the risks of immunotherapy were discussed, including the potential for anaphylaxis.    If immunotherapy (IT) is recommended, there is continued risk of anaphylaxis.   Anaphylaxis can cause death. The patient will need to be monitored for 30 minutes post injection.  They must present their epinephrine pen prior to injection.  Subcutaneous as well as sublingual immunotherapy (SLIT) were discussed as potential treatment options.  The patient was told SLIT is not approved by the FDA and is cash pay.  The general time frame of immunotherapy was discussed (generally 3-5 years, sometimes longer), and the basic immunology behind IT was discussed.    Jean Breen PA-C  ENT  [facility], [place]          Again, thank you for allowing me to participate in the care of your patient.        Sincerely,        Jena Breen PA-C

## 2023-07-20 NOTE — PATIENT INSTRUCTIONS
Complete allergy testing in Winter.   Continue with rinses and Flonase.     Proceed with Nasal valve repair.  Form completed to obtain coverage for procedure. Once completed, we will contact you to schedule surgery        Thank you for allowing Jena Breen PA-C and our ENT team to participate in your care.  If your medications are too expensive, please give the nurse a call.  We can possibly change this medication.  If you have a scheduling or an appointment question please contact our Health Unit Coordinator at [phone], Ext. [phone].    ALL nursing questions or concerns can be directed to your ENT nurse at: [phone] [facility]      Instructions for Sinus Surgery    Recovery - Everyone recovers differently from a general anesthetic.  Symptoms such as fatigue, nausea, light-headedness, and sometimes a low grade fever (up to 100 degrees) are not unusual.  As your body removes the anesthetic drugs from circulation, these symptoms will resolve.  Your nose will be sore after surgery, and you may even have symptoms similar to a sinus infection with headache, congestion, and pressure.  These will resolve with healing.  For several days you may experience bloody drainage from the nose, please use the drip pad as necessary for this.  If there is persistent bleeding, please call the office during business hours or the on call ENT physician after hours.  There are no diet restrictions after sinus surgery, and you can resume your home medications.      Please do not blow your nose until 2 weeks after surgery.       Limit your activity to no strenuous activities until I see you for the first follow-up visit in approximately 2 weeks.      Medications - You were sent home with narcotic pain medication.  If you can tolerate the discomfort during your recovery by using just plain Tylenol or ibuprofen (advil), please do so.  However, do not hesitate to use the stronger pain medication if needed.  If you were sent home with an  antibiotic, it is primarily used to help the healing process.  If it causes loose bowel movements or other signs of intolerance, it is appropriate to discontinue it.  By far the most important measure you can take to speed recovery, and maximize the chances of long term success of sinus surgery is using the sinus rinses at least three time per day for the first month after surgery.       Start budesonide irrigations tomorrow.  Use 2 times daily for one month and then as directed.  Start [place] Med saline irrigation tonight and use at least 3 times daily.   Continue twice daily budesonide irrigations and 2-3 times daily NeilMed saline irrigations for 1 month and then as directed by Dr. Stoddard    Budesonide instructions  Make the saline solution using 2 packages of salt and previously boiled or distilled water.  This will make 240 ml of saline solution.  Mix the entire vial of budesonide into the solution.   Irrigate your nose 2 times a day with the warm budesonide solution using 1 bottle between nostrils in the morning, and one bottle at night.   Use plain ansley med saline without the steroid 2-3 times during the afternoon    Perform gentle irrigation for the first week.  Starting 1 week after surgery, you can start to irrigate a bit more forcefully.  The more often you irrigate with the ansley med saline, the faster you will heal.      At 3 weeks after surgery you may restart nasal steroids if needed (flonase, nasonex, etc).      Complications - Problems related to sinus surgery almost always are detected during the operation, and special instruction will be given in that situation.  However, unexpected things can happen, and are all related to the structures around the sinus cavities.  Symptoms that should alert you to a possible problem include: severe eye pain or eye swelling, persistent heavy bleeding from the nose, and high fevers with headache and neck pain.  Any of these symptoms should be called into my  office or to the on call ENT if after hours.  The most common non-emergency complication of sinus surgery is the formation of scar tissue which can re-block the sinuses.  This is addressed below.    Follow-up -  As you have noted, there are quite a few follow-up visits after sinus surgery.  This is done to aggressively manage the most common complication of this technique, which is scar tissue blocking the sinuses.  These visits will require the examination of your nose and possibly removal of crusts of dry mucous and blood, with possible removal of early scar tissue.  Please prepare for these visits by using your sinus rinses.    If there are any questions or issues with the above, or if there are other issues that concern you, always feel free to call the clinic and I am happy to speak with you as soon as I can.    Laurie Stoddard D.O.  Otolaryngology/Head and Neck Surgery  Allergy    [phone]   extension [phone]

## 2023-08-25 ENCOUNTER — VIRTUAL VISIT (OUTPATIENT)
Dept: OTOLARYNGOLOGY | Facility: OTHER | Age: 48
End: 2023-08-25
Attending: OTOLARYNGOLOGY
Payer: COMMERCIAL

## 2023-08-25 ENCOUNTER — PREP FOR PROCEDURE (OUTPATIENT)
Dept: OTOLARYNGOLOGY | Facility: OTHER | Age: 48
End: 2023-08-25

## 2023-08-25 DIAGNOSIS — Z98.890 HISTORY OF NASAL SURGERY: ICD-10-CM

## 2023-08-25 DIAGNOSIS — J34.829 NASAL VALVE COLLAPSE: Primary | ICD-10-CM

## 2023-08-25 PROCEDURE — 99213 OFFICE O/P EST LOW 20 MIN: CPT | Mod: 93 | Performed by: OTOLARYNGOLOGY

## 2023-08-25 ASSESSMENT — PAIN SCALES - GENERAL: PAINLEVEL: MILD PAIN (2)

## 2023-08-25 NOTE — PATIENT INSTRUCTIONS
We are referring you to [facility], Dr. Slade for nasal valve collapse.   At this time, Dr. Stoddard would like you to stop all irrigations and nasal sprays.  Follow up with us as needed.    Thank you for allowing Dr. Stoddard and our ENT team to participate in your care.  If your medications are too expensive, please give the nurse a call.  We can possibly change this medication.  If you have a scheduling or an appointment question please contact our Health Unit Coordinator at their direct line [phone].   ALL nursing questions or concerns can be directed to your ENT nurse, [place], at: [phone]

## 2023-08-25 NOTE — PROGRESS NOTES
"Radha is a 48 year old who is being evaluated via a billable telephone visit.      What phone number would you like to be contacted at? [phone]    How would you like to obtain your AVS? Yadielhart    Distant Location (provider location):  On-site    Assessment & Plan   Problem List Items Addressed This Visit    None            BMI:   Estimated body mass index is 29.26 kg/m  as calculated from the following:    Height as of 7/20/23: 1.575 m (5' 2\").    Weight as of 7/20/23: 72.6 kg (160 lb).               Impression/Plan    ICD-10-CM    1. Nasal valve collapse  M95.0       2. History of nasal surgery  Z98.890           We are referring you to [facility], Dr. Slade for nasal valve collapse.   At this time, Dr. Stoddard would like you to stop all irrigations and nasal sprays.  Follow up with us as needed.    Laurie Stoddard MD  [facility]   Radha is a 48 year old, presenting for the following health issues:  Nasal Problem (Discuss Nasal valve surgery. Has questions regarding the PA. )    HPI     History of septoplasty turbinate reduction excision right kiersten bullosa on 1/20/2022 with good results postoperatively and normal postop visit in 1/3/2023.    She saw Jena on 7/20/2023  For persistent congestion.  Endoscopy was normal other than bilateral nasal valve collapse improved with modified Xochitl maneuver.  She also has upcoming allergy testing    Jena discussed nasal valve repair possible turbinate reduction.      No recent imaging    Using budesonide irrigations and the navage    Current symptoms :  pain in upper nose with facial pressure,  congestion requiring breathe right strips, bilateral ear pressure and otalgia  All worse with irrigations      Has used flonase and qnasal but notes this leads to a drier nose    No sudden hearing loss, no vertigo      She has a numbing constant dull pain cheeks        Review of Systems   Constitutional, HEENT, cardiovascular, " pulmonary, gi and gu systems are negative, except as otherwise noted.      Objective    Vitals - Patient Reported  Pain Score: Mild Pain (2)        Physical Exam   healthy, alert, and no distress  PSYCH: Alert and oriented times 3; coherent speech, normal, anxious and tearful  rate and volume, able to articulate logical thoughts, able   to abstract reason, no tangential thoughts, no hallucinations   or delusions  Her affect is anxious  RESP: No cough, no audible wheezing, able to talk in full sentences  Remainder of exam unable to be completed due to telephone visits      Nasal valve pics reviewed, but looks like forceful inhalation with right collapse    Phone call duration: 17:00 minutes

## 2023-08-25 NOTE — LETTER
"    8/25/2023         RE: Radha Ruiz  [id number] [address] [address]        Dear Colleague,    Thank you for referring your patient, Radha Ruiz, to the [facility]. Please see a copy of my visit note below.    Radha is a 48 year old who is being evaluated via a billable telephone visit.      What phone number would you like to be contacted at? [phone]    How would you like to obtain your AVS? MyChart    Distant Location (provider location):  On-site    Assessment & Plan  Problem List Items Addressed This Visit    None            BMI:   Estimated body mass index is 29.26 kg/m  as calculated from the following:    Height as of 7/20/23: 1.575 m (5' 2\").    Weight as of 7/20/23: 72.6 kg (160 lb).               Impression/Plan    ICD-10-CM    1. Nasal valve collapse  M95.0       2. History of nasal surgery  Z98.890           We are referring you to [facility], Dr. Slade for nasal valve collapse.   At this time, Dr. Stoddard would like you to stop all irrigations and nasal sprays.  Follow up with us as needed.    Laurie Stoddard MD  [facility]    Subjective  Radha is a 48 year old, presenting for the following health issues:  Nasal Problem (Discuss Nasal valve surgery. Has questions regarding the PA. )    HPI     History of septoplasty turbinate reduction excision right kiersten bullosa on 1/20/2022 with good results postoperatively and normal postop visit in 1/3/2023.    She saw Jena on 7/20/2023  For persistent congestion.  Endoscopy was normal other than bilateral nasal valve collapse improved with modified Xochitl maneuver.  She also has upcoming allergy testing    Jena discussed nasal valve repair possible turbinate reduction.      No recent imaging    Using budesonide irrigations and the navage    Current symptoms :  pain in upper nose with facial pressure,  congestion requiring breathe right strips, bilateral ear pressure and " otalgia  All worse with irrigations      Has used flonase and qnasal but notes this leads to a drier nose    No sudden hearing loss, no vertigo      She has a numbing constant dull pain cheeks        Review of Systems   Constitutional, HEENT, cardiovascular, pulmonary, gi and gu systems are negative, except as otherwise noted.      Objective   Vitals - Patient Reported  Pain Score: Mild Pain (2)        Physical Exam   healthy, alert, and no distress  PSYCH: Alert and oriented times 3; coherent speech, normal, anxious and tearful  rate and volume, able to articulate logical thoughts, able   to abstract reason, no tangential thoughts, no hallucinations   or delusions  Her affect is anxious  RESP: No cough, no audible wheezing, able to talk in full sentences  Remainder of exam unable to be completed due to telephone visits      Nasal valve pics reviewed, but looks like forceful inhalation with right collapse    Phone call duration: 17:00 minutes         Again, thank you for allowing me to participate in the care of your patient.        Sincerely,        Laurie Stoddard MD

## 2023-08-31 DIAGNOSIS — K63.5 POLYP OF ASCENDING COLON, UNSPECIFIED TYPE: Primary | ICD-10-CM

## 2023-08-31 NOTE — TELEPHONE ENCOUNTER
Screening Questions for the Scheduling of Screening Colonoscopies   (If Colonoscopy is diagnostic, Provider should review the chart before scheduling.)  Are you younger than 50 or older than 80?  NO  Do you take aspirin or fish oil?  FISH OIL (if yes, tell patient to stop 1 week prior to Colonoscopy)  Do you take warfarin (Coumadin), clopidogrel (Plavix), apixaban (Eliquis), dabigatram (Pradaxa), rivaroxaban (Xarelto) or any blood thinner? NO  Do you use oxygen at home?  NO  Do you have kidney disease? NO  Are you on dialysis? NO  Have you had a stroke or heart attack in the last year? NO  Have you had a stent in your heart or any blood vessel in the last year? NO  Have you had a transplant of any organ? NO  Have you had a colonoscopy or upper endoscopy (EGD) before? YES         When?  2020  Date of scheduled Colonoscopy. 10/26/2023  Provider BOBO ROBLEDO  Patient has had colectomy in 2020 and she is unsure how this will effect her prep.

## 2023-09-01 NOTE — TELEPHONE ENCOUNTER
Can you please address the colectomy and prep for this patient.  Keren Rosas LPN..........9/1/2023  9:59 AM

## 2023-09-05 RX ORDER — POLYETHYLENE GLYCOL 3350, SODIUM CHLORIDE, SODIUM BICARBONATE, POTASSIUM CHLORIDE 420; 11.2; 5.72; 1.48 G/4L; G/4L; G/4L; G/4L
4000 POWDER, FOR SOLUTION ORAL ONCE
Qty: 4000 ML | Refills: 0 | Status: SHIPPED | OUTPATIENT
Start: 2023-09-05 | End: 2023-09-05

## 2023-09-05 RX ORDER — BISACODYL 5 MG
TABLET, DELAYED RELEASE (ENTERIC COATED) ORAL
Qty: 2 TABLET | Refills: 0 | Status: ON HOLD | OUTPATIENT
Start: 2023-09-05 | End: 2023-10-26

## 2023-10-04 ENCOUNTER — OFFICE VISIT (OUTPATIENT)
Dept: FAMILY MEDICINE | Facility: OTHER | Age: 48
End: 2023-10-04
Attending: FAMILY MEDICINE
Payer: COMMERCIAL

## 2023-10-04 VITALS
HEIGHT: 60 IN | SYSTOLIC BLOOD PRESSURE: 104 MMHG | BODY MASS INDEX: 33.41 KG/M2 | OXYGEN SATURATION: 99 % | DIASTOLIC BLOOD PRESSURE: 76 MMHG | TEMPERATURE: 97.4 F | WEIGHT: 170.2 LBS | RESPIRATION RATE: 18 BRPM | HEART RATE: 94 BPM

## 2023-10-04 DIAGNOSIS — J30.1 SEASONAL ALLERGIC RHINITIS DUE TO POLLEN: ICD-10-CM

## 2023-10-04 DIAGNOSIS — Z13.228 SCREENING FOR METABOLIC DISORDER: ICD-10-CM

## 2023-10-04 DIAGNOSIS — Z13.220 SCREENING FOR HYPERLIPIDEMIA: ICD-10-CM

## 2023-10-04 DIAGNOSIS — K58.9 IRRITABLE BOWEL SYNDROME, UNSPECIFIED TYPE: ICD-10-CM

## 2023-10-04 DIAGNOSIS — Z80.0 FH: COLON CANCER: ICD-10-CM

## 2023-10-04 DIAGNOSIS — Z12.31 VISIT FOR SCREENING MAMMOGRAM: ICD-10-CM

## 2023-10-04 DIAGNOSIS — Z13.0 SCREENING FOR DEFICIENCY ANEMIA: ICD-10-CM

## 2023-10-04 DIAGNOSIS — F33.41 RECURRENT MAJOR DEPRESSION IN PARTIAL REMISSION (H): ICD-10-CM

## 2023-10-04 DIAGNOSIS — J30.1 CHRONIC ALLERGIC RHINITIS DUE TO POLLEN: ICD-10-CM

## 2023-10-04 DIAGNOSIS — M79.2 NERVE PAIN: ICD-10-CM

## 2023-10-04 DIAGNOSIS — Z13.29 SCREENING FOR HYPOTHYROIDISM: ICD-10-CM

## 2023-10-04 DIAGNOSIS — Z86.0101 H/O ADENOMATOUS POLYP OF COLON: ICD-10-CM

## 2023-10-04 DIAGNOSIS — Z13.1 SCREENING FOR DIABETES MELLITUS: ICD-10-CM

## 2023-10-04 DIAGNOSIS — Z00.00 VISIT FOR PREVENTIVE HEALTH EXAMINATION: Primary | ICD-10-CM

## 2023-10-04 DIAGNOSIS — F41.1 ANXIETY STATE: ICD-10-CM

## 2023-10-04 LAB
ALBUMIN SERPL BCG-MCNC: 4.9 G/DL (ref 3.5–5.2)
ALP SERPL-CCNC: 84 U/L (ref 35–104)
ALT SERPL W P-5'-P-CCNC: 28 U/L (ref 0–50)
ANION GAP SERPL CALCULATED.3IONS-SCNC: 8 MMOL/L (ref 7–15)
AST SERPL W P-5'-P-CCNC: 30 U/L (ref 0–45)
BASO+EOS+MONOS # BLD AUTO: ABNORMAL 10*3/UL
BASO+EOS+MONOS NFR BLD AUTO: ABNORMAL %
BASOPHILS # BLD AUTO: 0.1 10E3/UL (ref 0–0.2)
BASOPHILS NFR BLD AUTO: 1 %
BILIRUB SERPL-MCNC: 0.2 MG/DL
BUN SERPL-MCNC: 3.9 MG/DL (ref 6–20)
CALCIUM SERPL-MCNC: 9.9 MG/DL (ref 8.6–10)
CHLORIDE SERPL-SCNC: 101 MMOL/L (ref 98–107)
CHOLEST SERPL-MCNC: 195 MG/DL
CREAT SERPL-MCNC: 0.73 MG/DL (ref 0.51–0.95)
DEPRECATED HCO3 PLAS-SCNC: 27 MMOL/L (ref 22–29)
EGFRCR SERPLBLD CKD-EPI 2021: >90 ML/MIN/1.73M2
EOSINOPHIL # BLD AUTO: 0.1 10E3/UL (ref 0–0.7)
EOSINOPHIL NFR BLD AUTO: 1 %
ERYTHROCYTE [DISTWIDTH] IN BLOOD BY AUTOMATED COUNT: 11.8 % (ref 10–15)
GLUCOSE SERPL-MCNC: 85 MG/DL (ref 70–99)
HBA1C MFR BLD: 5.4 % (ref 4–6.2)
HCT VFR BLD AUTO: 38.1 % (ref 35–47)
HDLC SERPL-MCNC: 64 MG/DL
HGB BLD-MCNC: 13.4 G/DL (ref 11.7–15.7)
HOLD SPECIMEN: NORMAL
IMM GRANULOCYTES # BLD: 0 10E3/UL
IMM GRANULOCYTES NFR BLD: 0 %
LDLC SERPL CALC-MCNC: 107 MG/DL
LYMPHOCYTES # BLD AUTO: 2.4 10E3/UL (ref 0.8–5.3)
LYMPHOCYTES NFR BLD AUTO: 36 %
MCH RBC QN AUTO: 33.5 PG (ref 26.5–33)
MCHC RBC AUTO-ENTMCNC: 35.2 G/DL (ref 31.5–36.5)
MCV RBC AUTO: 95 FL (ref 78–100)
MONOCYTES # BLD AUTO: 0.5 10E3/UL (ref 0–1.3)
MONOCYTES NFR BLD AUTO: 7 %
NEUTROPHILS # BLD AUTO: 3.7 10E3/UL (ref 1.6–8.3)
NEUTROPHILS NFR BLD AUTO: 55 %
NONHDLC SERPL-MCNC: 131 MG/DL
NRBC # BLD AUTO: 0 10E3/UL
NRBC BLD AUTO-RTO: 0 /100
PLATELET # BLD AUTO: 332 10E3/UL (ref 150–450)
POTASSIUM SERPL-SCNC: 4.3 MMOL/L (ref 3.4–5.3)
PROT SERPL-MCNC: 7.4 G/DL (ref 6.4–8.3)
RBC # BLD AUTO: 4 10E6/UL (ref 3.8–5.2)
SODIUM SERPL-SCNC: 136 MMOL/L (ref 135–145)
TRIGL SERPL-MCNC: 118 MG/DL
TSH SERPL DL<=0.005 MIU/L-ACNC: 1.54 UIU/ML (ref 0.3–4.2)
WBC # BLD AUTO: 6.7 10E3/UL (ref 4–11)

## 2023-10-04 PROCEDURE — 80061 LIPID PANEL: CPT | Mod: ZL | Performed by: FAMILY MEDICINE

## 2023-10-04 PROCEDURE — 36415 COLL VENOUS BLD VENIPUNCTURE: CPT | Mod: ZL | Performed by: FAMILY MEDICINE

## 2023-10-04 PROCEDURE — 99396 PREV VISIT EST AGE 40-64: CPT | Mod: 25 | Performed by: FAMILY MEDICINE

## 2023-10-04 PROCEDURE — 90471 IMMUNIZATION ADMIN: CPT | Performed by: FAMILY MEDICINE

## 2023-10-04 PROCEDURE — 80053 COMPREHEN METABOLIC PANEL: CPT | Mod: ZL | Performed by: FAMILY MEDICINE

## 2023-10-04 PROCEDURE — 84443 ASSAY THYROID STIM HORMONE: CPT | Mod: ZL | Performed by: FAMILY MEDICINE

## 2023-10-04 PROCEDURE — 90682 RIV4 VACC RECOMBINANT DNA IM: CPT | Performed by: FAMILY MEDICINE

## 2023-10-04 PROCEDURE — 83036 HEMOGLOBIN GLYCOSYLATED A1C: CPT | Mod: ZL | Performed by: FAMILY MEDICINE

## 2023-10-04 PROCEDURE — 90480 ADMN SARSCOV2 VAC 1/ONLY CMP: CPT | Performed by: FAMILY MEDICINE

## 2023-10-04 PROCEDURE — 91320 SARSCV2 VAC 30MCG TRS-SUC IM: CPT | Performed by: FAMILY MEDICINE

## 2023-10-04 PROCEDURE — 85025 COMPLETE CBC W/AUTO DIFF WBC: CPT | Mod: ZL | Performed by: FAMILY MEDICINE

## 2023-10-04 RX ORDER — CETIRIZINE HYDROCHLORIDE, PSEUDOEPHEDRINE HYDROCHLORIDE 5; 120 MG/1; MG/1
TABLET, FILM COATED, EXTENDED RELEASE ORAL
Qty: 72 TABLET | Refills: 5 | Status: SHIPPED | OUTPATIENT
Start: 2023-10-04 | End: 2023-12-11

## 2023-10-04 RX ORDER — GABAPENTIN 300 MG/1
300 CAPSULE ORAL 3 TIMES DAILY
Qty: 90 CAPSULE | Refills: 3 | Status: SHIPPED | OUTPATIENT
Start: 2023-10-04 | End: 2024-01-08

## 2023-10-04 RX ORDER — TRAZODONE HYDROCHLORIDE 100 MG/1
TABLET ORAL
Qty: 360 TABLET | Refills: 11 | Status: SHIPPED | OUTPATIENT
Start: 2023-10-04 | End: 2024-08-07

## 2023-10-04 RX ORDER — FLUTICASONE PROPIONATE 50 MCG
SPRAY, SUSPENSION (ML) NASAL
Qty: 48 ML | Refills: 11 | Status: SHIPPED | OUTPATIENT
Start: 2023-10-04 | End: 2024-08-07

## 2023-10-04 ASSESSMENT — ENCOUNTER SYMPTOMS
FREQUENCY: 0
COUGH: 0
NAUSEA: 0
WEAKNESS: 0
HEADACHES: 0
SORE THROAT: 1
ABDOMINAL PAIN: 0
NERVOUS/ANXIOUS: 1
PALPITATIONS: 0
HEMATURIA: 0
DIARRHEA: 0
CHILLS: 0
FEVER: 0
ARTHRALGIAS: 1
MYALGIAS: 0
JOINT SWELLING: 1
HEARTBURN: 0
BREAST MASS: 0
SHORTNESS OF BREATH: 0
EYE PAIN: 0
HEMATOCHEZIA: 0
DYSURIA: 0
CONSTIPATION: 0
PARESTHESIAS: 0
DIZZINESS: 0

## 2023-10-04 ASSESSMENT — PATIENT HEALTH QUESTIONNAIRE - PHQ9
SUM OF ALL RESPONSES TO PHQ QUESTIONS 1-9: 7
SUM OF ALL RESPONSES TO PHQ QUESTIONS 1-9: 7
10. IF YOU CHECKED OFF ANY PROBLEMS, HOW DIFFICULT HAVE THESE PROBLEMS MADE IT FOR YOU TO DO YOUR WORK, TAKE CARE OF THINGS AT HOME, OR GET ALONG WITH OTHER PEOPLE: EXTREMELY DIFFICULT

## 2023-10-04 ASSESSMENT — PAIN SCALES - GENERAL: PAINLEVEL: MODERATE PAIN (5)

## 2023-10-04 NOTE — PATIENT INSTRUCTIONS
Drop the Effexor down to 75 mg daily for the next month.  Start Prozac 20 mg daily.  After a month, we should see you back and we likely will continue to drop the Effexor and possibly at that visit and possibly add some Wellbutrin. We likely will add some Adderall XR at some point.     We will have you use gabapentin for nerve pain. It may also work for anxiety.

## 2023-10-04 NOTE — PROGRESS NOTES
SUBJECTIVE:   CC: Radha is an 48 year old who presents for preventive health visit.       10/4/2023     4:04 PM   Additional Questions   Roomed by Leydi Smart LPN       Healthy Habits:     Getting at least 3 servings of Calcium per day:  Yes    Bi-annual eye exam:  Yes    Dental care twice a year:  Yes    Sleep apnea or symptoms of sleep apnea:  Daytime drowsiness    Diet:  Other    Frequency of exercise:  2-3 days/week    Duration of exercise:  30-45 minutes    Taking medications regularly:  Yes    Medication side effects:  None    Additional concerns today:  Yes      Today's PHQ-9 Score:       10/4/2023     3:21 PM   PHQ-9 SCORE   PHQ-9 Total Score MyChart 7 (Mild depression)   PHQ-9 Total Score 7                       Social History     Tobacco Use    Smoking status: Every Day     Packs/day: 0.25     Years: 30.00     Pack years: 7.50     Types: Cigarettes    Smokeless tobacco: Never   Substance Use Topics    Alcohol use: Yes     Comment: Alcoholic Drinks/day: vodka 4-5 times a week             10/4/2023     3:25 PM   Alcohol Use   Prescreen: >3 drinks/day or >7 drinks/week? No     Reviewed orders with patient.  Reviewed health maintenance and updated orders accordingly - Yes  Labs reviewed in EPIC    Breast Cancer Screening:    FHS-7:       10/4/2023     3:26 PM   Breast CA Risk Assessment (FHS-7)   Did any of your first-degree relatives have breast or ovarian cancer? No   Did any of your relatives have bilateral breast cancer? No   Did any man in your family have breast cancer? No   Did any woman in your family have breast and ovarian cancer? No   Did any woman in your family have breast cancer before age 50 y? No   Do you have 2 or more relatives with breast and/or ovarian cancer? No   Do you have 2 or more relatives with breast and/or bowel cancer? No         Pertinent mammograms are reviewed under the imaging tab.    History of abnormal Pap smear: Status post benign hysterectomy. Health Maintenance and  "Surgical History updated.     Reviewed and updated as needed this visit by clinical staff    Allergies  Meds   Med Hx  Surg Hx  Fam Hx          Reviewed and updated as needed this visit by Provider                     Review of Systems   Constitutional:  Negative for chills and fever.   HENT:  Positive for congestion, ear pain and sore throat. Negative for hearing loss.    Eyes:  Negative for pain and visual disturbance.   Respiratory:  Negative for cough and shortness of breath.    Cardiovascular:  Negative for chest pain, palpitations and peripheral edema.   Gastrointestinal:  Negative for abdominal pain, constipation, diarrhea, heartburn, hematochezia and nausea.   Breasts:  Negative for tenderness, breast mass and discharge.   Genitourinary:  Negative for dysuria, frequency, genital sores, hematuria, pelvic pain, urgency, vaginal bleeding and vaginal discharge.   Musculoskeletal:  Positive for arthralgias and joint swelling. Negative for myalgias.   Skin:  Negative for rash.   Neurological:  Negative for dizziness, weakness, headaches and paresthesias.   Psychiatric/Behavioral:  Positive for mood changes. The patient is nervous/anxious.           OBJECTIVE:   /76   Pulse 94   Temp 97.4  F (36.3  C) (Temporal)   Resp 18   Ht 1.53 m (5' 0.25\")   Wt 77.2 kg (170 lb 3.2 oz)   LMP  (LMP Unknown)   SpO2 99%   Breastfeeding No   BMI 32.96 kg/m    Physical Exam          ASSESSMENT/PLAN:   Radha was seen today for physical.    Diagnoses and all orders for this visit:    Visit for preventive health examination  -     Extra SST Tube (LAB USE ONLY)    Screening for deficiency anemia  -     CBC with Platelets & Differential; Future  -     CBC with Platelets & Differential    Screening for metabolic disorder  -     Comprehensive metabolic panel; Future  -     Comprehensive metabolic panel    Screening for hyperlipidemia  -     Lipid Profile; Future  -     Lipid Profile    Screening for hypothyroidism  -    " " TSH with free T4 reflex; Future  -     TSH with free T4 reflex    FH: colon cancer    H/O adenomatous polyp of colon    Visit for screening mammogram  -     MA Screening Bilateral w/ Tonny; Future    Irritable bowel syndrome, unspecified type  -     linaclotide (LINZESS) 72 MCG capsule; Take 1 capsule (72 mcg) by mouth daily    Screening for diabetes mellitus  -     Hemoglobin A1c; Future  -     Hemoglobin A1c    Seasonal allergic rhinitis due to pollen  -     fluticasone (FLONASE) 50 MCG/ACT nasal spray; INHALE 2 SPRAYS INTO BOTH NOSTRILS ONCE DAILY.    Anxiety state  -     traZODone (DESYREL) 100 MG tablet; TAKE 3-4 TABLETS BY MOUTH AT BEDTIME.  -     FLUoxetine (PROZAC) 20 MG capsule; Take 1 capsule (20 mg) by mouth daily    Recurrent major depression in partial remission (H24)  -     traZODone (DESYREL) 100 MG tablet; TAKE 3-4 TABLETS BY MOUTH AT BEDTIME.    Nerve pain  -     gabapentin (NEURONTIN) 300 MG capsule; Take 1 capsule (300 mg) by mouth 3 times daily    Chronic allergic rhinitis due to pollen  -     cetirizine-pseudoePHEDrine ER (ZYRTEC-D ALLERGY & CONGESTION) 5-120 MG 12 hr tablet; TAKE 1 TABLET BY MOUTH TWICE DAILY    Other orders  -     COVID-19 12+ (2023-24) (PFIZER)  -     INFLUENZA VACCINE 18-64Y (FLUBLOK)              COUNSELING:  Reviewed preventive health counseling, as reflected in patient instructions       Regular exercise       Healthy diet/nutrition       Colorectal Cancer Screening      BMI:   Estimated body mass index is 32.96 kg/m  as calculated from the following:    Height as of this encounter: 1.53 m (5' 0.25\").    Weight as of this encounter: 77.2 kg (170 lb 3.2 oz).   Weight management plan: Discussed healthy diet and exercise guidelines      She reports that she has been smoking cigarettes. She has a 7.50 pack-year smoking history. She has never used smokeless tobacco.  Nicotine/Tobacco Cessation Plan:   Information offered: Patient not interested at this time          Rashad DORSEY" MD [place]  [facility]

## 2023-10-04 NOTE — H&P (VIEW-ONLY)
"Nursing Notes:   Leydi Smart LPN  10/4/2023  4:14 PM  Sign at exiting of workspace  Chief Complaint   Patient presents with    Physical       Initial /76   Pulse 94   Temp 97.4  F (36.3  C) (Temporal)   Resp 18   Ht 1.53 m (5' 0.25\")   Wt 77.2 kg (170 lb 3.2 oz)   LMP  (LMP Unknown)   SpO2 99%   Breastfeeding No   BMI 32.96 kg/m   Estimated body mass index is 32.96 kg/m  as calculated from the following:    Height as of this encounter: 1.53 m (5' 0.25\").    Weight as of this encounter: 77.2 kg (170 lb 3.2 oz).  Medication Reconciliation: complete      Advance care directive on file? no  Advance care directive provided to patient? declined     Leydi Smart LPN    SUBJECTIVE:  Radha Ruiz  is a 48 year old female who comes in today for preventative visit.  I saw her last fall for preop prior to nasal septal surgery with Dr. Stoddard. She was scheduled to have stents in her nose, but that was cancelled. She is scheduled to see a plastic surgeon/ENT who was from the [facility] whose office is in [place].  She has sharp pain that is like nerve pain from her left nose to her upper left tooth. She is cleaning her nose from time to time.  She sees the specialist on December 5.    She continues on Linzess for irritable bowel syndrome which seems to be working reasonably well.    She has more anxiety. She has some attention issues. She is keeping lists and trying to be more organized. She has poor concentration. She doesn't think the Effexor is working as well. She feels fidgety. She is currently on 225 mg daily.       She is due for mammogram.  She is due for follow-up colonoscopy.  Had sessile serrated adenoma 3 years ago and 3-year follow-up was recommended.  She has a family history of colon cancer. It is scheduled.     She is status post hysterectomy and then had an anterior posterior colporrhaphy with enterocele repair in 2017.    She is up-to-date on Boostrix.  She is vaccinated and boosted " for COVID-19 but has not had the new booster.  She has not had her flu shot yet this year.  She just turned 48.  She has not had Shingrix.    Past Medical, Family, and Social History reviewed and updated as noted below.   ROS is negative except as noted above       Allergies   Allergen Reactions    Sulfa Antibiotics Hives   ,   Family History   Problem Relation Age of Onset    Colon Cancer Father 38        Cancer-colon,Colon cancer    Other - See Comments Mother         Psychiatric illness,Untreated anxiety/D&C for postmenopausal bleeding benign    Cancer Maternal Grandfather         Cancer,Brain    Heart Disease Maternal Grandfather         Heart Disease,MI    Hypertension Maternal Grandfather         Hypertension    Other - See Comments Maternal Uncle         Hemochromatosis    Hypertension Maternal Grandmother         Hypertension    Other - See Comments Maternal Grandmother         Alzheimer's    Diabetes Other         Diabetes,Diabetes    Heart Disease Paternal Grandmother         Heart Disease,CHF    Cancer Maternal Uncle         Cancer,cancer all over.    Cancer Paternal Uncle         Cancer,Lung cancer    Breast Cancer No family hx of         Cancer-breast   ,   Current Outpatient Medications   Medication    beclomethasone (QNASL) 80 MCG/ACT nasal aerosol    bisacodyl (DULCOLAX) 5 MG EC tablet    budesonide (PULMICORT) 0.5 MG/2ML neb solution    cetirizine-pseudoePHEDrine ER (ZYRTEC-D ALLERGY & CONGESTION) 5-120 MG 12 hr tablet    FLUoxetine (PROZAC) 20 MG capsule    fluticasone (FLONASE) 50 MCG/ACT nasal spray    gabapentin (NEURONTIN) 300 MG capsule    linaclotide (LINZESS) 72 MCG capsule    traZODone (DESYREL) 100 MG tablet    venlafaxine (EFFEXOR XR) 75 MG 24 hr capsule    Vitamin D, Cholecalciferol, 25 MCG (1000 UT) CAPS     No current facility-administered medications for this visit.   ,   Past Medical History:   Diagnosis Date    Encounter for gynecological examination without abnormal finding      08,Satisfactory GYN examination    Other pulmonary embolism without acute cor pulmonale (H)     History of pulmonary emboli after     Personal history of other medical treatment (CODE)      2, para 1-0-1-1    Personal history of other medical treatment (CODE)     10/00,History of blood transfusion with    ,   Patient Active Problem List    Diagnosis Date Noted    Polyp of ascending colon, unspecified type 10/02/2020     Priority: Medium     Added automatically from request for surgery [id number]      Tobacco abuse 2018     Priority: Medium    Chronic allergic rhinitis due to pollen, unspecified seasonality 2018     Priority: Medium    Anxiety state 2018     Priority: Medium    Constipation 2018     Priority: Medium    Migraine headache 2018     Priority: Medium    Restless leg syndrome 2018     Priority: Medium    Surgical menopause 2015     Priority: Medium    H/O adenomatous polyp of colon 10/06/2014     Priority: Medium    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     ,   Past Surgical History:   Procedure Laterality Date    ant/post colporr w enterocele incl cysturethroscopy  2017    Dr. Mc     SECTION      10/00 /06,History of blood transfusion with     COLONOSCOPY      ,,,F/U 2019    COLONOSCOPY N/A 2020    Procedure: COLONOSCOPY, WITH POLYPECTOMY AND BIOPSY;  Surgeon: Rachel Acevedo MD;  Location: [facility] OR    COLONOSCOPY N/A 10/12/2020    1 sessile serrated, 1 tubular - f/u 3 years    Cryotherapy of Cervix      LAPAROSCOPIC ASSISTED HYSTERECTOMY VAGINAL, BILATERAL SALPINGO-OOPHORECTOMY, COMBINED      [facility]    LAPAROSCOPIC  "TUBAL LIGATION      2006    SEPTOPLASTY, TURBINOPLASTY, COMBINED N/A 12/20/2022    Procedure: Septoplasty, Bilateral turbinate reduction, excision left kiersten bullosa;  Surgeon: Laurie Stoddard MD;  Location: [facility] OR    and   Social History     Tobacco Use    Smoking status: Every Day     Packs/day: 0.25     Years: 30.00     Pack years: 7.50     Types: Cigarettes    Smokeless tobacco: Never   Substance Use Topics    Alcohol use: Yes     Comment: Alcoholic Drinks/day: vodka 4-5 times a week     OBJECTIVE:  /76   Pulse 94   Temp 97.4  F (36.3  C) (Temporal)   Resp 18   Ht 1.53 m (5' 0.25\")   Wt 77.2 kg (170 lb 3.2 oz)   LMP  (LMP Unknown)   SpO2 99%   Breastfeeding No   BMI 32.96 kg/m     EXAM:  General Appearance: Pleasant, alert, appropriate appearance for age. No acute distress  Head Exam: Normal. Normocephalic, atraumatic.  Eye Exam: PERRLA, EOMI, conjunctivae, sclerae normal.  Ear Exam: Normal TM's bilaterally. Normal auditory canals and external ears. Non-tender.  Nose Exam: Normal external nose, mucus membranes, and septum.  OroPharynx Exam:  Dental hygiene adequate. Normal buccal mucosa. Normal pharynx.  Neck Exam:  Supple, no masses or nodes. No bruits  Thyroid Exam: No nodules or enlargement.  Chest/Respiratory Exam: Normal chest wall and respirations. Clear to auscultation.  Breast Exam: No dimpling, nipple retraction or discharge. No masses or nodes.  Cardiovascular Exam: Regular rate and rhythm. S1, S2, no murmur, click, gallop, or rubs.  Gastrointestinal Exam: Soft, non-tender, no masses or organomegaly.  Lymphatic Exam: Non-palpable nodes in neck,clavicular, axillary, or inguinal regions.  Musculoskeletal Exam: Back is straight and non-tender, full ROM of upper and lower extremities.  Foot Exam: Left and right foot: good pedal pulses  Skin: no rash or abnormalities  Neurologic Exam:  normal gross motor, tone coordination and no tremor.  Psychiatric Exam: Alert and oriented - " appropriate affect.    Results for orders placed or performed in visit on 10/04/23   Hemoglobin A1c     Status: Normal   Result Value Ref Range    Hemoglobin A1C 5.4 4.0 - 6.2 %   TSH with free T4 reflex     Status: Normal   Result Value Ref Range    TSH 1.54 0.30 - 4.20 uIU/mL   Lipid Profile     Status: Abnormal   Result Value Ref Range    Cholesterol 195 <200 mg/dL    Triglycerides 118 <150 mg/dL    Direct Measure HDL 64 >=50 mg/dL    LDL Cholesterol Calculated 107 (H) <=100 mg/dL    Non HDL Cholesterol 131 (H) <130 mg/dL    Narrative    Cholesterol  Desirable:  <200 mg/dL    Triglycerides  Normal:  Less than 150 mg/dL  Borderline High:  150-199 mg/dL  High:  200-499 mg/dL  Very High:  Greater than or equal to 500 mg/dL    Direct Measure HDL  Female:  Greater than or equal to 50 mg/dL   Male:  Greater than or equal to 40 mg/dL    LDL Cholesterol  Desirable:  <100mg/dL  Above Desirable:  100-129 mg/dL   Borderline High:  130-159 mg/dL   High:  160-189 mg/dL   Very High:  >= 190 mg/dL    Non HDL Cholesterol  Desirable:  130 mg/dL  Above Desirable:  130-159 mg/dL  Borderline High:  160-189 mg/dL  High:  190-219 mg/dL  Very High:  Greater than or equal to 220 mg/dL   Comprehensive metabolic panel     Status: Abnormal   Result Value Ref Range    Sodium 136 135 - 145 mmol/L    Potassium 4.3 3.4 - 5.3 mmol/L    Carbon Dioxide (CO2) 27 22 - 29 mmol/L    Anion Gap 8 7 - 15 mmol/L    Urea Nitrogen 3.9 (L) 6.0 - 20.0 mg/dL    Creatinine 0.73 0.51 - 0.95 mg/dL    GFR Estimate >90 >60 mL/min/1.73m2    Calcium 9.9 8.6 - 10.0 mg/dL    Chloride 101 98 - 107 mmol/L    Glucose 85 70 - 99 mg/dL    Alkaline Phosphatase 84 35 - 104 U/L    AST 30 0 - 45 U/L    ALT 28 0 - 50 U/L    Protein Total 7.4 6.4 - 8.3 g/dL    Albumin 4.9 3.5 - 5.2 g/dL    Bilirubin Total 0.2 <=1.2 mg/dL   CBC with platelets and differential     Status: Abnormal   Result Value Ref Range    WBC Count 6.7 4.0 - 11.0 10e3/uL    RBC Count 4.00 3.80 - 5.20 10e6/uL     Hemoglobin 13.4 11.7 - 15.7 g/dL    Hematocrit 38.1 35.0 - 47.0 %    MCV 95 78 - 100 fL    MCH 33.5 (H) 26.5 - 33.0 pg    MCHC 35.2 31.5 - 36.5 g/dL    RDW 11.8 10.0 - 15.0 %    Platelet Count 332 150 - 450 10e3/uL    % Neutrophils 55 %    % Lymphocytes 36 %    % Monocytes 7 %    Mids % (Monos, Eos, Basos)      % Eosinophils 1 %    % Basophils 1 %    % Immature Granulocytes 0 %    NRBCs per 100 WBC 0 <1 /100    Absolute Neutrophils 3.7 1.6 - 8.3 10e3/uL    Absolute Lymphocytes 2.4 0.8 - 5.3 10e3/uL    Absolute Monocytes 0.5 0.0 - 1.3 10e3/uL    Mids Abs (Monos, Eos, Basos)      Absolute Eosinophils 0.1 0.0 - 0.7 10e3/uL    Absolute Basophils 0.1 0.0 - 0.2 10e3/uL    Absolute Immature Granulocytes 0.0 <=0.4 10e3/uL    Absolute NRBCs 0.0 10e3/uL   Extra SST Tube (LAB USE ONLY)     Status: None   Result Value Ref Range    Hold Specimen x    CBC with Platelets & Differential     Status: Abnormal    Narrative    The following orders were created for panel order CBC with Platelets & Differential.  Procedure                               Abnormality         Status                     ---------                               -----------         ------                     CBC with platelets and d...[[id number]]  Abnormal            Final result                 Please view results for these tests on the individual orders.      ASSESSMENT/Plan :    Radha was seen today for physical.    Diagnoses and all orders for this visit:    Visit for preventive health examination  -     Extra SST Tube (LAB USE ONLY)    Screening for deficiency anemia  -     CBC with Platelets & Differential; Future  -     CBC with Platelets & Differential    Screening for metabolic disorder  -     Comprehensive metabolic panel; Future  -     Comprehensive metabolic panel    Screening for hyperlipidemia  -     Lipid Profile; Future  -     Lipid Profile    Screening for hypothyroidism  -     TSH with free T4 reflex; Future  -     TSH with free T4  "reflex    FH: colon cancer    H/O adenomatous polyp of colon    Visit for screening mammogram  -     MA Screening Bilateral w/ Tonny; Future    Irritable bowel syndrome, unspecified type  -     linaclotide (LINZESS) 72 MCG capsule; Take 1 capsule (72 mcg) by mouth daily    Screening for diabetes mellitus  -     Hemoglobin A1c; Future  -     Hemoglobin A1c    Seasonal allergic rhinitis due to pollen  -     fluticasone (FLONASE) 50 MCG/ACT nasal spray; INHALE 2 SPRAYS INTO BOTH NOSTRILS ONCE DAILY.    Anxiety state  -     traZODone (DESYREL) 100 MG tablet; TAKE 3-4 TABLETS BY MOUTH AT BEDTIME.  -     FLUoxetine (PROZAC) 20 MG capsule; Take 1 capsule (20 mg) by mouth daily    Recurrent major depression in partial remission (H24)  -     traZODone (DESYREL) 100 MG tablet; TAKE 3-4 TABLETS BY MOUTH AT BEDTIME.    Nerve pain  -     gabapentin (NEURONTIN) 300 MG capsule; Take 1 capsule (300 mg) by mouth 3 times daily    Chronic allergic rhinitis due to pollen  -     cetirizine-pseudoePHEDrine ER (ZYRTEC-D ALLERGY & CONGESTION) 5-120 MG 12 hr tablet; TAKE 1 TABLET BY MOUTH TWICE DAILY    Other orders  -     COVID-19 12+ (2023-24) (PFIZER)  -     INFLUENZA VACCINE 18-64Y (FLUBLOK)      Will notify of lab results when available. Discussed diet, exercise and healthy lifestyle changes.     Patient instructions as follows: \"Drop the Effexor down to 75 mg daily for the next month.  Start Prozac 20 mg daily.  After a month, we should see you back and we likely will continue to drop the Effexor and possibly at that visit and possibly add some Wellbutrin. We likely will add some Adderall XR at some point.     We will have you use gabapentin for nerve pain. It may also work for anxiety.\"    Continue with other medications the same.    Discussed Shingrix in the future    COVID booster and flu vaccine given today.  Follow-up in 1 month, sooner if needed      Rashad Adhikari MD        Answers submitted by the patient for this " visit:  Patient Health Questionnaire (Submitted on 10/4/2023)  If you checked off any problems, how difficult have these problems made it for you to do your work, take care of things at home, or get along with other people?: Extremely difficult  PHQ9 TOTAL SCORE: 7  Annual Preventive Visit (Submitted on 10/4/2023)  Chief Complaint: Annual Exam:  Frequency of exercise:: 2-3 days/week  Getting at least 3 servings of Calcium per day:: Yes  Diet:: Other  Taking medications regularly:: Yes  Medication side effects:: None  Bi-annual eye exam:: Yes  Dental care twice a year:: Yes  Sleep apnea or symptoms of sleep apnea:: Daytime drowsiness  abdominal pain: No  Blood in stool: No  Blood in urine: No  chest pain: No  chills: No  congestion: Yes  constipation: No  cough: No  diarrhea: No  dizziness: No  ear pain: Yes  eye pain: No  nervous/anxious: Yes  fever: No  frequency: No  genital sores: No  headaches: No  hearing loss: No  heartburn: No  arthralgias: Yes  joint swelling: Yes  peripheral edema: No  mood changes: Yes  myalgias: No  nausea: No  dysuria: No  palpitations: No  Skin sensation changes: No  sore throat: Yes  urgency: No  rash: No  shortness of breath: No  visual disturbance: No  weakness: No  pelvic pain: No  vaginal bleeding: No  vaginal discharge: No  tenderness: No  breast mass: No  breast discharge: No  Additional concerns today:: Yes  Exercise outside of work (Submitted on 10/4/2023)  Chief Complaint: Annual Exam:  Duration of exercise:: 30-45 minutes

## 2023-10-04 NOTE — PROGRESS NOTES
"Nursing Notes:   Leydi Smart LPN  10/4/2023  4:14 PM  Sign at exiting of workspace  Chief Complaint   Patient presents with    Physical       Initial /76   Pulse 94   Temp 97.4  F (36.3  C) (Temporal)   Resp 18   Ht 1.53 m (5' 0.25\")   Wt 77.2 kg (170 lb 3.2 oz)   LMP  (LMP Unknown)   SpO2 99%   Breastfeeding No   BMI 32.96 kg/m   Estimated body mass index is 32.96 kg/m  as calculated from the following:    Height as of this encounter: 1.53 m (5' 0.25\").    Weight as of this encounter: 77.2 kg (170 lb 3.2 oz).  Medication Reconciliation: complete      Advance care directive on file? no  Advance care directive provided to patient? declined     Leydi Smart LPN    SUBJECTIVE:  Radha Ruzi  is a 48 year old female who comes in today for preventative visit.  I saw her last fall for preop prior to nasal septal surgery with Dr. Stoddard. She was scheduled to have stents in her nose, but that was cancelled. She is scheduled to see a plastic surgeon/ENT who was from the [facility] whose office is in [place].  She has sharp pain that is like nerve pain from her left nose to her upper left tooth. She is cleaning her nose from time to time.  She sees the specialist on December 5.    She continues on Linzess for irritable bowel syndrome which seems to be working reasonably well.    She has more anxiety. She has some attention issues. She is keeping lists and trying to be more organized. She has poor concentration. She doesn't think the Effexor is working as well. She feels fidgety. She is currently on 225 mg daily.       She is due for mammogram.  She is due for follow-up colonoscopy.  Had sessile serrated adenoma 3 years ago and 3-year follow-up was recommended.  She has a family history of colon cancer. It is scheduled.     She is status post hysterectomy and then had an anterior posterior colporrhaphy with enterocele repair in 2017.    She is up-to-date on Boostrix.  She is vaccinated and boosted " for COVID-19 but has not had the new booster.  She has not had her flu shot yet this year.  She just turned 48.  She has not had Shingrix.    Past Medical, Family, and Social History reviewed and updated as noted below.   ROS is negative except as noted above       Allergies   Allergen Reactions    Sulfa Antibiotics Hives   ,   Family History   Problem Relation Age of Onset    Colon Cancer Father 38        Cancer-colon,Colon cancer    Other - See Comments Mother         Psychiatric illness,Untreated anxiety/D&C for postmenopausal bleeding benign    Cancer Maternal Grandfather         Cancer,Brain    Heart Disease Maternal Grandfather         Heart Disease,MI    Hypertension Maternal Grandfather         Hypertension    Other - See Comments Maternal Uncle         Hemochromatosis    Hypertension Maternal Grandmother         Hypertension    Other - See Comments Maternal Grandmother         Alzheimer's    Diabetes Other         Diabetes,Diabetes    Heart Disease Paternal Grandmother         Heart Disease,CHF    Cancer Maternal Uncle         Cancer,cancer all over.    Cancer Paternal Uncle         Cancer,Lung cancer    Breast Cancer No family hx of         Cancer-breast   ,   Current Outpatient Medications   Medication    beclomethasone (QNASL) 80 MCG/ACT nasal aerosol    bisacodyl (DULCOLAX) 5 MG EC tablet    budesonide (PULMICORT) 0.5 MG/2ML neb solution    cetirizine-pseudoePHEDrine ER (ZYRTEC-D ALLERGY & CONGESTION) 5-120 MG 12 hr tablet    FLUoxetine (PROZAC) 20 MG capsule    fluticasone (FLONASE) 50 MCG/ACT nasal spray    gabapentin (NEURONTIN) 300 MG capsule    linaclotide (LINZESS) 72 MCG capsule    traZODone (DESYREL) 100 MG tablet    venlafaxine (EFFEXOR XR) 75 MG 24 hr capsule    Vitamin D, Cholecalciferol, 25 MCG (1000 UT) CAPS     No current facility-administered medications for this visit.   ,   Past Medical History:   Diagnosis Date    Encounter for gynecological examination without abnormal finding      08,Satisfactory GYN examination    Other pulmonary embolism without acute cor pulmonale (H)     History of pulmonary emboli after     Personal history of other medical treatment (CODE)      2, para 1-0-1-1    Personal history of other medical treatment (CODE)     10/00,History of blood transfusion with    ,   Patient Active Problem List    Diagnosis Date Noted    Polyp of ascending colon, unspecified type 10/02/2020     Priority: Medium     Added automatically from request for surgery [id number]      Tobacco abuse 2018     Priority: Medium    Chronic allergic rhinitis due to pollen, unspecified seasonality 2018     Priority: Medium    Anxiety state 2018     Priority: Medium    Constipation 2018     Priority: Medium    Migraine headache 2018     Priority: Medium    Restless leg syndrome 2018     Priority: Medium    Surgical menopause 2015     Priority: Medium    H/O adenomatous polyp of colon 10/06/2014     Priority: Medium    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     ,   Past Surgical History:   Procedure Laterality Date    ant/post colporr w enterocele incl cysturethroscopy  2017    Dr. Mc     SECTION      10/00 /06,History of blood transfusion with     COLONOSCOPY      ,,,F/U 2019    COLONOSCOPY N/A 2020    Procedure: COLONOSCOPY, WITH POLYPECTOMY AND BIOPSY;  Surgeon: Rachel Acevedo MD;  Location: [facility] OR    COLONOSCOPY N/A 10/12/2020    1 sessile serrated, 1 tubular - f/u 3 years    Cryotherapy of Cervix      LAPAROSCOPIC ASSISTED HYSTERECTOMY VAGINAL, BILATERAL SALPINGO-OOPHORECTOMY, COMBINED      [facility]    LAPAROSCOPIC  "TUBAL LIGATION      2006    SEPTOPLASTY, TURBINOPLASTY, COMBINED N/A 12/20/2022    Procedure: Septoplasty, Bilateral turbinate reduction, excision left kiersten bullosa;  Surgeon: Laurie Stoddard MD;  Location: [facility] OR    and   Social History     Tobacco Use    Smoking status: Every Day     Packs/day: 0.25     Years: 30.00     Pack years: 7.50     Types: Cigarettes    Smokeless tobacco: Never   Substance Use Topics    Alcohol use: Yes     Comment: Alcoholic Drinks/day: vodka 4-5 times a week     OBJECTIVE:  /76   Pulse 94   Temp 97.4  F (36.3  C) (Temporal)   Resp 18   Ht 1.53 m (5' 0.25\")   Wt 77.2 kg (170 lb 3.2 oz)   LMP  (LMP Unknown)   SpO2 99%   Breastfeeding No   BMI 32.96 kg/m     EXAM:  General Appearance: Pleasant, alert, appropriate appearance for age. No acute distress  Head Exam: Normal. Normocephalic, atraumatic.  Eye Exam: PERRLA, EOMI, conjunctivae, sclerae normal.  Ear Exam: Normal TM's bilaterally. Normal auditory canals and external ears. Non-tender.  Nose Exam: Normal external nose, mucus membranes, and septum.  OroPharynx Exam:  Dental hygiene adequate. Normal buccal mucosa. Normal pharynx.  Neck Exam:  Supple, no masses or nodes. No bruits  Thyroid Exam: No nodules or enlargement.  Chest/Respiratory Exam: Normal chest wall and respirations. Clear to auscultation.  Breast Exam: No dimpling, nipple retraction or discharge. No masses or nodes.  Cardiovascular Exam: Regular rate and rhythm. S1, S2, no murmur, click, gallop, or rubs.  Gastrointestinal Exam: Soft, non-tender, no masses or organomegaly.  Lymphatic Exam: Non-palpable nodes in neck,clavicular, axillary, or inguinal regions.  Musculoskeletal Exam: Back is straight and non-tender, full ROM of upper and lower extremities.  Foot Exam: Left and right foot: good pedal pulses  Skin: no rash or abnormalities  Neurologic Exam:  normal gross motor, tone coordination and no tremor.  Psychiatric Exam: Alert and oriented - " appropriate affect.    Results for orders placed or performed in visit on 10/04/23   Hemoglobin A1c     Status: Normal   Result Value Ref Range    Hemoglobin A1C 5.4 4.0 - 6.2 %   TSH with free T4 reflex     Status: Normal   Result Value Ref Range    TSH 1.54 0.30 - 4.20 uIU/mL   Lipid Profile     Status: Abnormal   Result Value Ref Range    Cholesterol 195 <200 mg/dL    Triglycerides 118 <150 mg/dL    Direct Measure HDL 64 >=50 mg/dL    LDL Cholesterol Calculated 107 (H) <=100 mg/dL    Non HDL Cholesterol 131 (H) <130 mg/dL    Narrative    Cholesterol  Desirable:  <200 mg/dL    Triglycerides  Normal:  Less than 150 mg/dL  Borderline High:  150-199 mg/dL  High:  200-499 mg/dL  Very High:  Greater than or equal to 500 mg/dL    Direct Measure HDL  Female:  Greater than or equal to 50 mg/dL   Male:  Greater than or equal to 40 mg/dL    LDL Cholesterol  Desirable:  <100mg/dL  Above Desirable:  100-129 mg/dL   Borderline High:  130-159 mg/dL   High:  160-189 mg/dL   Very High:  >= 190 mg/dL    Non HDL Cholesterol  Desirable:  130 mg/dL  Above Desirable:  130-159 mg/dL  Borderline High:  160-189 mg/dL  High:  190-219 mg/dL  Very High:  Greater than or equal to 220 mg/dL   Comprehensive metabolic panel     Status: Abnormal   Result Value Ref Range    Sodium 136 135 - 145 mmol/L    Potassium 4.3 3.4 - 5.3 mmol/L    Carbon Dioxide (CO2) 27 22 - 29 mmol/L    Anion Gap 8 7 - 15 mmol/L    Urea Nitrogen 3.9 (L) 6.0 - 20.0 mg/dL    Creatinine 0.73 0.51 - 0.95 mg/dL    GFR Estimate >90 >60 mL/min/1.73m2    Calcium 9.9 8.6 - 10.0 mg/dL    Chloride 101 98 - 107 mmol/L    Glucose 85 70 - 99 mg/dL    Alkaline Phosphatase 84 35 - 104 U/L    AST 30 0 - 45 U/L    ALT 28 0 - 50 U/L    Protein Total 7.4 6.4 - 8.3 g/dL    Albumin 4.9 3.5 - 5.2 g/dL    Bilirubin Total 0.2 <=1.2 mg/dL   CBC with platelets and differential     Status: Abnormal   Result Value Ref Range    WBC Count 6.7 4.0 - 11.0 10e3/uL    RBC Count 4.00 3.80 - 5.20 10e6/uL     Hemoglobin 13.4 11.7 - 15.7 g/dL    Hematocrit 38.1 35.0 - 47.0 %    MCV 95 78 - 100 fL    MCH 33.5 (H) 26.5 - 33.0 pg    MCHC 35.2 31.5 - 36.5 g/dL    RDW 11.8 10.0 - 15.0 %    Platelet Count 332 150 - 450 10e3/uL    % Neutrophils 55 %    % Lymphocytes 36 %    % Monocytes 7 %    Mids % (Monos, Eos, Basos)      % Eosinophils 1 %    % Basophils 1 %    % Immature Granulocytes 0 %    NRBCs per 100 WBC 0 <1 /100    Absolute Neutrophils 3.7 1.6 - 8.3 10e3/uL    Absolute Lymphocytes 2.4 0.8 - 5.3 10e3/uL    Absolute Monocytes 0.5 0.0 - 1.3 10e3/uL    Mids Abs (Monos, Eos, Basos)      Absolute Eosinophils 0.1 0.0 - 0.7 10e3/uL    Absolute Basophils 0.1 0.0 - 0.2 10e3/uL    Absolute Immature Granulocytes 0.0 <=0.4 10e3/uL    Absolute NRBCs 0.0 10e3/uL   Extra SST Tube (LAB USE ONLY)     Status: None   Result Value Ref Range    Hold Specimen x    CBC with Platelets & Differential     Status: Abnormal    Narrative    The following orders were created for panel order CBC with Platelets & Differential.  Procedure                               Abnormality         Status                     ---------                               -----------         ------                     CBC with platelets and d...[[id number]]  Abnormal            Final result                 Please view results for these tests on the individual orders.      ASSESSMENT/Plan :    Radha was seen today for physical.    Diagnoses and all orders for this visit:    Visit for preventive health examination  -     Extra SST Tube (LAB USE ONLY)    Screening for deficiency anemia  -     CBC with Platelets & Differential; Future  -     CBC with Platelets & Differential    Screening for metabolic disorder  -     Comprehensive metabolic panel; Future  -     Comprehensive metabolic panel    Screening for hyperlipidemia  -     Lipid Profile; Future  -     Lipid Profile    Screening for hypothyroidism  -     TSH with free T4 reflex; Future  -     TSH with free T4  "reflex    FH: colon cancer    H/O adenomatous polyp of colon    Visit for screening mammogram  -     MA Screening Bilateral w/ Tonny; Future    Irritable bowel syndrome, unspecified type  -     linaclotide (LINZESS) 72 MCG capsule; Take 1 capsule (72 mcg) by mouth daily    Screening for diabetes mellitus  -     Hemoglobin A1c; Future  -     Hemoglobin A1c    Seasonal allergic rhinitis due to pollen  -     fluticasone (FLONASE) 50 MCG/ACT nasal spray; INHALE 2 SPRAYS INTO BOTH NOSTRILS ONCE DAILY.    Anxiety state  -     traZODone (DESYREL) 100 MG tablet; TAKE 3-4 TABLETS BY MOUTH AT BEDTIME.  -     FLUoxetine (PROZAC) 20 MG capsule; Take 1 capsule (20 mg) by mouth daily    Recurrent major depression in partial remission (H24)  -     traZODone (DESYREL) 100 MG tablet; TAKE 3-4 TABLETS BY MOUTH AT BEDTIME.    Nerve pain  -     gabapentin (NEURONTIN) 300 MG capsule; Take 1 capsule (300 mg) by mouth 3 times daily    Chronic allergic rhinitis due to pollen  -     cetirizine-pseudoePHEDrine ER (ZYRTEC-D ALLERGY & CONGESTION) 5-120 MG 12 hr tablet; TAKE 1 TABLET BY MOUTH TWICE DAILY    Other orders  -     COVID-19 12+ (2023-24) (PFIZER)  -     INFLUENZA VACCINE 18-64Y (FLUBLOK)      Will notify of lab results when available. Discussed diet, exercise and healthy lifestyle changes.     Patient instructions as follows: \"Drop the Effexor down to 75 mg daily for the next month.  Start Prozac 20 mg daily.  After a month, we should see you back and we likely will continue to drop the Effexor and possibly at that visit and possibly add some Wellbutrin. We likely will add some Adderall XR at some point.     We will have you use gabapentin for nerve pain. It may also work for anxiety.\"    Continue with other medications the same.    Discussed Shingrix in the future    COVID booster and flu vaccine given today.  Follow-up in 1 month, sooner if needed      Rashad Adhikari MD        Answers submitted by the patient for this " visit:  Patient Health Questionnaire (Submitted on 10/4/2023)  If you checked off any problems, how difficult have these problems made it for you to do your work, take care of things at home, or get along with other people?: Extremely difficult  PHQ9 TOTAL SCORE: 7  Annual Preventive Visit (Submitted on 10/4/2023)  Chief Complaint: Annual Exam:  Frequency of exercise:: 2-3 days/week  Getting at least 3 servings of Calcium per day:: Yes  Diet:: Other  Taking medications regularly:: Yes  Medication side effects:: None  Bi-annual eye exam:: Yes  Dental care twice a year:: Yes  Sleep apnea or symptoms of sleep apnea:: Daytime drowsiness  abdominal pain: No  Blood in stool: No  Blood in urine: No  chest pain: No  chills: No  congestion: Yes  constipation: No  cough: No  diarrhea: No  dizziness: No  ear pain: Yes  eye pain: No  nervous/anxious: Yes  fever: No  frequency: No  genital sores: No  headaches: No  hearing loss: No  heartburn: No  arthralgias: Yes  joint swelling: Yes  peripheral edema: No  mood changes: Yes  myalgias: No  nausea: No  dysuria: No  palpitations: No  Skin sensation changes: No  sore throat: Yes  urgency: No  rash: No  shortness of breath: No  visual disturbance: No  weakness: No  pelvic pain: No  vaginal bleeding: No  vaginal discharge: No  tenderness: No  breast mass: No  breast discharge: No  Additional concerns today:: Yes  Exercise outside of work (Submitted on 10/4/2023)  Chief Complaint: Annual Exam:  Duration of exercise:: 30-45 minutes

## 2023-10-04 NOTE — NURSING NOTE
"Chief Complaint   Patient presents with    Physical       Initial /76   Pulse 94   Temp 97.4  F (36.3  C) (Temporal)   Resp 18   Ht 1.53 m (5' 0.25\")   Wt 77.2 kg (170 lb 3.2 oz)   LMP  (LMP Unknown)   SpO2 99%   Breastfeeding No   BMI 32.96 kg/m   Estimated body mass index is 32.96 kg/m  as calculated from the following:    Height as of this encounter: 1.53 m (5' 0.25\").    Weight as of this encounter: 77.2 kg (170 lb 3.2 oz).  Medication Reconciliation: complete      Advance care directive on file? no  Advance care directive provided to patient? declined     Leydi Smart LPN    "

## 2023-10-26 ENCOUNTER — ANESTHESIA (OUTPATIENT)
Dept: SURGERY | Facility: OTHER | Age: 48
End: 2023-10-26
Payer: COMMERCIAL

## 2023-10-26 ENCOUNTER — HOSPITAL ENCOUNTER (OUTPATIENT)
Facility: OTHER | Age: 48
Discharge: HOME | End: 2023-10-26
Attending: SURGERY | Admitting: SURGERY
Payer: COMMERCIAL

## 2023-10-26 ENCOUNTER — ANESTHESIA EVENT (OUTPATIENT)
Dept: SURGERY | Facility: OTHER | Age: 48
End: 2023-10-26
Payer: COMMERCIAL

## 2023-10-26 VITALS
TEMPERATURE: 96.8 F | DIASTOLIC BLOOD PRESSURE: 59 MMHG | OXYGEN SATURATION: 99 % | WEIGHT: 170 LBS | SYSTOLIC BLOOD PRESSURE: 106 MMHG | RESPIRATION RATE: 16 BRPM | HEART RATE: 73 BPM | BODY MASS INDEX: 32.93 KG/M2

## 2023-10-26 DIAGNOSIS — K58.9 IRRITABLE BOWEL SYNDROME, UNSPECIFIED TYPE: ICD-10-CM

## 2023-10-26 DIAGNOSIS — J34.2 DNS (DEVIATED NASAL SEPTUM): ICD-10-CM

## 2023-10-26 DIAGNOSIS — J34.3 NASAL TURBINATE HYPERTROPHY: ICD-10-CM

## 2023-10-26 DIAGNOSIS — J34.829 NASAL VALVE COLLAPSE: ICD-10-CM

## 2023-10-26 DIAGNOSIS — F41.1 ANXIETY STATE: ICD-10-CM

## 2023-10-26 DIAGNOSIS — F33.41 DEPRESSION, MAJOR, RECURRENT, IN PARTIAL REMISSION (H): ICD-10-CM

## 2023-10-26 PROCEDURE — 45378 DIAGNOSTIC COLONOSCOPY: CPT | Performed by: NURSE ANESTHETIST, CERTIFIED REGISTERED

## 2023-10-26 PROCEDURE — 250N000011 HC RX IP 250 OP 636: Performed by: NURSE ANESTHETIST, CERTIFIED REGISTERED

## 2023-10-26 PROCEDURE — 45378 DIAGNOSTIC COLONOSCOPY: CPT | Performed by: SURGERY

## 2023-10-26 PROCEDURE — 250N000009 HC RX 250: Performed by: NURSE ANESTHETIST, CERTIFIED REGISTERED

## 2023-10-26 PROCEDURE — 258N000003 HC RX IP 258 OP 636: Performed by: SURGERY

## 2023-10-26 PROCEDURE — G0105 COLORECTAL SCRN; HI RISK IND: HCPCS | Performed by: SURGERY

## 2023-10-26 RX ORDER — SODIUM CHLORIDE, SODIUM LACTATE, POTASSIUM CHLORIDE, CALCIUM CHLORIDE 600; 310; 30; 20 MG/100ML; MG/100ML; MG/100ML; MG/100ML
INJECTION, SOLUTION INTRAVENOUS CONTINUOUS
Status: DISCONTINUED | OUTPATIENT
Start: 2023-10-26 | End: 2023-10-26 | Stop reason: HOSPADM

## 2023-10-26 RX ORDER — NALOXONE HYDROCHLORIDE 0.4 MG/ML
0.2 INJECTION, SOLUTION INTRAMUSCULAR; INTRAVENOUS; SUBCUTANEOUS
Status: DISCONTINUED | OUTPATIENT
Start: 2023-10-26 | End: 2023-10-26 | Stop reason: HOSPADM

## 2023-10-26 RX ORDER — LIDOCAINE 40 MG/G
CREAM TOPICAL
Status: DISCONTINUED | OUTPATIENT
Start: 2023-10-26 | End: 2023-10-26 | Stop reason: HOSPADM

## 2023-10-26 RX ORDER — BUDESONIDE 0.5 MG/2ML
0.5 INHALANT ORAL DAILY
DISCHARGE
Start: 2023-10-26 | End: 2024-08-07

## 2023-10-26 RX ORDER — FLUMAZENIL 0.1 MG/ML
0.2 INJECTION, SOLUTION INTRAVENOUS
Status: DISCONTINUED | OUTPATIENT
Start: 2023-10-26 | End: 2023-10-26 | Stop reason: HOSPADM

## 2023-10-26 RX ORDER — NALOXONE HYDROCHLORIDE 0.4 MG/ML
0.4 INJECTION, SOLUTION INTRAMUSCULAR; INTRAVENOUS; SUBCUTANEOUS
Status: DISCONTINUED | OUTPATIENT
Start: 2023-10-26 | End: 2023-10-26 | Stop reason: HOSPADM

## 2023-10-26 RX ORDER — PROPOFOL 10 MG/ML
INJECTION, EMULSION INTRAVENOUS CONTINUOUS PRN
Status: DISCONTINUED | OUTPATIENT
Start: 2023-10-26 | End: 2023-10-26

## 2023-10-26 RX ORDER — PROPOFOL 10 MG/ML
INJECTION, EMULSION INTRAVENOUS PRN
Status: DISCONTINUED | OUTPATIENT
Start: 2023-10-26 | End: 2023-10-26

## 2023-10-26 RX ORDER — VENLAFAXINE HYDROCHLORIDE 75 MG/1
75 CAPSULE, EXTENDED RELEASE ORAL DAILY
DISCHARGE
Start: 2023-10-26 | End: 2023-11-01

## 2023-10-26 RX ORDER — LIDOCAINE HYDROCHLORIDE 20 MG/ML
INJECTION, SOLUTION INFILTRATION; PERINEURAL PRN
Status: DISCONTINUED | OUTPATIENT
Start: 2023-10-26 | End: 2023-10-26

## 2023-10-26 RX ADMIN — LIDOCAINE HYDROCHLORIDE 40 MG: 20 INJECTION, SOLUTION INFILTRATION; PERINEURAL at 11:47

## 2023-10-26 RX ADMIN — PROPOFOL 150 MCG/KG/MIN: 10 INJECTION, EMULSION INTRAVENOUS at 11:47

## 2023-10-26 RX ADMIN — SODIUM CHLORIDE, POTASSIUM CHLORIDE, SODIUM LACTATE AND CALCIUM CHLORIDE: 600; 310; 30; 20 INJECTION, SOLUTION INTRAVENOUS at 11:14

## 2023-10-26 RX ADMIN — PROPOFOL 100 MG: 10 INJECTION, EMULSION INTRAVENOUS at 11:47

## 2023-10-26 ASSESSMENT — ACTIVITIES OF DAILY LIVING (ADL)
ADLS_ACUITY_SCORE: 35
ADLS_ACUITY_SCORE: 35

## 2023-10-26 ASSESSMENT — LIFESTYLE VARIABLES: TOBACCO_USE: 1

## 2023-10-26 NOTE — ANESTHESIA PREPROCEDURE EVALUATION
Anesthesia Pre-Procedure Evaluation    Patient: Radha Ruiz   MRN: [id number] : 1975        Procedure : Procedure(s):  Colonoscopy          Past Medical History:   Diagnosis Date    Encounter for gynecological examination without abnormal finding     08,Satisfactory GYN examination    Other pulmonary embolism without acute cor pulmonale (H)     History of pulmonary emboli after     Personal history of other medical treatment (CODE)      2, para 1-0-1-1    Personal history of other medical treatment (CODE)     10/00,History of blood transfusion with       Past Surgical History:   Procedure Laterality Date    ant/post colporr w enterocele incl cysturethroscopy  2017    Dr. Mc     SECTION      10/00 /06,History of blood transfusion with     COLONOSCOPY      ,,,F/U 2019    COLONOSCOPY N/A 2020    Procedure: COLONOSCOPY, WITH POLYPECTOMY AND BIOPSY;  Surgeon: Rachel Acevedo MD;  Location: [facility] OR    COLONOSCOPY N/A 10/12/2020    1 sessile serrated, 1 tubular - f/u 3 years    Cryotherapy of Cervix      LAPAROSCOPIC ASSISTED HYSTERECTOMY VAGINAL, BILATERAL SALPINGO-OOPHORECTOMY, COMBINED      Ney Hart MD [facility]    LAPAROSCOPIC TUBAL LIGATION      2006    SEPTOPLASTY, TURBINOPLASTY, COMBINED N/A 2022    Procedure: Septoplasty, Bilateral turbinate reduction, excision left kiersten bullosa;  Surgeon: Laurie Stoddard MD;  Location: [facility] OR      Allergies   Allergen Reactions    Sulfa Antibiotics Hives      Social History     Tobacco Use    Smoking status: Every Day     Packs/day: 0.25     Years: 30.00     Additional pack years: 0.00     Total pack years: 7.50     Types: Cigarettes    Smokeless tobacco: Never   Substance Use Topics    Alcohol use: Yes     Comment: Alcoholic Drinks/day: vodka 4-5 times a week      Wt Readings from Last 1 Encounters:   10/26/23 77.1 kg (170 lb)        Anesthesia Evaluation   Pt has had  "prior anesthetic.     No history of anesthetic complications       ROS/MED HX  ENT/Pulmonary:     (+)           allergic rhinitis,     tobacco use, Current use,                      Neurologic:     (+)      migraines,                          Cardiovascular:  - neg cardiovascular ROS     METS/Exercise Tolerance: >4 METS    Hematologic:  - neg hematologic  ROS     Musculoskeletal:  - neg musculoskeletal ROS     GI/Hepatic:     (+) GERD,                   Renal/Genitourinary:  - neg Renal ROS     Endo:  - neg endo ROS     Psychiatric/Substance Use:     (+) psychiatric history anxiety and depression       Infectious Disease:  - neg infectious disease ROS     Malignancy:  - neg malignancy ROS     Other:  - neg other ROS          Physical Exam    Airway        Mallampati: II   TM distance: > 3 FB   Neck ROM: full   Mouth opening: > 3 cm    Respiratory Devices and Support         Dental       (+) Completely normal teeth      Cardiovascular   cardiovascular exam normal       Rhythm and rate: regular and normal     Pulmonary   pulmonary exam normal        breath sounds clear to auscultation           OUTSIDE LABS:  CBC:   Lab Results   Component Value Date    WBC 6.7 10/04/2023    WBC 9.3 12/13/2022    HGB 13.4 10/04/2023    HGB 13.5 12/13/2022    HCT 38.1 10/04/2023    HCT 37.7 12/13/2022     10/04/2023     12/13/2022     BMP:   Lab Results   Component Value Date     10/04/2023     (L) 12/13/2022    POTASSIUM 4.3 10/04/2023    POTASSIUM 4.1 12/13/2022    CHLORIDE 101 10/04/2023    CHLORIDE 98 12/13/2022    CO2 27 10/04/2023    CO2 23 12/13/2022    BUN 3.9 (L) 10/04/2023    BUN 8.6 12/13/2022    CR 0.73 10/04/2023    CR 0.65 12/13/2022    GLC 85 10/04/2023     (H) 12/13/2022     COAGS: No results found for: \"PTT\", \"INR\", \"FIBR\"  POC:   Lab Results   Component Value Date    HCG Negative 10/17/2012     HEPATIC:   Lab Results   Component Value Date    ALBUMIN 4.9 10/04/2023    PROTTOTAL 7.4 " 10/04/2023    ALT 28 10/04/2023    AST 30 10/04/2023    ALKPHOS 84 10/04/2023    BILITOTAL 0.2 10/04/2023     OTHER:   Lab Results   Component Value Date    A1C 5.4 10/04/2023    MARTINEZ 9.9 10/04/2023    TSH 1.54 10/04/2023       Anesthesia Plan    ASA Status:  2    NPO Status:  NPO Appropriate    Anesthesia Type: MAC.   Induction: Propofol.   Maintenance: Balanced.        Consents    Anesthesia Plan(s) and associated risks, benefits, and realistic alternatives discussed. Questions answered and patient/representative(s) expressed understanding.     - Discussed: Risks, Benefits and Alternatives for BOTH SEDATION and the PROCEDURE were discussed     - Discussed with:  Patient      - Extended Intubation/Ventilatory Support Discussed: No.      - Patient is DNR/DNI Status: No          Postoperative Care            Comments:                JOSE REHMAN CRNA

## 2023-10-26 NOTE — INTERVAL H&P NOTE
"I have reviewed the surgical (or preoperative) H&P that is linked to this encounter, and examined the patient. There are no significant changes    Clinical Conditions Present on Arrival:  Clinically Significant Risk Factors Present on Admission                  # Obesity: Estimated body mass index is 32.93 kg/m  as calculated from the following:    Height as of 10/4/23: 1.53 m (5' 0.25\").    Weight as of this encounter: 77.1 kg (170 lb).       "

## 2023-10-26 NOTE — DISCHARGE INSTRUCTIONS
[facility]  Adult Discharge Orders & Instructions    ________________________________________________________________          For 12 hours after surgery  Get plenty of rest.  A responsible adult must stay with you for at least 12 hours after you leave the hospital.   You may feel lightheaded.  IF so, sit for a few minutes before standing.  Have someone help you get up.   You may have a slight fever. Call the doctor if your fever is over 101 F (38.3 C) (taken under the tongue) or lasts longer than 24 hours.  You may have a dry mouth, a sore throat, muscle aches or trouble sleeping.  These should go away after 24 hours.  Do not make important or legal decisions.  6.   Do not drive or use heavy equipment.  If you have weakness or tingling, don't drive or use heavy equipment until this feeling goes away.    To contact a doctor, call   [phone]_______________________

## 2023-10-26 NOTE — OR NURSING
Patient was discharged home via ambulatory.   Discharge instructions were reviewed with patient and . And they verbalized understanding.   Prescriptions: none

## 2023-10-26 NOTE — ANESTHESIA POSTPROCEDURE EVALUATION
Patient: Radha Ruiz    Procedure: Procedure(s):  Colonoscopy       Anesthesia Type:  MAC    Note:  Disposition: Outpatient   Postop Pain Control: Uneventful            Sign Out: Well controlled pain   PONV: No   Neuro/Psych: Uneventful            Sign Out: Acceptable/Baseline neuro status   Airway/Respiratory: Uneventful            Sign Out: Acceptable/Baseline resp. status   CV/Hemodynamics: Uneventful            Sign Out: Acceptable CV status; No obvious hypovolemia; No obvious fluid overload   Other NRE: NONE   DID A NON-ROUTINE EVENT OCCUR?            Last vitals:  Vitals Value Taken Time   /59 10/26/23 1230   Temp 96.8  F (36  C) 10/26/23 1215   Pulse 73 10/26/23 1230   Resp     SpO2 99 % 10/26/23 1231   Vitals shown include unfiled device data.    Electronically Signed By: JOSE Alvarez CRNA  October 26, 2023  12:32 PM

## 2023-10-26 NOTE — ANESTHESIA CARE TRANSFER NOTE
Patient: Radha Ruiz    Procedure: Procedure(s):  Colonoscopy       Diagnosis: Special screening for malignant neoplasms, colon [Z12.11]  Diagnosis Additional Information: No value filed.    Anesthesia Type:   MAC     Note:    Oropharynx: oropharynx clear of all foreign objects and spontaneously breathing  Level of Consciousness: drowsy  Oxygen Supplementation: room air    Independent Airway: airway patency satisfactory and stable  Dentition: dentition unchanged  Vital Signs Stable: post-procedure vital signs reviewed and stable  Report to RN Given: handoff report given  Patient transferred to: Phase II    Handoff Report: Identifed the Patient, Identified the Reponsible Provider, Reviewed the pertinent medical history, Discussed the surgical course, Reviewed Intra-OP anesthesia mangement and issues during anesthesia, Set expectations for post-procedure period and Allowed opportunity for questions and acknowledgement of understanding    Vitals:  Vitals Value Taken Time   BP     Temp     Pulse     Resp     SpO2         Electronically Signed By: JOSE Alvarez CRNA  October 26, 2023  12:12 PM

## 2023-11-01 ENCOUNTER — OFFICE VISIT (OUTPATIENT)
Dept: FAMILY MEDICINE | Facility: OTHER | Age: 48
End: 2023-11-01
Attending: FAMILY MEDICINE
Payer: COMMERCIAL

## 2023-11-01 VITALS
WEIGHT: 171.2 LBS | HEIGHT: 60 IN | OXYGEN SATURATION: 98 % | TEMPERATURE: 98.1 F | HEART RATE: 94 BPM | RESPIRATION RATE: 18 BRPM | SYSTOLIC BLOOD PRESSURE: 118 MMHG | BODY MASS INDEX: 33.61 KG/M2 | DIASTOLIC BLOOD PRESSURE: 78 MMHG

## 2023-11-01 DIAGNOSIS — R41.840 ATTENTION AND CONCENTRATION DEFICIT: ICD-10-CM

## 2023-11-01 DIAGNOSIS — F41.1 ANXIETY STATE: Primary | ICD-10-CM

## 2023-11-01 DIAGNOSIS — F33.41 RECURRENT MAJOR DEPRESSION IN PARTIAL REMISSION (H): ICD-10-CM

## 2023-11-01 PROCEDURE — 99213 OFFICE O/P EST LOW 20 MIN: CPT | Performed by: FAMILY MEDICINE

## 2023-11-01 RX ORDER — DEXTROAMPHETAMINE SACCHARATE, AMPHETAMINE ASPARTATE, DEXTROAMPHETAMINE SULFATE AND AMPHETAMINE SULFATE 2.5; 2.5; 2.5; 2.5 MG/1; MG/1; MG/1; MG/1
10 TABLET ORAL 3 TIMES DAILY
Qty: 90 TABLET | Refills: 0 | Status: SHIPPED | OUTPATIENT
Start: 2023-11-01 | End: 2023-12-11

## 2023-11-01 ASSESSMENT — ANXIETY QUESTIONNAIRES
5. BEING SO RESTLESS THAT IT IS HARD TO SIT STILL: NEARLY EVERY DAY
GAD7 TOTAL SCORE: 8
4. TROUBLE RELAXING: SEVERAL DAYS
8. IF YOU CHECKED OFF ANY PROBLEMS, HOW DIFFICULT HAVE THESE MADE IT FOR YOU TO DO YOUR WORK, TAKE CARE OF THINGS AT HOME, OR GET ALONG WITH OTHER PEOPLE?: VERY DIFFICULT
7. FEELING AFRAID AS IF SOMETHING AWFUL MIGHT HAPPEN: NOT AT ALL
GAD7 TOTAL SCORE: 8
2. NOT BEING ABLE TO STOP OR CONTROL WORRYING: SEVERAL DAYS
6. BECOMING EASILY ANNOYED OR IRRITABLE: SEVERAL DAYS
GAD7 TOTAL SCORE: 8
3. WORRYING TOO MUCH ABOUT DIFFERENT THINGS: SEVERAL DAYS
7. FEELING AFRAID AS IF SOMETHING AWFUL MIGHT HAPPEN: NOT AT ALL
1. FEELING NERVOUS, ANXIOUS, OR ON EDGE: SEVERAL DAYS
IF YOU CHECKED OFF ANY PROBLEMS ON THIS QUESTIONNAIRE, HOW DIFFICULT HAVE THESE PROBLEMS MADE IT FOR YOU TO DO YOUR WORK, TAKE CARE OF THINGS AT HOME, OR GET ALONG WITH OTHER PEOPLE: VERY DIFFICULT

## 2023-11-01 ASSESSMENT — PATIENT HEALTH QUESTIONNAIRE - PHQ9
SUM OF ALL RESPONSES TO PHQ QUESTIONS 1-9: 7
10. IF YOU CHECKED OFF ANY PROBLEMS, HOW DIFFICULT HAVE THESE PROBLEMS MADE IT FOR YOU TO DO YOUR WORK, TAKE CARE OF THINGS AT HOME, OR GET ALONG WITH OTHER PEOPLE: VERY DIFFICULT
SUM OF ALL RESPONSES TO PHQ QUESTIONS 1-9: 7

## 2023-11-01 ASSESSMENT — PAIN SCALES - GENERAL: PAINLEVEL: NO PAIN (0)

## 2023-11-01 NOTE — NURSING NOTE
Patient is here for medication check to discuss anxiety meds.     No LMP recorded (lmp unknown). Patient has had a hysterectomy.  Medication Reconciliation: complete    Jena Webb LPN 11/1/2023 10:04 AM       Advance care directive on file? no  Advance care directive provided to patient? no       Jena Webb LPN

## 2023-11-01 NOTE — PROGRESS NOTES
Nursing Notes:   Jena Webb LPN  11/1/2023 10:04 AM  Sign at exiting of workspace  Patient is here for medication check to discuss anxiety meds.     No LMP recorded (lmp unknown). Patient has had a hysterectomy.  Medication Reconciliation: complete    Jena Webb LPN 11/1/2023 10:04 AM       Advance care directive on file? no  Advance care directive provided to patient? no       Jena Webb LPN  SUBJECTIVE:  Radha Ruiz  is a 48 year old female who comes in today for follow-up of anxiety.  We decided to adjust her medications when she was there for a physical a month ago.  We dropped her Effexor to 75 mg daily for a month and added Prozac 20 mg daily.  The plan was to see her back and potentially drop the Effexor further and possibly add some Wellbutrin and possibly even some Adderall XR .  We added some gabapentin for nerve pain and she is currently taking 300 mg 3 times daily.She went off the Effexor completely a week ago.     The gabapentin is working well for her sciatica.    She feels that the Prozac is working well so far. She is trying to figure out herself now that her kids are older. She has some anxiety about the empty nest.  She is having trouble with focus.     She did some testing for ADHD and it was mixed.         12/13/2022     3:43 PM 10/4/2023     3:21 PM 11/1/2023     9:56 AM   PHQ   PHQ-9 Total Score 6 7 7   Q9: Thoughts of better off dead/self-harm past 2 weeks Not at all Not at all Not at all         10/24/2022     7:43 AM 12/13/2022     3:46 PM 11/1/2023     9:57 AM   ERMELINDA-7 SCORE   Total Score 3 (minimal anxiety) 5 (mild anxiety) 8 (mild anxiety)   Total Score 3 5 8           Past Medical, Family, and Social History reviewed and updated as noted below.   ROS is negative except as noted above       Allergies   Allergen Reactions    Sulfa Antibiotics Hives   ,   Family History   Problem Relation Age of Onset    Colon Cancer Father 38        Cancer-colon,Colon cancer     Other - See Comments Mother         Psychiatric illness,Untreated anxiety/D&C for postmenopausal bleeding benign    Cancer Maternal Grandfather         Cancer,Brain    Heart Disease Maternal Grandfather         Heart Disease,MI    Hypertension Maternal Grandfather         Hypertension    Other - See Comments Maternal Uncle         Hemochromatosis    Hypertension Maternal Grandmother         Hypertension    Other - See Comments Maternal Grandmother         Alzheimer's    Diabetes Other         Diabetes,Diabetes    Heart Disease Paternal Grandmother         Heart Disease,CHF    Cancer Maternal Uncle         Cancer,cancer all over.    Cancer Paternal Uncle         Cancer,Lung cancer    Breast Cancer No family hx of         Cancer-breast   ,   Current Outpatient Medications   Medication    amphetamine-dextroamphetamine (ADDERALL) 10 MG tablet    beclomethasone (QNASL) 80 MCG/ACT nasal aerosol    budesonide (PULMICORT) 0.5 MG/2ML neb solution    cetirizine-pseudoePHEDrine ER (ZYRTEC-D ALLERGY & CONGESTION) 5-120 MG 12 hr tablet    FLUoxetine (PROZAC) 20 MG capsule    fluticasone (FLONASE) 50 MCG/ACT nasal spray    gabapentin (NEURONTIN) 300 MG capsule    linaclotide (LINZESS) 72 MCG capsule    traZODone (DESYREL) 100 MG tablet     No current facility-administered medications for this visit.   ,   Past Medical History:   Diagnosis Date    Encounter for gynecological examination without abnormal finding     08,Satisfactory GYN examination    Other pulmonary embolism without acute cor pulmonale (H)     History of pulmonary emboli after     Personal history of other medical treatment (CODE)      2, para 1-0-1-1    Personal history of other medical treatment (CODE)     10/00,History of blood transfusion with    ,   Patient Active Problem List    Diagnosis Date Noted    Polyp of ascending colon, unspecified type 10/02/2020     Priority: Medium     Added automatically from request for surgery  [id number]      Tobacco abuse 2018     Priority: Medium    Chronic allergic rhinitis due to pollen, unspecified seasonality 2018     Priority: Medium    Anxiety state 2018     Priority: Medium    Constipation 2018     Priority: Medium    Migraine headache 2018     Priority: Medium    Restless leg syndrome 2018     Priority: Medium    Surgical menopause 2015     Priority: Medium    H/O adenomatous polyp of colon 10/06/2014     Priority: Medium    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     ,   Past Surgical History:   Procedure Laterality Date    ant/post colporr w enterocele incl cysturethroscopy  2017    Dr. Mc     SECTION      10/00 /06,History of blood transfusion with     COLONOSCOPY      ,,,F/U 2019    COLONOSCOPY N/A 2020    Procedure: COLONOSCOPY, WITH POLYPECTOMY AND BIOPSY;  Surgeon: Rachel Acevedo MD;  Location:  OR    COLONOSCOPY N/A 10/12/2020    1 sessile serrated, 1 tubular - f/u 3 years    COLONOSCOPY N/A 10/26/2023    normal exam with iliocolic anastomosis and family history of large polyps, follow up 10/26/28    Cryotherapy of Cervix      LAPAROSCOPIC ASSISTED HYSTERECTOMY VAGINAL, BILATERAL SALPINGO-OOPHORECTOMY, COMBINED      Ney Hart MD [facility]    LAPAROSCOPIC TUBAL LIGATION          SEPTOPLASTY, TURBINOPLASTY, COMBINED N/A 2022    Procedure: Septoplasty, Bilateral turbinate reduction, excision left kiersten bullosa;  Surgeon: Laurie Stoddard MD;  Location: [facility]    and   Social History     Tobacco Use    Smoking status: Every Day     Packs/day: 0.25     Years: 30.00     Additional pack years: 0.00     Total pack years: 7.50     Types:  "Cigarettes     Passive exposure: Current    Smokeless tobacco: Never   Substance Use Topics    Alcohol use: Yes     Comment: Alcoholic Drinks/day: vodka 4-5 times a week     OBJECTIVE:  /78   Pulse 94   Temp 98.1  F (36.7  C) (Tympanic)   Resp 18   Ht 1.53 m (5' 0.25\")   Wt 77.7 kg (171 lb 3.2 oz)   LMP  (LMP Unknown)   SpO2 98%   Breastfeeding No   BMI 33.16 kg/m     EXAM:  Alert cooperative, no distress.  Affect is broad ranging and appropriate.  Depression and anxiety inventories as noted above.  ASSESSMENT/Plan :    Radha was seen today for recheck medication.    Diagnoses and all orders for this visit:    Anxiety state  -     Adult Mental Health  Referral; Future    Recurrent major depression in partial remission (H24)  -     Adult Mental Health  Referral; Future    Attention and concentration deficit  -     Adult Mental Health  Referral; Future  -     amphetamine-dextroamphetamine (ADDERALL) 10 MG tablet; Take 1 tablet (10 mg) by mouth 3 times daily      After lengthy discussion, we elected to continue with Prozac at 20 mg daily and gabapentin 300 mg 3 times daily.  We discussed starting Wellbutrin and elected to hold off on that.  We discussed using low-dose Adderall at 10 mg up to 3 times daily.  She can adjust the dose and see how things go.  She agrees to go to counseling and referral was sent for same.  She will follow-up with me in 1 month, sooner if needed    Rashad Adhikari MD            Answers submitted by the patient for this visit:  Patient Health Questionnaire (Submitted on 11/1/2023)  If you checked off any problems, how difficult have these problems made it for you to do your work, take care of things at home, or get along with other people?: Very difficult  PHQ9 TOTAL SCORE: 7  ERMELINDA-7 (Submitted on 11/1/2023)  ERMELINDA 7 TOTAL SCORE: 8  General Questionnaire (Submitted on 11/1/2023)  Chief Complaint: Chronic problems general questions HPI Form  What is the " reason for your visit today? : mental check  How many servings of fruits and vegetables do you eat daily?: 2-3  On average, how many sweetened beverages do you drink each day (Examples: soda, juice, sweet tea, etc.  Do NOT count diet or artificially sweetened beverages)?: 0  How many minutes a day do you exercise enough to make your heart beat faster?: 30 to 60  How many days a week do you exercise enough to make your heart beat faster?: 3 or less  How many days per week do you miss taking your medication?: 0

## 2023-11-01 NOTE — PROGRESS NOTES
"      Mike Garcia is a 48 year old, presenting for the following health issues:  Recheck Medication      11/1/2023    10:00 AM   Additional Questions   Roomed by Jena roe       History of Present Illness       Reason for visit:  Mental check    She eats 2-3 servings of fruits and vegetables daily.She consumes 0 sweetened beverage(s) daily.She exercises with enough effort to increase her heart rate 30 to 60 minutes per day.  She exercises with enough effort to increase her heart rate 3 or less days per week.   She is taking medications regularly.                 Review of Systems         Objective    /78   Pulse 94   Temp 98.1  F (36.7  C) (Tympanic)   Resp 18   Ht 1.53 m (5' 0.25\")   Wt 77.7 kg (171 lb 3.2 oz)   LMP  (LMP Unknown)   SpO2 98%   Breastfeeding No   BMI 33.16 kg/m    Body mass index is 33.16 kg/m .  Physical Exam                         "

## 2023-11-09 ENCOUNTER — PATIENT OUTREACH (OUTPATIENT)
Dept: GASTROENTEROLOGY | Facility: CLINIC | Age: 48
End: 2023-11-09
Payer: COMMERCIAL

## 2023-11-20 NOTE — OP NOTE
Dr Lei prescribed clobetasol (TEMOVATE) but when pt went to the pharmacy to pick it up it was extremely expensive ($300.00).  Is requesting an alternative medication that is less expensive.     Can someone please reach out to the pt.     Thank you   PROCEDURE NOTE    SURGEON:Chucho Mercado MD    PRE-OP DIAGNOSIS:  Screening Colonoscopy      POST-OP DIAGNOSIS: Normal with ileocolic anastomosis     PROCEDURE:   Colonoscopy    SPECIMEN:      * No specimens in log *    ANESTHESIA:  MAC CRNA Independent: Steve Vaz APRN CRNA   Coverage requested    ESTIMATED BLOOD LOSS: none    COMPLICATIONS:  None    INDICATION FOR THE PROCEDURE: The patient is a 48 year old female. The patient presents with history of large polyps requiring right colon resection for removal. I explained to the patient the risks, benefits and alternatives to screening colonoscopy for evaluating for cancer or polyps. We discussed the risks including bleeding, perforation, potential inability to reach the cecum and the risks of sedation. The patient's questions were answered and the patient wished to proceed. Informed consent paperwork was completed.    PROCEDURE: The patient was taken to the endoscopy suite. Appropriate monitors were attached. The patient was placed in the left lateral decubitus position. Timeout was performed confirming the patient's identity and procedure to be performed.  After appropriate sedation was confirmed, digital rectal exam was performed.  There was normal tone and no gross abnormality was noted.  The lubricated colonoscope was introduced into the anus the colon was insufflated with air. The prep quality was adequate. Under direct visualization the scope was advanced to the ileocolonic anastomosis.  The anastomosis was well-healed.. The mucosa of the colon was inspected while withdrawing the scope. No abnormalities were noted. The scope was retroflexed in the rectum and the anorectal junction was inspected. No abnormalities were noted. The scope was returned to a neutral position and the colon was decompressed. The scope was removed. The patient tolerated the procedure with no immediately apparent complication. The patient was taken to recovery in stable  condition.    FOLLOW UP: RECOMMEND high fiber diet, 5-year follow-up for family history and history of large polyps    Chucho Mercado MD on 10/26/2023 at 12:06 PM

## 2023-12-05 ENCOUNTER — TRANSFERRED RECORDS (OUTPATIENT)
Dept: HEALTH INFORMATION MANAGEMENT | Facility: HOSPITAL | Age: 48
End: 2023-12-05

## 2023-12-10 DIAGNOSIS — F33.41 DEPRESSION, MAJOR, RECURRENT, IN PARTIAL REMISSION (H): ICD-10-CM

## 2023-12-10 DIAGNOSIS — F41.1 ANXIETY STATE: ICD-10-CM

## 2023-12-11 ENCOUNTER — OFFICE VISIT (OUTPATIENT)
Dept: FAMILY MEDICINE | Facility: OTHER | Age: 48
End: 2023-12-11
Attending: FAMILY MEDICINE
Payer: COMMERCIAL

## 2023-12-11 VITALS
TEMPERATURE: 98 F | WEIGHT: 172.2 LBS | OXYGEN SATURATION: 98 % | BODY MASS INDEX: 33.81 KG/M2 | RESPIRATION RATE: 14 BRPM | SYSTOLIC BLOOD PRESSURE: 116 MMHG | HEART RATE: 81 BPM | DIASTOLIC BLOOD PRESSURE: 80 MMHG | HEIGHT: 60 IN

## 2023-12-11 DIAGNOSIS — F41.1 ANXIETY STATE: ICD-10-CM

## 2023-12-11 DIAGNOSIS — R41.840 ATTENTION AND CONCENTRATION DEFICIT: Primary | ICD-10-CM

## 2023-12-11 DIAGNOSIS — Z98.890 HISTORY OF NASAL SURGERY: ICD-10-CM

## 2023-12-11 DIAGNOSIS — R41.840 ATTENTION AND CONCENTRATION DEFICIT: ICD-10-CM

## 2023-12-11 DIAGNOSIS — F17.200 NICOTINE USE DISORDER: ICD-10-CM

## 2023-12-11 PROCEDURE — 99214 OFFICE O/P EST MOD 30 MIN: CPT | Performed by: FAMILY MEDICINE

## 2023-12-11 RX ORDER — METHYLPREDNISOLONE 4 MG
TABLET, DOSE PACK ORAL
COMMUNITY
Start: 2023-12-05 | End: 2024-01-08

## 2023-12-11 RX ORDER — DEXTROAMPHETAMINE SACCHARATE, AMPHETAMINE ASPARTATE, DEXTROAMPHETAMINE SULFATE AND AMPHETAMINE SULFATE 2.5; 2.5; 2.5; 2.5 MG/1; MG/1; MG/1; MG/1
10 TABLET ORAL 3 TIMES DAILY
Qty: 90 TABLET | Refills: 0 | Status: SHIPPED | OUTPATIENT
Start: 2023-12-11 | End: 2024-04-15

## 2023-12-11 RX ORDER — FLUOXETINE 40 MG/1
40 CAPSULE ORAL DAILY
Qty: 90 CAPSULE | Refills: 3 | Status: SHIPPED | OUTPATIENT
Start: 2023-12-11

## 2023-12-11 RX ORDER — DEXTROAMPHETAMINE SACCHARATE, AMPHETAMINE ASPARTATE, DEXTROAMPHETAMINE SULFATE AND AMPHETAMINE SULFATE 2.5; 2.5; 2.5; 2.5 MG/1; MG/1; MG/1; MG/1
10 TABLET ORAL 3 TIMES DAILY
Qty: 90 TABLET | Refills: 0 | Status: SHIPPED | OUTPATIENT
Start: 2023-12-11 | End: 2023-12-11

## 2023-12-11 ASSESSMENT — PATIENT HEALTH QUESTIONNAIRE - PHQ9
SUM OF ALL RESPONSES TO PHQ QUESTIONS 1-9: 4
10. IF YOU CHECKED OFF ANY PROBLEMS, HOW DIFFICULT HAVE THESE PROBLEMS MADE IT FOR YOU TO DO YOUR WORK, TAKE CARE OF THINGS AT HOME, OR GET ALONG WITH OTHER PEOPLE: SOMEWHAT DIFFICULT
SUM OF ALL RESPONSES TO PHQ QUESTIONS 1-9: 4

## 2023-12-11 ASSESSMENT — PAIN SCALES - GENERAL: PAINLEVEL: NO PAIN (0)

## 2023-12-11 ASSESSMENT — ANXIETY QUESTIONNAIRES
4. TROUBLE RELAXING: NOT AT ALL
6. BECOMING EASILY ANNOYED OR IRRITABLE: NOT AT ALL
GAD7 TOTAL SCORE: 3
2. NOT BEING ABLE TO STOP OR CONTROL WORRYING: NOT AT ALL
GAD7 TOTAL SCORE: 3
IF YOU CHECKED OFF ANY PROBLEMS ON THIS QUESTIONNAIRE, HOW DIFFICULT HAVE THESE PROBLEMS MADE IT FOR YOU TO DO YOUR WORK, TAKE CARE OF THINGS AT HOME, OR GET ALONG WITH OTHER PEOPLE: SOMEWHAT DIFFICULT
3. WORRYING TOO MUCH ABOUT DIFFERENT THINGS: SEVERAL DAYS
1. FEELING NERVOUS, ANXIOUS, OR ON EDGE: SEVERAL DAYS
5. BEING SO RESTLESS THAT IT IS HARD TO SIT STILL: SEVERAL DAYS
7. FEELING AFRAID AS IF SOMETHING AWFUL MIGHT HAPPEN: NOT AT ALL

## 2023-12-11 NOTE — LETTER
Opioid / Opioid Plus Controlled Substance Agreement    This is an agreement between you and your provider about the safe and appropriate use of controlled substance/opioids prescribed by your care team. Controlled substances are medicines that can cause physical and mental dependence (abuse).    There are strict laws about having and using these medicines. We here at [facility] are committing to working with you in your efforts to get better. To support you in this work, we ll help you schedule regular office appointments for medicine refills. If we must cancel or change your appointment for any reason, we ll make sure you have enough medicine to last until your next appointment.     As a Provider, I will:  Listen carefully to your concerns and treat you with respect.   Recommend a treatment plan that I believe is in your best interest. This plan may involve therapies other than opioid pain medication.   Talk with you often about the possible benefits, and the risk of harm of any medicine that we prescribe for you.   Provide a plan on how to taper (discontinue or go off) using this medicine if the decision is made to stop its use.    As a Patient, I understand that opioid(s):   Are a controlled substance prescribed by my care team to help me function or work and manage my condition(s).   Are strong medicines and can cause serious side effects such as:  Drowsiness, which can seriously affect my driving ability  A lower breathing rate, enough to cause death  Harm to my thinking ability   Depression   Abuse of and addiction to this medicine  Need to be taken exactly as prescribed. Combining opioids with certain medicines or chemicals (such as illegal drugs, sedatives, sleeping pills, and benzodiazepines) can be dangerous or even fatal. If I stop opioids suddenly, I may have severe withdrawal symptoms.  Do not work for all types of pain nor for all patients. If they re not helpful, I may be asked to stop  them.    {Benzo / Stimulant (Optional):112412}    The risks, benefits and side effects of these medicine(s) were explained to me. I agree that:  I will take part in other treatments as advised by my care team. This may be psychiatry or counseling, physical therapy, behavioral therapy, group treatment or a referral to a specialist.     I will keep all my appointments. I understand that this is part of the monitoring of opioids. My care team may require an office visit for EVERY opioid/controlled substance refill. If I miss appointments or don t follow instructions, my care team may stop my medicine.    I will take my medicines as prescribed. I will not change the dose or schedule unless my care team tells me to. There will be no refills if I run out early.     I may be asked to come to the clinic and complete a urine drug test or complete a pill count at any time. If I don t give a urine sample or participate in a pill count, the care team may stop my medicine.    I will only receive prescriptions from this clinic for chronic pain. If I am treated by another provider for acute pain issues, I will tell them that I am taking opioid pain medication for chronic pain and that I have a treatment agreement with this provider. I will inform my [facility] care team within one business day if I am given a prescription for any pain medication by another healthcare provider. My [facility] care team can contact other providers and pharmacists about my use of any medicines.    It is up to me to make sure that I don t run out of my medicines on weekends or holidays. If my care team is willing to refill my opioid prescription without a visit, I must request refills only during office hours. Refills may take up to 3 business days to process. I will use one pharmacy to fill all my opioid and other controlled substance prescriptions. I will notify the clinic about any changes to my insurance or medication  availability.    I am responsible for my prescriptions. If the medicine/prescription is lost, stolen or destroyed, it will not be replaced. I also agree not to share controlled substance medicines with anyone.    I am aware I should not use any illegal or recreational drugs. I agree not to drink alcohol unless my care team says I can.       If I enroll in the [place] [facility] Cannabis program, I will tell my care team prior to my next refill.     I will tell my care team right away if I become pregnant, have a new medical problem treated outside of my regular clinic, or have a change in my medications.    I understand that this medicine can affect my thinking, judgment and reaction time. Alcohol and drugs affect the brain and body, which can affect the safety of my driving. Being under the influence of alcohol or drugs can affect my decision-making, behaviors, personal safety, and the safety of others. Driving while impaired (DWI) can occur if a person is driving, operating, or in physical control of a car, motorcycle, boat, snowmobile, ATV, motorbike, off-road vehicle, or any other motor vehicle ([place] Statute 169A.20). I understand the risk if I choose to drive or operate any vehicle or machinery.    I understand that if I do not follow any of the conditions above, my prescriptions or treatment may be stopped or changed.          Opioids  What You Need to Know    What are opioids?   Opioids are pain medicines that must be prescribed by a doctor. They are also known as narcotics.     Examples are:   morphine (MS Contin, Kailey)  oxycodone (Oxycontin)  oxycodone and acetaminophen (Percocet)  hydrocodone and acetaminophen (Vicodin, Norco)   fentanyl patch (Duragesic)   hydromorphone (Dilaudid)   methadone  codeine (Tylenol #3)     What do opioids do well?   Opioids are best for severe short-term pain such as after a surgery or injury. They may work well for cancer pain. They may help some people with long-lasting  (chronic) pain.     What do opioids NOT do well?   Opioids never get rid of pain entirely, and they don t work well for most patients with chronic pain. Opioids don t reduce swelling, one of the causes of pain.                                    Other ways to manage chronic pain and improve function include:     Treat the health problem that may be causing pain  Anti-inflammation medicines, which reduce swelling and tenderness, such as ibuprofen (Advil, Motrin) or naproxen (Aleve)  Acetaminophen (Tylenol)  Antidepressants and anti-seizure medicines, especially for nerve pain  Topical treatments such as patches or creams  Injections or nerve blocks  Chiropractic or osteopathic treatment  Acupuncture, massage, deep breathing, meditation, visual imagery, aromatherapy  Use heat or ice at the pain site  Physical therapy   Exercise  Stop smoking  Take part in therapy       Risks and side effects     Talk to your doctor before you start or decide to keep taking opioids. Possible side effects include:    Lowering your breathing rate enough to cause death  Overdose, including death, especially if taking higher than prescribed doses  Worse depression symptoms; less pleasure in things you usually enjoy  Feeling tired or sluggish  Slower thoughts or cloudy thinking  Being more sensitive to pain over time; pain is harder to control  Trouble sleeping or restless sleep  Changes in hormone levels (for example, less testosterone)  Changes in sex drive or ability to have sex  Constipation  Unsafe driving  Itching and sweating  Dizziness  Nausea, throwing up and dry mouth    What else should I know about opioids?    Opioids may lead to dependence, tolerance, or addiction.    Dependence means that if you stop or reduce the medicine too quickly, you will have withdrawal symptoms. These include loose poop (diarrhea), jitters, flu-like symptoms, nervousness and tremors. Dependence is not the same as addiction.                      Tolerance means needing higher doses over time to get the same effect. This may increase the chance of serious side effects.    Addiction is when people improperly use a substance that harms their body, their mind or their relations with others. Use of opiates can cause a relapse of addiction if you have a history of drug or alcohol abuse.    People who have used opioids for a long time may have a lower quality of life, worse depression, higher levels of pain and more visits to doctors.    You can overdose on opioids. Take these steps to lower your risk of overdose:    Recognize the signs:  Signs of overdose include decrease or loss of consciousness (blackout), slowed breathing, trouble waking up and blue lips. If someone is worried about overdose, they should call 911.    Talk to your doctor about Narcan (naloxone).   If you are at risk for overdose, you may be given a prescription for Narcan. This medicine very quickly reverses the effects of opioids.   If you overdose, a friend or family member can give you Narcan while waiting for the ambulance. They need to know the signs of overdose and how to give Narcan.     Don't use alcohol or street drugs.   Taking them with opioids can cause death.    Do not take any of these medicines unless your doctor says it s OK. Taking these with opioids can cause death:  Benzodiazepines, such as lorazepam (Ativan), alprazolam (Xanax) or diazepam (Valium)  Muscle relaxers, such as cyclobenzaprine (Flexeril)  Sleeping pills like zolpidem (Ambien)   Other opioids      How to keep you and other people safe while taking opioids:    Never share your opioids with others.  Opioid medicines are regulated by the Drug Enforcement Agency (LEE). Selling or sharing medications is a criminal act.    2. Be sure to store opioids in a secure place, locked up if possible. Young children can easily swallow them and overdose.    3. When you are traveling with your medicines, keep them in the  original bottles. If you use a pill box, be sure you also carry a copy of your medicine list from your clinic or pharmacy.    4. Safe disposal of opioids    Most pharmacies have places to get rid of medicine, called disposal kiosks. Medicine disposal options are also available in every [facility]. Search your county and  medication disposal  to find more options. You can find more details at:  https://www.pca.[facility].[place]./living-green/managing-unwanted-medications     I agree that my provider, clinic care team, and pharmacy may work with any city, state or federal law enforcement agency that investigates the misuse, sale, or other diversion of my controlled medicine. I will allow my provider to discuss my care with, or share a copy of, this agreement with any other treating provider, pharmacy or emergency room where I receive care.    I have read this agreement and have asked questions about anything I did not understand.    _______________________________________________________  Patient Signature - Radha Ruiz _____________________                   Date     _______________________________________________________  Provider Signature - TRAVIS TREVINO MD   _____________________                   Date     _______________________________________________________  Witness Signature (required if provider not present while patient signing)   _____________________                   Date

## 2023-12-11 NOTE — NURSING NOTE
"Chief Complaint   Patient presents with    Recheck Medication     Refill/Discussion         Initial /80 (BP Location: Right arm, Patient Position: Sitting, Cuff Size: Adult Regular)   Pulse 81   Temp 98  F (36.7  C) (Temporal)   Resp 14   Ht 1.53 m (5' 0.25\")   Wt 78.1 kg (172 lb 3.2 oz)   LMP  (LMP Unknown)   SpO2 98%   Breastfeeding No   BMI 33.35 kg/m   Estimated body mass index is 33.35 kg/m  as calculated from the following:    Height as of this encounter: 1.53 m (5' 0.25\").    Weight as of this encounter: 78.1 kg (172 lb 3.2 oz).    Medication Review: complete    The next two questions are to help us understand your food security.  If you are feeling you need any assistance in this area, we have resources available to support you today.          12/11/2023   SDOH- Food Insecurity   Within the past 12 months, did you worry that your food would run out before you got money to buy more? N   Within the past 12 months, did the food you bought just not last and you didn t have money to get more? N       Health Care Directive:  Patient does not have a Health Care Directive or Living Will: Discussed advance care planning with patient; however, patient declined at this time.    Sarah Andrade LPN      "

## 2023-12-11 NOTE — PROGRESS NOTES
"    Assessment & Plan       ICD-10-CM    1. Attention and concentration deficit  R41.840 amphetamine-dextroamphetamine (ADDERALL) 10 MG tablet      2. Nicotine use disorder  F17.200       3. Anxiety state  F41.1 FLUoxetine (PROZAC) 40 MG capsule      4. History of nasal surgery  Z98.890           Adderall 10 mg 3 times daily is refilled today after review of .  We also reviewed medication safeguarding, side effects, risks.  Also reviewed, she has not had a toxicology screen done, order placed.  Increase fluoxetine to 40 mg a day.  Patient has cut down on smoking this past month, likely she was smoking more related to untreated mental health conditions.   Medication list updated with changes following ENT appointment.    PDMP Review         Value Time User    State PDMP site checked  Yes 12/11/2023 10:51 AM Kari Dietz MD                     Return in about 4 weeks (around 1/8/2024).    KARI TREVINO MD  [facility]    Subjective   Radha Ruiz is a 48 year old female  presenting for the following health issues: Nursing Notes:   Sarah Andrade LPN  12/11/2023 10:06 AM  Signed  Chief Complaint   Patient presents with    Recheck Medication     Refill/Discussion         Initial /80 (BP Location: Right arm, Patient Position: Sitting, Cuff Size: Adult Regular)   Pulse 81   Temp 98  F (36.7  C) (Temporal)   Resp 14   Ht 1.53 m (5' 0.25\")   Wt 78.1 kg (172 lb 3.2 oz)   LMP  (LMP Unknown)   SpO2 98%   Breastfeeding No   BMI 33.35 kg/m   Estimated body mass index is 33.35 kg/m  as calculated from the following:    Height as of this encounter: 1.53 m (5' 0.25\").    Weight as of this encounter: 78.1 kg (172 lb 3.2 oz).    Medication Review: complete    The next two questions are to help us understand your food security.  If you are feeling you need any assistance in this area, we have resources available to support you today.          12/11/2023   SDOH- " Food Insecurity   Within the past 12 months, did you worry that your food would run out before you got money to buy more? N   Within the past 12 months, did the food you bought just not last and you didn t have money to get more? N       Health Care Directive:  Patient does not have a Health Care Directive or Living Will: Discussed advance care planning with patient; however, patient declined at this time.    Sarah Andrade, LPN                                 HPI Radha Ruiz is a 48 year old female presents for medication follow up for anxiety and adhd.      She had been on Effexor for 15-20 years.    Currently on Prozac and adderall.  Adderall is new x1 month.    She said before this she had fidgeting, interupting (history of job loss).  Fewer racing thoughts.  Once she starts doing something she is able to finish.    She thinks her Prozac needs to be increased - letting things go.  History of her dad dying, preparing for her son to leave.    Went occupational therapy [facility] - they don't do Horn for ADHD.    Her initial prescription was for a month.      Answers submitted by the patient for this visit:  Patient Health Questionnaire (Submitted on 12/11/2023)  If you checked off any problems, how difficult have these problems made it for you to do your work, take care of things at home, or get along with other people?: Somewhat difficult  PHQ9 TOTAL SCORE: 4  ERMELINDA-7 (Submitted on 12/11/2023)  ERMELINDA 7 TOTAL SCORE: 3  Depression / Anxiety Questionnaire (Submitted on 12/11/2023)  Chief Complaint: Chronic problems general questions HPI Form  Depression/Anxiety: Anxiety  Anxiety only (Submitted on 12/11/2023)  Chief Complaint: Chronic problems general questions HPI Form  Anxiety since last: : medium  Other associated symotome: : Yes  Significant life event: : health concerns  Anxious:: Yes  Current substance use:: No  General Questionnaire (Submitted on 12/11/2023)  Chief Complaint: Chronic problems general  questions HPI Form  How many servings of fruits and vegetables do you eat daily?: 2-3  On average, how many sweetened beverages do you drink each day (Examples: soda, juice, sweet tea, etc.  Do NOT count diet or artificially sweetened beverages)?: 0  How many minutes a day do you exercise enough to make your heart beat faster?: 30 to 60  How many days a week do you exercise enough to make your heart beat faster?: 6  How many days per week do you miss taking your medication?: 0      Current Outpatient Medications   Medication    amphetamine-dextroamphetamine (ADDERALL) 10 MG tablet    beclomethasone (QNASL) 80 MCG/ACT nasal aerosol    budesonide (PULMICORT) 0.5 MG/2ML neb solution    FLUoxetine (PROZAC) 40 MG capsule    fluticasone (FLONASE) 50 MCG/ACT nasal spray    gabapentin (NEURONTIN) 300 MG capsule    linaclotide (LINZESS) 72 MCG capsule    methylPREDNISolone (MEDROL DOSEPAK) 4 MG tablet therapy pack    traZODone (DESYREL) 100 MG tablet     No current facility-administered medications for this visit.     Past Medical History:   Diagnosis Date    Encounter for gynecological examination without abnormal finding     08,Satisfactory GYN examination    Other pulmonary embolism without acute cor pulmonale (H)     History of pulmonary emboli after     Personal history of other medical treatment (CODE)      2, para 1-0-1-1    Personal history of other medical treatment (CODE)     10/00,History of blood transfusion with              Review of Systems   Nasal surgery last year - done with [facility]; recently saw Dr [facility] and is finishing prednisone and is off of zyrtecD, told may not need additional surgery ; using neti medGel and moisturizer         10/4/2023     3:21 PM 2023     9:56 AM 2023     9:53 AM   PHQ   PHQ-9 Total Score 7 7 4   Q9: Thoughts of better off dead/self-harm past 2 weeks Not at all Not at all Not at all         2022     3:46 PM  "11/1/2023     9:57 AM 12/11/2023     9:59 AM   ERMELINDA-7 SCORE   Total Score 5 (mild anxiety) 8 (mild anxiety) 3 (minimal anxiety)   Total Score 5 8 3             Objective  /80 (BP Location: Right arm, Patient Position: Sitting, Cuff Size: Adult Regular)   Pulse 81   Temp 98  F (36.7  C) (Temporal)   Resp 14   Ht 1.53 m (5' 0.25\")   Wt 78.1 kg (172 lb 3.2 oz)   LMP  (LMP Unknown)   SpO2 98%   Breastfeeding No   BMI 33.35 kg/m     Physical Exam   GENERAL: healthy, alert and no distress                "

## 2023-12-11 NOTE — PATIENT INSTRUCTIONS
You are currently being prescribed a controlled substance.  You need to be aware of the risks of taking this medication.     Any medical treatment is initially a trial, and that continued prescribing is based on evidence of benefit without an unacceptable risk. Understand that the goal of using stimulant medications is to increase functional level. If your function does not significantly increase, the medication should and will be stopped.    Be aware that the use of such medicine has certain risks associated with it, including, but not limited to:  Insomnia, racing heart rate and palpitations, tremors, dry eyes, dry mouth, jameson, mood irritability, sexual dysfunction, physical dependence, tolerance to analgesia,addiction, withdrawal and the possibility that the medicine will not provide complete relief.  Usage is associated with a risk of unintended injuries.    This medication will be strictly monitored and all of your medications should be filled at the same pharmacy.  (Should the need arise to change pharmacies our office must be informed).    It is your responsibility to share that you are on a medication contract with your other health care providers, including your dentist.

## 2023-12-11 NOTE — TELEPHONE ENCOUNTER
Patient called and she verified her last name and . She states [facility] is unable to fill her Adderall, as they have been out of stock for the last 2 weeks. She would like this sent to [phone] instead. Jayla Noland RN .............. 2023  11:18 AM

## 2023-12-13 RX ORDER — VENLAFAXINE HYDROCHLORIDE 75 MG/1
75 CAPSULE, EXTENDED RELEASE ORAL DAILY
OUTPATIENT
Start: 2023-12-13

## 2023-12-13 NOTE — TELEPHONE ENCOUNTER
[facility] sent Rx request for the following:      Requested Prescriptions   Pending Prescriptions Disp Refills    venlafaxine (EFFEXOR XR) 75 MG 24 hr capsule [Pharmacy Med Name: VENLAFAXINE ER 75MG CAPSULES] 90 capsule 11     Sig: TAKE 1 CAPSULE BY MOUTH DAILY ALONG WITH  MG CAPSULE FOR TOTAL  MG DAILY       Serotonin-Norepinephrine Reuptake Inhibitors  Failed - 12/10/2023  4:03 AM        Failed - PHQ-9 score of less than 5 in past 6 months     Please review last PHQ-9 score.           Failed - Medication is active on med list          Last Prescription Date:   10/26/23   Last seen:11/1/23  Next Appointment: 1/8/24    Patient medication discontinued on 11/1/23. RX denied.    Blanquita Hanks RN on 12/13/2023 at 10:04 AM

## 2024-01-08 ENCOUNTER — OFFICE VISIT (OUTPATIENT)
Dept: FAMILY MEDICINE | Facility: OTHER | Age: 49
End: 2024-01-08
Attending: FAMILY MEDICINE
Payer: COMMERCIAL

## 2024-01-08 VITALS
RESPIRATION RATE: 16 BRPM | HEART RATE: 83 BPM | TEMPERATURE: 97.3 F | BODY MASS INDEX: 33.12 KG/M2 | WEIGHT: 171 LBS | SYSTOLIC BLOOD PRESSURE: 104 MMHG | OXYGEN SATURATION: 97 % | DIASTOLIC BLOOD PRESSURE: 78 MMHG

## 2024-01-08 DIAGNOSIS — R41.840 ATTENTION AND CONCENTRATION DEFICIT: Primary | ICD-10-CM

## 2024-01-08 DIAGNOSIS — M79.2 NERVE PAIN: ICD-10-CM

## 2024-01-08 DIAGNOSIS — F41.1 ANXIETY STATE: ICD-10-CM

## 2024-01-08 DIAGNOSIS — F33.41 DEPRESSION, MAJOR, RECURRENT, IN PARTIAL REMISSION (H): ICD-10-CM

## 2024-01-08 DIAGNOSIS — J30.9 ALLERGIC RHINITIS, UNSPECIFIED SEASONALITY, UNSPECIFIED TRIGGER: ICD-10-CM

## 2024-01-08 PROCEDURE — 99214 OFFICE O/P EST MOD 30 MIN: CPT | Performed by: FAMILY MEDICINE

## 2024-01-08 RX ORDER — DEXTROAMPHETAMINE SACCHARATE, AMPHETAMINE ASPARTATE, DEXTROAMPHETAMINE SULFATE AND AMPHETAMINE SULFATE 3.75; 3.75; 3.75; 3.75 MG/1; MG/1; MG/1; MG/1
15 TABLET ORAL 3 TIMES DAILY
Qty: 90 TABLET | Refills: 0 | Status: SHIPPED | OUTPATIENT
Start: 2024-02-08 | End: 2024-03-06

## 2024-01-08 RX ORDER — FAMOTIDINE 20 MG
10000 TABLET ORAL DAILY
COMMUNITY
Start: 2024-01-08

## 2024-01-08 RX ORDER — GABAPENTIN 300 MG/1
300 CAPSULE ORAL 3 TIMES DAILY
Qty: 90 CAPSULE | Refills: 11 | Status: SHIPPED | OUTPATIENT
Start: 2024-01-08

## 2024-01-08 RX ORDER — DEXTROAMPHETAMINE SACCHARATE, AMPHETAMINE ASPARTATE, DEXTROAMPHETAMINE SULFATE AND AMPHETAMINE SULFATE 3.75; 3.75; 3.75; 3.75 MG/1; MG/1; MG/1; MG/1
15 TABLET ORAL 3 TIMES DAILY
Qty: 90 TABLET | Refills: 0 | Status: SHIPPED | OUTPATIENT
Start: 2024-01-08 | End: 2024-02-07

## 2024-01-08 RX ORDER — DEXTROAMPHETAMINE SACCHARATE, AMPHETAMINE ASPARTATE, DEXTROAMPHETAMINE SULFATE AND AMPHETAMINE SULFATE 3.75; 3.75; 3.75; 3.75 MG/1; MG/1; MG/1; MG/1
15 TABLET ORAL 3 TIMES DAILY
Qty: 90 TABLET | Refills: 0 | Status: SHIPPED | OUTPATIENT
Start: 2024-03-10 | End: 2024-04-15 | Stop reason: DRUGHIGH

## 2024-01-08 ASSESSMENT — ANXIETY QUESTIONNAIRES
GAD7 TOTAL SCORE: 2
7. FEELING AFRAID AS IF SOMETHING AWFUL MIGHT HAPPEN: NOT AT ALL
5. BEING SO RESTLESS THAT IT IS HARD TO SIT STILL: NOT AT ALL
6. BECOMING EASILY ANNOYED OR IRRITABLE: NOT AT ALL
IF YOU CHECKED OFF ANY PROBLEMS ON THIS QUESTIONNAIRE, HOW DIFFICULT HAVE THESE PROBLEMS MADE IT FOR YOU TO DO YOUR WORK, TAKE CARE OF THINGS AT HOME, OR GET ALONG WITH OTHER PEOPLE: NOT DIFFICULT AT ALL
4. TROUBLE RELAXING: NOT AT ALL
7. FEELING AFRAID AS IF SOMETHING AWFUL MIGHT HAPPEN: NOT AT ALL
3. WORRYING TOO MUCH ABOUT DIFFERENT THINGS: NOT AT ALL
2. NOT BEING ABLE TO STOP OR CONTROL WORRYING: SEVERAL DAYS
1. FEELING NERVOUS, ANXIOUS, OR ON EDGE: SEVERAL DAYS
8. IF YOU CHECKED OFF ANY PROBLEMS, HOW DIFFICULT HAVE THESE MADE IT FOR YOU TO DO YOUR WORK, TAKE CARE OF THINGS AT HOME, OR GET ALONG WITH OTHER PEOPLE?: NOT DIFFICULT AT ALL
GAD7 TOTAL SCORE: 2
GAD7 TOTAL SCORE: 2

## 2024-01-08 ASSESSMENT — PATIENT HEALTH QUESTIONNAIRE - PHQ9
SUM OF ALL RESPONSES TO PHQ QUESTIONS 1-9: 3
10. IF YOU CHECKED OFF ANY PROBLEMS, HOW DIFFICULT HAVE THESE PROBLEMS MADE IT FOR YOU TO DO YOUR WORK, TAKE CARE OF THINGS AT HOME, OR GET ALONG WITH OTHER PEOPLE: NOT DIFFICULT AT ALL
SUM OF ALL RESPONSES TO PHQ QUESTIONS 1-9: 3

## 2024-01-08 ASSESSMENT — PAIN SCALES - GENERAL: PAINLEVEL: SEVERE PAIN (6)

## 2024-01-08 NOTE — NURSING NOTE
"Chief Complaint   Patient presents with    Recheck Medication       Initial /78   Pulse 83   Temp 97.3  F (36.3  C) (Temporal)   Resp 16   Wt 77.6 kg (171 lb)   LMP  (LMP Unknown)   SpO2 97%   Breastfeeding No   BMI 33.12 kg/m   Estimated body mass index is 33.12 kg/m  as calculated from the following:    Height as of 12/11/23: 1.53 m (5' 0.25\").    Weight as of this encounter: 77.6 kg (171 lb).  Medication Review: complete    The next two questions are to help us understand your food security.  If you are feeling you need any assistance in this area, we have resources available to support you today.          1/8/2024   SDOH- Food Insecurity   Within the past 12 months, did you worry that your food would run out before you got money to buy more? N   Within the past 12 months, did the food you bought just not last and you didn t have money to get more? N         Health Care Directive:  Patient does not have a Health Care Directive or Living Will: Discussed advance care planning with patient; however, patient declined at this time.    Leydi Smart LPN      "

## 2024-01-08 NOTE — PROGRESS NOTES
"Nursing Notes:   Leydi Smart LPN  1/8/2024 11:12 AM  Signed  Chief Complaint   Patient presents with    Recheck Medication       Initial /78   Pulse 83   Temp 97.3  F (36.3  C) (Temporal)   Resp 16   Wt 77.6 kg (171 lb)   LMP  (LMP Unknown)   SpO2 97%   Breastfeeding No   BMI 33.12 kg/m   Estimated body mass index is 33.12 kg/m  as calculated from the following:    Height as of 12/11/23: 1.53 m (5' 0.25\").    Weight as of this encounter: 77.6 kg (171 lb).  Medication Review: complete    The next two questions are to help us understand your food security.  If you are feeling you need any assistance in this area, we have resources available to support you today.          1/8/2024   SDOH- Food Insecurity   Within the past 12 months, did you worry that your food would run out before you got money to buy more? N   Within the past 12 months, did the food you bought just not last and you didn t have money to get more? N         Health Care Directive:  Patient does not have a Health Care Directive or Living Will: Discussed advance care planning with patient; however, patient declined at this time.    Leydi Smart LPN      SUBJECTIVE:  Radha Ruiz  is a 48 year old female who comes in today for follow-up of Adderall trial.  She saw Dr. Mason Merino a month ago.  She was changed from Effexor to Prozac.  She started on 20 mg and was increased to 40 mg.  She continues on 10 mg of Adderall 3 times daily..    She feels she is more organized and has lists and expense reports. She sleeps better if she takes the Adderall later in the day.  She feels that the 40 mg of Prozac.  She does have some SAD        11/1/2023     9:56 AM 12/11/2023     9:53 AM 1/8/2024    10:43 AM   PHQ   PHQ-9 Total Score 7 4 3   Q9: Thoughts of better off dead/self-harm past 2 weeks Not at all Not at all Not at all         11/1/2023     9:57 AM 12/11/2023     9:59 AM 1/8/2024    10:47 AM   ERMELINDA-7 SCORE   Total Score 8 (mild " anxiety) 3 (minimal anxiety) 2 (minimal anxiety)   Total Score 8 3 2     She has an upcoming counseling appointment.     Her mom came up for Xmas and that went well.     She had an ENT appointment for an opinion about her nasal symptoms. They didn't have a lot to offer.  She would like to see an allergist. She is using a nasal moisturizer and nasal gel instead of a Netti pot.  She is using Flonase.       Past Medical, Family, and Social History reviewed and updated as noted below.   ROS is negative except as noted above       Allergies   Allergen Reactions    Sulfa Antibiotics Hives   ,   Family History   Problem Relation Age of Onset    Colon Cancer Father 38        Cancer-colon,Colon cancer    Other - See Comments Mother         Psychiatric illness,Untreated anxiety/D&C for postmenopausal bleeding benign    Cancer Maternal Grandfather         Cancer,Brain    Heart Disease Maternal Grandfather         Heart Disease,MI    Hypertension Maternal Grandfather         Hypertension    Other - See Comments Maternal Uncle         Hemochromatosis    Hypertension Maternal Grandmother         Hypertension    Other - See Comments Maternal Grandmother         Alzheimer's    Diabetes Other         Diabetes,Diabetes    Heart Disease Paternal Grandmother         Heart Disease,CHF    Cancer Maternal Uncle         Cancer,cancer all over.    Cancer Paternal Uncle         Cancer,Lung cancer    Breast Cancer No family hx of         Cancer-breast   ,   Current Outpatient Medications   Medication    amphetamine-dextroamphetamine (ADDERALL) 10 MG tablet    amphetamine-dextroamphetamine (ADDERALL) 15 MG tablet    [START ON 2/8/2024] amphetamine-dextroamphetamine (ADDERALL) 15 MG tablet    [START ON 3/10/2024] amphetamine-dextroamphetamine (ADDERALL) 15 MG tablet    beclomethasone (QNASL) 80 MCG/ACT nasal aerosol    budesonide (PULMICORT) 0.5 MG/2ML neb solution    FLUoxetine (PROZAC) 40 MG capsule    fluticasone (FLONASE) 50 MCG/ACT nasal  spray    gabapentin (NEURONTIN) 300 MG capsule    linaclotide (LINZESS) 72 MCG capsule    traZODone (DESYREL) 100 MG tablet    Vitamin D, Cholecalciferol, 25 MCG (1000 UT) CAPS     No current facility-administered medications for this visit.   ,   Past Medical History:   Diagnosis Date    Encounter for gynecological examination without abnormal finding     08,Satisfactory GYN examination    Other pulmonary embolism without acute cor pulmonale (H)     History of pulmonary emboli after     Personal history of other medical treatment (CODE)      2, para 1-0-1-1    Personal history of other medical treatment (CODE)     10/00,History of blood transfusion with    ,   Patient Active Problem List    Diagnosis Date Noted    Polyp of ascending colon, unspecified type 10/02/2020     Priority: Medium     Added automatically from request for surgery [id number]      Tobacco abuse 2018     Priority: Medium    Chronic allergic rhinitis due to pollen, unspecified seasonality 2018     Priority: Medium    Anxiety state 2018     Priority: Medium    Constipation 2018     Priority: Medium    Migraine headache 2018     Priority: Medium    Restless leg syndrome 2018     Priority: Medium    Surgical menopause 2015     Priority: Medium    H/O adenomatous polyp of colon 10/06/2014     Priority: Medium    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     ,   Past Surgical History:   Procedure Laterality Date    ant/post colporr w enterocele incl cysturethroscopy  2017    Dr. Mc     SECTION      10/00 /06,History of blood transfusion with     COLONOSCOPY      ,,,F/U 2019    COLONOSCOPY N/A  09/16/2020    Procedure: COLONOSCOPY, WITH POLYPECTOMY AND BIOPSY;  Surgeon: Rachel Acevedo MD;  Location:  OR    COLONOSCOPY N/A 10/12/2020    1 sessile serrated, 1 tubular - f/u 3 years    COLONOSCOPY N/A 10/26/2023    normal exam with iliocolic anastomosis and family history of large polyps, follow up 10/26/28    Cryotherapy of Cervix  1996    LAPAROSCOPIC ASSISTED HYSTERECTOMY VAGINAL, BILATERAL SALPINGO-OOPHORECTOMY, COMBINED  2014    [facility]    LAPAROSCOPIC TUBAL LIGATION      2006    SEPTOPLASTY, TURBINOPLASTY, COMBINED N/A 12/20/2022    Procedure: Septoplasty, Bilateral turbinate reduction, excision left kiersten bullosa;  Surgeon: Laurie Stoddard MD;  Location: [facility] OR    and   Social History     Tobacco Use    Smoking status: Every Day     Packs/day: 0.25     Years: 30.00     Additional pack years: 0.00     Total pack years: 7.50     Types: Cigarettes     Passive exposure: Current    Smokeless tobacco: Never    Tobacco comments:     Cutting back   Substance Use Topics    Alcohol use: Yes     Comment: Alcoholic Drinks/day: vodka 4-5 times a week     OBJECTIVE:  /78   Pulse 83   Temp 97.3  F (36.3  C) (Temporal)   Resp 16   Wt 77.6 kg (171 lb)   LMP  (LMP Unknown)   SpO2 97%   Breastfeeding No   BMI 33.12 kg/m     EXAM:  Alert cooperative, no distress.  Affect is broad range and appropriate.  Nose is clear but detailed exam was not done.  ASSESSMENT/Plan :    Radha was seen today for recheck medication.    Diagnoses and all orders for this visit:    Attention and concentration deficit  -     amphetamine-dextroamphetamine (ADDERALL) 15 MG tablet; Take 1 tablet (15 mg) by mouth 3 times daily for 30 days  -     amphetamine-dextroamphetamine (ADDERALL) 15 MG tablet; Take 1 tablet (15 mg) by mouth 3 times daily for 30 days  -     amphetamine-dextroamphetamine (ADDERALL) 15 MG tablet; Take 1 tablet (15 mg) by mouth 3 times daily for 30 days    Depression, major, recurrent, in  partial remission (H24)    Anxiety state    Allergic rhinitis, unspecified seasonality, unspecified trigger  -     Adult Allergy/Asthma  Referral; Future    Nerve pain  -     gabapentin (NEURONTIN) 300 MG capsule; Take 1 capsule (300 mg) by mouth 3 times daily    Renewed gabapentin.  Discussed referral to allergy and this is sent.    Discussed adjusting her Adderall dose to 15 mg 3 times daily of the immediate release.  She will keep in touch with her progress.    Discussed referral for another ENT opinion but she wishes to hold off at this time.    A total of 35 minutes was spent with the patient, reviewing records, tests, ordering medications, tests or procedures and documenting clinical information in the EHR.       Rashad Adhikari MD          Answers submitted by the patient for this visit:  Patient Health Questionnaire (Submitted on 1/8/2024)  If you checked off any problems, how difficult have these problems made it for you to do your work, take care of things at home, or get along with other people?: Not difficult at all  PHQ9 TOTAL SCORE: 3  ERMELINDA-7 (Submitted on 1/8/2024)  ERMELINDA 7 TOTAL SCORE: 2  Depression / Anxiety Questionnaire (Submitted on 1/8/2024)  Chief Complaint: Chronic problems general questions HPI Form  Depression/Anxiety: Anxiety  Anxiety only (Submitted on 1/8/2024)  Chief Complaint: Chronic problems general questions HPI Form  Anxiety since last: : better  Other associated symotome: : Yes  Significant life event: : No  Anxious:: No  Current substance use:: No  General Questionnaire (Submitted on 1/8/2024)  Chief Complaint: Chronic problems general questions HPI Form  How many servings of fruits and vegetables do you eat daily?: 2-3  On average, how many sweetened beverages do you drink each day (Examples: soda, juice, sweet tea, etc.  Do NOT count diet or artificially sweetened beverages)?: 0  How many minutes a day do you exercise enough to make your heart beat faster?: 30 to 60  How  many days a week do you exercise enough to make your heart beat faster?: 7  How many days per week do you miss taking your medication?: 0

## 2024-01-08 NOTE — PROGRESS NOTES
Subjective   Radha is a 48 year old, presenting for the following health issues:  Recheck Medication      1/8/2024    10:55 AM   Additional Questions   Roomed by Leydi Smart LPN       History of Present Illness       Mental Health Follow-up:  Patient presents to follow-up on Anxiety.    Patient's anxiety since last visit has been:  Better  The patient is having other symptoms associated with anxiety.  Any significant life events: No  Patient is not feeling anxious or having panic attacks.  Patient has no concerns about alcohol or drug use.    She eats 2-3 servings of fruits and vegetables daily.She consumes 0 sweetened beverage(s) daily.She exercises with enough effort to increase her heart rate 30 to 60 minutes per day.  She exercises with enough effort to increase her heart rate 7 days per week.   She is taking medications regularly.                 Review of Systems         Objective    /78   Pulse 83   Temp 97.3  F (36.3  C) (Temporal)   Resp 16   Wt 77.6 kg (171 lb)   LMP  (LMP Unknown)   SpO2 97%   Breastfeeding No   BMI 33.12 kg/m    Body mass index is 33.12 kg/m .  Physical Exam

## 2024-01-29 ENCOUNTER — MYC REFILL (OUTPATIENT)
Dept: FAMILY MEDICINE | Facility: OTHER | Age: 49
End: 2024-01-29

## 2024-01-29 ENCOUNTER — HOSPITAL ENCOUNTER (OUTPATIENT)
Dept: MAMMOGRAPHY | Facility: OTHER | Age: 49
Discharge: HOME | End: 2024-01-29
Attending: FAMILY MEDICINE | Admitting: FAMILY MEDICINE
Payer: COMMERCIAL

## 2024-01-29 DIAGNOSIS — K58.9 IRRITABLE BOWEL SYNDROME, UNSPECIFIED TYPE: ICD-10-CM

## 2024-01-29 DIAGNOSIS — Z12.31 VISIT FOR SCREENING MAMMOGRAM: ICD-10-CM

## 2024-01-29 PROCEDURE — 77063 BREAST TOMOSYNTHESIS BI: CPT

## 2024-01-29 NOTE — TELEPHONE ENCOUNTER
Will route to primary care to review and advise for continuation of medication. Kathy Roblero RN  ....................  1/29/2024   2:56 PM

## 2024-01-29 NOTE — TELEPHONE ENCOUNTER
Is this supposed to go to [facility]?  They typically use [facility].  Rashad Adhikari MD on 1/29/2024 at 4:42 PM

## 2024-02-09 ENCOUNTER — HOSPITAL ENCOUNTER (OUTPATIENT)
Dept: ULTRASOUND IMAGING | Facility: OTHER | Age: 49
Discharge: HOME | End: 2024-02-09
Attending: FAMILY MEDICINE
Payer: COMMERCIAL

## 2024-02-09 ENCOUNTER — HOSPITAL ENCOUNTER (OUTPATIENT)
Dept: MAMMOGRAPHY | Facility: OTHER | Age: 49
Discharge: HOME | End: 2024-02-09
Attending: FAMILY MEDICINE
Payer: COMMERCIAL

## 2024-02-09 DIAGNOSIS — R92.8 ABNORMAL MAMMOGRAM: ICD-10-CM

## 2024-02-09 PROCEDURE — 77061 BREAST TOMOSYNTHESIS UNI: CPT | Mod: RT

## 2024-02-09 PROCEDURE — 76642 ULTRASOUND BREAST LIMITED: CPT | Mod: RT

## 2024-03-06 ENCOUNTER — MYC REFILL (OUTPATIENT)
Dept: FAMILY MEDICINE | Facility: OTHER | Age: 49
End: 2024-03-06
Payer: COMMERCIAL

## 2024-03-06 DIAGNOSIS — R41.840 ATTENTION AND CONCENTRATION DEFICIT: ICD-10-CM

## 2024-03-06 RX ORDER — DEXTROAMPHETAMINE SACCHARATE, AMPHETAMINE ASPARTATE, DEXTROAMPHETAMINE SULFATE AND AMPHETAMINE SULFATE 3.75; 3.75; 3.75; 3.75 MG/1; MG/1; MG/1; MG/1
15 TABLET ORAL 3 TIMES DAILY
Qty: 90 TABLET | Refills: 0 | Status: SHIPPED | OUTPATIENT
Start: 2024-03-06 | End: 2024-04-15 | Stop reason: DRUGHIGH

## 2024-03-06 NOTE — TELEPHONE ENCOUNTER
Patient comment: I will be traveling to [place] March 12th.  I will not have enough left to continue  doses while we are there for 2 weeks.     amphetamine-dextroamphetamine (ADDERALL) 15 MG tablet 90 tablet 0 3/10/2024 4/9/2024 No   Sig - Route: Take 1 tablet (15 mg) by mouth 3 times daily for 30 days - Oral     Can Pt fill early?    Jayla Noland RN .............. 3/6/2024  11:31 AM

## 2024-04-09 ENCOUNTER — MYC REFILL (OUTPATIENT)
Dept: FAMILY MEDICINE | Facility: OTHER | Age: 49
End: 2024-04-09
Payer: COMMERCIAL

## 2024-04-09 DIAGNOSIS — R41.840 ATTENTION AND CONCENTRATION DEFICIT: ICD-10-CM

## 2024-04-09 RX ORDER — DEXTROAMPHETAMINE SACCHARATE, AMPHETAMINE ASPARTATE, DEXTROAMPHETAMINE SULFATE AND AMPHETAMINE SULFATE 2.5; 2.5; 2.5; 2.5 MG/1; MG/1; MG/1; MG/1
10 TABLET ORAL 3 TIMES DAILY
Qty: 90 TABLET | Refills: 0 | Status: CANCELLED | OUTPATIENT
Start: 2024-04-09

## 2024-04-13 NOTE — TELEPHONE ENCOUNTER
amphetamine-dextroamphetamine (ADDERALL) 10 MG tablet         Last Written Prescription Date:  3/6/24  Last Fill Quantity: 30,   # refills: 0  Last Office Visit: 1/8/24  Future Office visit:       Routing refill request to provider for review/approval because:  Drug not on [place] [facility] [facility] or [facility] refill protocol or controlled substance. Unable to complete prescription refill per RNMedication Refill Policy.................... Jyoti Parra RN ....................  4/13/2024   8:06 AM

## 2024-04-15 RX ORDER — DEXTROAMPHETAMINE SACCHARATE, AMPHETAMINE ASPARTATE, DEXTROAMPHETAMINE SULFATE AND AMPHETAMINE SULFATE 2.5; 2.5; 2.5; 2.5 MG/1; MG/1; MG/1; MG/1
10 TABLET ORAL 3 TIMES DAILY
Qty: 90 TABLET | Refills: 0 | Status: SHIPPED | OUTPATIENT
Start: 2024-04-15 | End: 2024-05-15

## 2024-04-15 RX ORDER — DEXTROAMPHETAMINE SACCHARATE, AMPHETAMINE ASPARTATE, DEXTROAMPHETAMINE SULFATE AND AMPHETAMINE SULFATE 2.5; 2.5; 2.5; 2.5 MG/1; MG/1; MG/1; MG/1
10 TABLET ORAL 3 TIMES DAILY
Qty: 90 TABLET | Refills: 0 | Status: SHIPPED | OUTPATIENT
Start: 2024-05-16 | End: 2024-06-15

## 2024-04-15 RX ORDER — DEXTROAMPHETAMINE SACCHARATE, AMPHETAMINE ASPARTATE, DEXTROAMPHETAMINE SULFATE AND AMPHETAMINE SULFATE 2.5; 2.5; 2.5; 2.5 MG/1; MG/1; MG/1; MG/1
10 TABLET ORAL 3 TIMES DAILY
Qty: 90 TABLET | Refills: 0 | Status: SHIPPED | OUTPATIENT
Start: 2024-06-16 | End: 2024-07-05

## 2024-07-05 ENCOUNTER — OFFICE VISIT (OUTPATIENT)
Dept: FAMILY MEDICINE | Facility: OTHER | Age: 49
End: 2024-07-05
Attending: FAMILY MEDICINE
Payer: COMMERCIAL

## 2024-07-05 VITALS
SYSTOLIC BLOOD PRESSURE: 110 MMHG | OXYGEN SATURATION: 100 % | HEIGHT: 60 IN | RESPIRATION RATE: 14 BRPM | BODY MASS INDEX: 34.55 KG/M2 | HEART RATE: 83 BPM | TEMPERATURE: 97.4 F | DIASTOLIC BLOOD PRESSURE: 72 MMHG | WEIGHT: 176 LBS

## 2024-07-05 DIAGNOSIS — R41.840 ATTENTION AND CONCENTRATION DEFICIT: Primary | ICD-10-CM

## 2024-07-05 DIAGNOSIS — F41.1 ANXIETY STATE: ICD-10-CM

## 2024-07-05 DIAGNOSIS — Z63.6 CAREGIVER STRESS: ICD-10-CM

## 2024-07-05 LAB — CREAT UR-MCNC: 89 MG/DL

## 2024-07-05 PROCEDURE — 80346 BENZODIAZEPINES1-12: CPT | Mod: ZL | Performed by: FAMILY MEDICINE

## 2024-07-05 PROCEDURE — 80375 DRUG/SUBSTANCE NOS 1-3: CPT | Mod: ZL | Performed by: FAMILY MEDICINE

## 2024-07-05 PROCEDURE — G2211 COMPLEX E/M VISIT ADD ON: HCPCS | Performed by: FAMILY MEDICINE

## 2024-07-05 PROCEDURE — 99213 OFFICE O/P EST LOW 20 MIN: CPT | Performed by: FAMILY MEDICINE

## 2024-07-05 RX ORDER — LISDEXAMFETAMINE DIMESYLATE 20 MG/1
20 CAPSULE ORAL DAILY
Qty: 30 CAPSULE | Refills: 0 | Status: SHIPPED | OUTPATIENT
Start: 2024-08-04 | End: 2024-08-07

## 2024-07-05 RX ORDER — LISDEXAMFETAMINE DIMESYLATE 20 MG/1
20 CAPSULE ORAL DAILY
Qty: 30 CAPSULE | Refills: 0 | Status: SHIPPED | OUTPATIENT
Start: 2024-09-03 | End: 2024-08-07

## 2024-07-05 RX ORDER — AZELASTINE 1 MG/ML
SPRAY, METERED NASAL
COMMUNITY

## 2024-07-05 RX ORDER — LISDEXAMFETAMINE DIMESYLATE 20 MG/1
20 CAPSULE ORAL DAILY
Qty: 30 CAPSULE | Refills: 0 | Status: SHIPPED | OUTPATIENT
Start: 2024-07-05 | End: 2024-08-04

## 2024-07-05 RX ORDER — DEXTROAMPHETAMINE SACCHARATE, AMPHETAMINE ASPARTATE, DEXTROAMPHETAMINE SULFATE AND AMPHETAMINE SULFATE 2.5; 2.5; 2.5; 2.5 MG/1; MG/1; MG/1; MG/1
10 TABLET ORAL 3 TIMES DAILY
Qty: 90 TABLET | Refills: 0 | Status: CANCELLED | OUTPATIENT
Start: 2024-07-05

## 2024-07-05 RX ORDER — VENLAFAXINE HYDROCHLORIDE 150 MG/1
225 CAPSULE, EXTENDED RELEASE ORAL
COMMUNITY
End: 2024-08-07

## 2024-07-05 RX ORDER — FLUTICASONE PROPIONATE 50 MCG
SPRAY, SUSPENSION (ML) NASAL
COMMUNITY
End: 2024-08-07

## 2024-07-05 SDOH — SOCIAL STABILITY - SOCIAL INSECURITY: DEPENDENT RELATIVE NEEDING CARE AT HOME: Z63.6

## 2024-07-05 ASSESSMENT — ANXIETY QUESTIONNAIRES
IF YOU CHECKED OFF ANY PROBLEMS ON THIS QUESTIONNAIRE, HOW DIFFICULT HAVE THESE PROBLEMS MADE IT FOR YOU TO DO YOUR WORK, TAKE CARE OF THINGS AT HOME, OR GET ALONG WITH OTHER PEOPLE: SOMEWHAT DIFFICULT
6. BECOMING EASILY ANNOYED OR IRRITABLE: SEVERAL DAYS
GAD7 TOTAL SCORE: 8
7. FEELING AFRAID AS IF SOMETHING AWFUL MIGHT HAPPEN: NOT AT ALL
7. FEELING AFRAID AS IF SOMETHING AWFUL MIGHT HAPPEN: NOT AT ALL
GAD7 TOTAL SCORE: 8
5. BEING SO RESTLESS THAT IT IS HARD TO SIT STILL: MORE THAN HALF THE DAYS
1. FEELING NERVOUS, ANXIOUS, OR ON EDGE: SEVERAL DAYS
2. NOT BEING ABLE TO STOP OR CONTROL WORRYING: MORE THAN HALF THE DAYS
4. TROUBLE RELAXING: NOT AT ALL
3. WORRYING TOO MUCH ABOUT DIFFERENT THINGS: MORE THAN HALF THE DAYS
8. IF YOU CHECKED OFF ANY PROBLEMS, HOW DIFFICULT HAVE THESE MADE IT FOR YOU TO DO YOUR WORK, TAKE CARE OF THINGS AT HOME, OR GET ALONG WITH OTHER PEOPLE?: SOMEWHAT DIFFICULT
GAD7 TOTAL SCORE: 8

## 2024-07-05 ASSESSMENT — PATIENT HEALTH QUESTIONNAIRE - PHQ9
10. IF YOU CHECKED OFF ANY PROBLEMS, HOW DIFFICULT HAVE THESE PROBLEMS MADE IT FOR YOU TO DO YOUR WORK, TAKE CARE OF THINGS AT HOME, OR GET ALONG WITH OTHER PEOPLE: SOMEWHAT DIFFICULT
SUM OF ALL RESPONSES TO PHQ QUESTIONS 1-9: 8
SUM OF ALL RESPONSES TO PHQ QUESTIONS 1-9: 8

## 2024-07-05 ASSESSMENT — PAIN SCALES - GENERAL: PAINLEVEL: NO PAIN (0)

## 2024-07-05 NOTE — PATIENT INSTRUCTIONS
You are currently being prescribed a controlled substance.  You need to be aware of the risks of taking this medication.  By signing a medication contract, you accept these risks and side effects.      Any medical treatment is initially a trial, and that continued prescribing is based on evidence of benefit without an unacceptable risk. Understand that the goal of using stimulant medications is to increase functional level. If your function does not significantly increase, the medication should and will be stopped.    Be aware that the use of such medicine has certain risks associated with it, including, but not limited to:  Insomnia, racing heart rate and palpitations, tremors, dry eyes, dry mouth, jameson, mood irritability, sexual dysfunction, physical dependence, tolerance to analgesia,addiction, withdrawal and the possibility that the medicine will not provide complete relief.  Usage is associated with a risk of unintended injuries.    This medication will be strictly monitored and all of your medications should be filled at the same pharmacy.  (Should the need arise to change pharmacies our office must be informed).    It is your responsibility to share that you are on a medication contract with your other health care providers, including your dentist.

## 2024-07-05 NOTE — NURSING NOTE
"Chief Complaint   Patient presents with    Recheck Medication       Initial /72 (BP Location: Right arm, Patient Position: Sitting, Cuff Size: Adult Regular)   Pulse 83   Temp 97.4  F (36.3  C) (Temporal)   Resp 14   Ht 1.53 m (5' 0.25\")   Wt 79.8 kg (176 lb)   LMP  (LMP Unknown)   SpO2 100%   BMI 34.09 kg/m   Estimated body mass index is 34.09 kg/m  as calculated from the following:    Height as of this encounter: 1.53 m (5' 0.25\").    Weight as of this encounter: 79.8 kg (176 lb).    Medication Review: complete    The next two questions are to help us understand your food security.  If you are feeling you need any assistance in this area, we have resources available to support you today.          1/8/2024   SDOH- Food Insecurity   Within the past 12 months, did you worry that your food would run out before you got money to buy more? N   Within the past 12 months, did the food you bought just not last and you didn t have money to get more? N        Health Care Directive:  Patient does not have a Health Care Directive or Living Will: Patient states has Advance Directive and will bring in a copy to clinic.    Sarah Andrade LPN      "

## 2024-07-05 NOTE — PROGRESS NOTES
"    Assessment & Plan       ICD-10-CM    1. Attention and concentration deficit  R41.840 Drug Confirmation Panel Urine with Creatinine     lisdexamfetamine (VYVANSE) 20 MG capsule     lisdexamfetamine (VYVANSE) 20 MG capsule     lisdexamfetamine (VYVANSE) 20 MG capsule      2. Caregiver stress  Z63.6       3. Anxiety state  F41.1           We discussed current medications versus different medication options.  She has also tried nonstimulants/Wellbutrin in the past for her symptoms.  At this point in time, Adderall will be discontinued and she will be switched to Vyvanse 10 mg a day.  Side effects this medication were discussed.  As noted below, she is working with a therapist.  We also discussed caregiver stress and that in fact on ADHD and anxiety symptoms.  Urine screen to be obtained today.  Follow-up in August as scheduled    PDMP Review         Value Time User    State PDMP site checked  Yes 7/5/2024  9:54 AM Kari Dietz MD                   The longitudinal plan of care for emotional health was addressed during this visit. Due to the added complexity in care, I will continue to support Radha Ruiz in the subsequent management of this condition(s) and with the ongoing continuity of care of this condition(s).        BMI  Estimated body mass index is 34.09 kg/m  as calculated from the following:    Height as of this encounter: 1.53 m (5' 0.25\").    Weight as of this encounter: 79.8 kg (176 lb).         Return in about 1 month (around 8/7/2024).    KARI TREVINO MD  [facility]    Subjective   Radha Ruiz is a 48 year old female  presenting for the following health issues: Nursing Notes:   Sarah Andrade LPN  7/5/2024  9:40 AM  Signed  Chief Complaint   Patient presents with    Recheck Medication       Initial /72 (BP Location: Right arm, Patient Position: Sitting, Cuff Size: Adult Regular)   Pulse 83   Temp 97.4  F (36.3  C) (Temporal)   Resp 14  " " Ht 1.53 m (5' 0.25\")   Wt 79.8 kg (176 lb)   LMP  (LMP Unknown)   SpO2 100%   BMI 34.09 kg/m   Estimated body mass index is 34.09 kg/m  as calculated from the following:    Height as of this encounter: 1.53 m (5' 0.25\").    Weight as of this encounter: 79.8 kg (176 lb).    Medication Review: complete    The next two questions are to help us understand your food security.  If you are feeling you need any assistance in this area, we have resources available to support you today.          1/8/2024   SDOH- Food Insecurity   Within the past 12 months, did you worry that your food would run out before you got money to buy more? N   Within the past 12 months, did the food you bought just not last and you didn t have money to get more? N        Health Care Directive:  Patient does not have a Health Care Directive or Living Will: Patient states has Advance Directive and will bring in a copy to clinic.    Sarah Andrade LPN                                 HPI Radha Ruiz is a 48 year old female presents for follow up of ADHD.    She is frequently forgetting to take her medication.  This could be missing it as often as 5 days a week.  When went up to 15 mg of adderall, she was more tired.  In review, fairly consistent with filling her prescriptions.  In addition to Adderall, she has been on other medications in the past for ADHD including Wellbutrin.  She is also on medications for sleep and mood including trazodone, fluoxetine.  She is also working with a therapist on starting tapping therapy.    Caregiver roll with her mom who lives in [place].  She is is driving back there a couple of times a month.  She will stay a few days at a time.      Answers submitted by the patient for this visit:  Patient Health Questionnaire (Submitted on 7/5/2024)  If you checked off any problems, how difficult have these problems made it for you to do your work, take care of things at home, or get along with other people?: Somewhat " difficult  PHQ9 TOTAL SCORE: 8  ERMELINDA-7 (Submitted on 7/5/2024)  ERMELINDA 7 TOTAL SCORE: 8  Depression / Anxiety Questionnaire (Submitted on 7/5/2024)  Chief Complaint: Chronic problems general questions HPI Form  Depression/Anxiety: Anxiety  Anxiety only (Submitted on 7/5/2024)  Chief Complaint: Chronic problems general questions HPI Form  Anxiety since last: : better  Other associated symotome: : Yes  Significant life event: : other  Anxious:: Yes  Current substance use:: No  General Questionnaire (Submitted on 7/5/2024)  Chief Complaint: Chronic problems general questions HPI Form  How many servings of fruits and vegetables do you eat daily?: 2-3  On average, how many sweetened beverages do you drink each day (Examples: soda, juice, sweet tea, etc.  Do NOT count diet or artificially sweetened beverages)?: 1  How many minutes a day do you exercise enough to make your heart beat faster?: 30 to 60  How many days a week do you exercise enough to make your heart beat faster?: 5  How many days per week do you miss taking your medication?: 4    Current Outpatient Medications   Medication Sig Dispense Refill    azelastine (ASTELIN) 0.1 % nasal spray INSTILL 1 SPRAY INTO EACH NOSTRIL TWICE DAILY      beclomethasone (QNASL) 80 MCG/ACT nasal aerosol Spray 2 sprays into both nostrils daily 10.6 g 11    budesonide (PULMICORT) 0.5 MG/2ML neb solution Spray 2 mLs (0.5 mg) in nostril daily MIX CONTENTS OF 1 VIAL INTO 240MLS OF CELIA MED SINUS IRRIGATION AND RINSE EACH NOSTRIL  DAILY      FLUoxetine (PROZAC) 40 MG capsule Take 1 capsule (40 mg) by mouth daily 90 capsule 3    fluticasone (FLONASE) 50 MCG/ACT nasal spray Instill  nasally one time a day.      fluticasone (FLONASE) 50 MCG/ACT nasal spray INHALE 2 SPRAYS INTO BOTH NOSTRILS ONCE DAILY. 48 mL 11    gabapentin (NEURONTIN) 300 MG capsule Take 1 capsule (300 mg) by mouth 3 times daily 90 capsule 11    linaclotide (LINZESS) 72 MCG capsule Take 1 capsule (72 mcg) by mouth once as  "needed 90 capsule 11    lisdexamfetamine (VYVANSE) 20 MG capsule Take 1 capsule (20 mg) by mouth daily for 30 days 30 capsule 0    [START ON 2024] lisdexamfetamine (VYVANSE) 20 MG capsule Take 1 capsule (20 mg) by mouth daily for 30 days 30 capsule 0    [START ON 9/3/2024] lisdexamfetamine (VYVANSE) 20 MG capsule Take 1 capsule (20 mg) by mouth daily for 30 days 30 capsule 0    traZODone (DESYREL) 100 MG tablet TAKE 3-4 TABLETS BY MOUTH AT BEDTIME. (Patient taking differently: 300 mg at bedtime) 360 tablet 11    venlafaxine (EFFEXOR XR) 150 MG 24 hr capsule Take 225 mg by mouth      Vitamin D, Cholecalciferol, 25 MCG (1000 UT) CAPS Take 10,000 Units by mouth daily       No current facility-administered medications for this visit.     Past Medical History:   Diagnosis Date    Encounter for gynecological examination without abnormal finding     08,Satisfactory GYN examination    Other pulmonary embolism without acute cor pulmonale (H)     History of pulmonary emboli after     Personal history of other medical treatment (CODE)      2, para 1-0-1-1    Personal history of other medical treatment (CODE)     10/00,History of blood transfusion with                Review of Systems           2023     9:53 AM 2024    10:43 AM 2024     9:30 AM   PHQ   PHQ-9 Total Score 4 3 8   Q9: Thoughts of better off dead/self-harm past 2 weeks Not at all Not at all Not at all         2023     9:59 AM 2024    10:47 AM 2024     9:36 AM   ERMELINDA-7 SCORE   Total Score 3 (minimal anxiety) 2 (minimal anxiety) 8 (mild anxiety)   Total Score 3 2 8             Objective  /72 (BP Location: Right arm, Patient Position: Sitting, Cuff Size: Adult Regular)   Pulse 83   Temp 97.4  F (36.3  C) (Temporal)   Resp 14   Ht 1.53 m (5' 0.25\")   Wt 79.8 kg (176 lb)   LMP  (LMP Unknown)   SpO2 100%   BMI 34.09 kg/m     Physical Exam   GENERAL: alert and no distress  PSYCH: non-restless " pressured speech

## 2024-07-09 LAB
AMPHET UR CFM-MCNC: 4920 NG/ML
AMPHET/CREAT UR: 5528 NG/MG {CREAT}
CODEINE UR CFM-MCNC: 67 NG/ML
CODEINE/CREAT UR: 75 NG/MG {CREAT}
GABAPENTIN UR QL CFM: PRESENT

## 2024-07-18 DIAGNOSIS — F41.1 ANXIETY STATE: ICD-10-CM

## 2024-07-18 DIAGNOSIS — R41.840 ATTENTION AND CONCENTRATION DEFICIT: Primary | ICD-10-CM

## 2024-07-19 ENCOUNTER — LAB (OUTPATIENT)
Dept: LAB | Facility: OTHER | Age: 49
End: 2024-07-19
Attending: FAMILY MEDICINE
Payer: COMMERCIAL

## 2024-07-19 ENCOUNTER — DOCUMENTATION ONLY (OUTPATIENT)
Dept: FAMILY MEDICINE | Facility: OTHER | Age: 49
End: 2024-07-19

## 2024-07-19 DIAGNOSIS — R41.840 ATTENTION AND CONCENTRATION DEFICIT: ICD-10-CM

## 2024-07-19 DIAGNOSIS — F41.1 ANXIETY STATE: ICD-10-CM

## 2024-07-19 LAB — CREAT UR-MCNC: 16 MG/DL

## 2024-07-19 PROCEDURE — 80353 DRUG SCREENING COCAINE: CPT | Mod: ZL

## 2024-07-19 PROCEDURE — 80321 ALCOHOLS BIOMARKERS 1OR 2: CPT | Mod: ZL

## 2024-07-19 PROCEDURE — 80375 DRUG/SUBSTANCE NOS 1-3: CPT | Mod: ZL

## 2024-07-19 PROCEDURE — 36415 COLL VENOUS BLD VENIPUNCTURE: CPT | Mod: ZL

## 2024-07-19 NOTE — PROGRESS NOTES
Per [facility] patient was to come in today to have her Vyvanse counted after her scheduled lab draw. Vyvanse was counted by the pharmacy [facility]. This writer spoke with Sh[facility] who confirmed that the Vyvanse 20 mg capsules (QTY 30) were filled on 7/19/2024, with a remainder of 15 capsules left as of today.   Jannet Buenrostro RN on 7/19/2024 at 2:01 PM

## 2024-07-23 LAB
AMPHET UR CFM-MCNC: 540 NG/ML
AMPHET/CREAT UR: 3375 NG/MG {CREAT}
GABAPENTIN UR QL CFM: PRESENT
LABORATORY REPORT: NORMAL
PETH INTERPRETATION: NORMAL
PLPETH BLD-MCNC: 16 NG/ML
POPETH BLD-MCNC: 13 NG/ML

## 2024-08-06 ASSESSMENT — PATIENT HEALTH QUESTIONNAIRE - PHQ9
SUM OF ALL RESPONSES TO PHQ QUESTIONS 1-9: 6
10. IF YOU CHECKED OFF ANY PROBLEMS, HOW DIFFICULT HAVE THESE PROBLEMS MADE IT FOR YOU TO DO YOUR WORK, TAKE CARE OF THINGS AT HOME, OR GET ALONG WITH OTHER PEOPLE: SOMEWHAT DIFFICULT
SUM OF ALL RESPONSES TO PHQ QUESTIONS 1-9: 6

## 2024-08-07 ENCOUNTER — OFFICE VISIT (OUTPATIENT)
Dept: FAMILY MEDICINE | Facility: OTHER | Age: 49
End: 2024-08-07
Attending: FAMILY MEDICINE
Payer: COMMERCIAL

## 2024-08-07 VITALS
WEIGHT: 177 LBS | BODY MASS INDEX: 34.75 KG/M2 | HEIGHT: 60 IN | RESPIRATION RATE: 14 BRPM | TEMPERATURE: 97.1 F | DIASTOLIC BLOOD PRESSURE: 84 MMHG | OXYGEN SATURATION: 99 % | HEART RATE: 80 BPM | SYSTOLIC BLOOD PRESSURE: 122 MMHG

## 2024-08-07 DIAGNOSIS — R41.840 ATTENTION AND CONCENTRATION DEFICIT: Primary | ICD-10-CM

## 2024-08-07 DIAGNOSIS — F41.1 ANXIETY STATE: ICD-10-CM

## 2024-08-07 DIAGNOSIS — F33.41 RECURRENT MAJOR DEPRESSION IN PARTIAL REMISSION (H): ICD-10-CM

## 2024-08-07 DIAGNOSIS — Z23 NEED FOR TETANUS BOOSTER: ICD-10-CM

## 2024-08-07 DIAGNOSIS — Z63.6 CAREGIVER STRESS: ICD-10-CM

## 2024-08-07 DIAGNOSIS — Z00.00 ENCOUNTER FOR MEDICAL EXAMINATION TO ESTABLISH CARE: ICD-10-CM

## 2024-08-07 PROCEDURE — 90715 TDAP VACCINE 7 YRS/> IM: CPT | Performed by: FAMILY MEDICINE

## 2024-08-07 PROCEDURE — 99214 OFFICE O/P EST MOD 30 MIN: CPT | Mod: 25 | Performed by: FAMILY MEDICINE

## 2024-08-07 PROCEDURE — 90471 IMMUNIZATION ADMIN: CPT | Performed by: FAMILY MEDICINE

## 2024-08-07 RX ORDER — LISDEXAMFETAMINE DIMESYLATE 40 MG/1
40 CAPSULE ORAL DAILY
Qty: 30 CAPSULE | Refills: 0 | Status: SHIPPED | OUTPATIENT
Start: 2024-08-07 | End: 2024-09-06

## 2024-08-07 RX ORDER — LISDEXAMFETAMINE DIMESYLATE 40 MG/1
40 CAPSULE ORAL DAILY
Qty: 30 CAPSULE | Refills: 0 | Status: SHIPPED | OUTPATIENT
Start: 2024-09-06 | End: 2024-10-06

## 2024-08-07 RX ORDER — TRAZODONE HYDROCHLORIDE 100 MG/1
TABLET ORAL
COMMUNITY
Start: 2024-08-07 | End: 2024-09-04

## 2024-08-07 RX ORDER — LISDEXAMFETAMINE DIMESYLATE 40 MG/1
40 CAPSULE ORAL DAILY
Qty: 30 CAPSULE | Refills: 0 | Status: SHIPPED | OUTPATIENT
Start: 2024-10-06 | End: 2024-11-05

## 2024-08-07 SDOH — SOCIAL STABILITY - SOCIAL INSECURITY: DEPENDENT RELATIVE NEEDING CARE AT HOME: Z63.6

## 2024-08-07 ASSESSMENT — PAIN SCALES - GENERAL: PAINLEVEL: NO PAIN (0)

## 2024-08-07 NOTE — PROGRESS NOTES
"    Assessment & Plan       ICD-10-CM    1. Attention and concentration deficit  R41.840 lisdexamfetamine (VYVANSE) 40 MG capsule     lisdexamfetamine (VYVANSE) 40 MG capsule     lisdexamfetamine (VYVANSE) 40 MG capsule      2. Caregiver stress  Z63.6       3. Anxiety state  F41.1 traZODone (DESYREL) 100 MG tablet      4. Encounter for medical examination to establish care  Z00.00       5. Recurrent major depression in partial remission (H24)  F33.41 traZODone (DESYREL) 100 MG tablet      6. Need for tetanus booster  Z23 GH IMM - TDAP (ADACEL, BOOSTRIX)          At this point, we will change Vyvanse to 40 mg a day.  Potential side effects of this medication are discussed, especially with change in dose.  Discouraged alcohol or other substance use with this.  We reviewed safeguarding of medication.  I do also think depression, anxiety and additional stress are playing into her symptoms and she may benefit from psychiatry/psychology consultation.  Trazodone refilled today  Boostrix updated today  Follow up in 3 months     PDMP Review         Value Time User    State PDMP site checked  Yes 8/7/2024  3:23 PM Kari Dietz MD                   The longitudinal plan of care for mental health was addressed during this visit. Due to the added complexity in care, I will continue to support Radha Ruiz in the subsequent management of this condition(s) and with the ongoing continuity of care of this condition(s).        BMI  Estimated body mass index is 34.28 kg/m  as calculated from the following:    Height as of this encounter: 1.53 m (5' 0.25\").    Weight as of this encounter: 80.3 kg (177 lb).         Return in about 3 months (around 11/7/2024).    KARI TREVINO MD  [facility]    Subjective   Radha Ruiz is a 48 year old female  presenting for the following health issues: Nursing Notes:   Sarah Andrade LPN  8/7/2024  3:11 PM  Signed  Chief Complaint   Patient " "presents with    Recheck Medication     Vyvanse    Establish Care       Initial /84 (BP Location: Right arm, Patient Position: Sitting, Cuff Size: Adult Regular)   Pulse 80   Temp 97.1  F (36.2  C) (Temporal)   Resp 14   Ht 1.53 m (5' 0.25\")   Wt 80.3 kg (177 lb)   LMP  (LMP Unknown)   SpO2 99%   BMI 34.28 kg/m   Estimated body mass index is 34.28 kg/m  as calculated from the following:    Height as of this encounter: 1.53 m (5' 0.25\").    Weight as of this encounter: 80.3 kg (177 lb).    Medication Review: complete    The next two questions are to help us understand your food security.  If you are feeling you need any assistance in this area, we have resources available to support you today.          1/8/2024   SDOH- Food Insecurity   Within the past 12 months, did you worry that your food would run out before you got money to buy more? N   Within the past 12 months, did the food you bought just not last and you didn t have money to get more? N          Health Care Directive:  Patient does not have a Health Care Directive or Living Will: Discussed advance care planning with patient; however, patient declined at this time.    Sarah Andrade LPN                                 HPI Radhavishnu Ruiz is a 48 year old female presents for medication follow up.  At her last visit, she was changed to vyvanse.  She feels better on this but doesn't fee like this is working for her attention symptoms as well.  Sleep isn't as good.    She continues to have a large amount of significant stress related to her mother's health, needing to drive to take care of her.  Following patient's last visit, she was brought back in for a pill count and follow-up labs.  She had coating on her urinalysis.  This was not recorded on her medication list nor .  She denies having taken any prescription cold/cough medicine.  Her follow-up talk screen was appropriate, her pill count was correct.      Answers submitted by the patient " for this visit:  Patient Health Questionnaire (Submitted on 8/6/2024)  If you checked off any problems, how difficult have these problems made it for you to do your work, take care of things at home, or get along with other people?: Somewhat difficult  PHQ9 TOTAL SCORE: 6  General Questionnaire (Submitted on 8/6/2024)  Chief Complaint: Chronic problems general questions HPI Form  What is the reason for your visit today? : New doctor health check  How many servings of fruits and vegetables do you eat daily?: 2-3  On average, how many sweetened beverages do you drink each day (Examples: soda, juice, sweet tea, etc.  Do NOT count diet or artificially sweetened beverages)?: 1  How many minutes a day do you exercise enough to make your heart beat faster?: 30 to 60  How many days a week do you exercise enough to make your heart beat faster?: 5  How many days per week do you miss taking your medication?: 0      Current Outpatient Medications   Medication Sig Dispense Refill    azelastine (ASTELIN) 0.1 % nasal spray INSTILL 1 SPRAY INTO EACH NOSTRIL TWICE DAILY      FLUoxetine (PROZAC) 40 MG capsule Take 1 capsule (40 mg) by mouth daily 90 capsule 3    gabapentin (NEURONTIN) 300 MG capsule Take 1 capsule (300 mg) by mouth 3 times daily 90 capsule 11    linaclotide (LINZESS) 72 MCG capsule Take 1 capsule (72 mcg) by mouth once as needed 90 capsule 11    lisdexamfetamine (VYVANSE) 40 MG capsule Take 1 capsule (40 mg) by mouth daily for 30 days 30 capsule 0    [START ON 9/6/2024] lisdexamfetamine (VYVANSE) 40 MG capsule Take 1 capsule (40 mg) by mouth daily for 30 days 30 capsule 0    [START ON 10/6/2024] lisdexamfetamine (VYVANSE) 40 MG capsule Take 1 capsule (40 mg) by mouth daily for 30 days 30 capsule 0    traZODone (DESYREL) 100 MG tablet TAKE 3-4 TABLETS BY MOUTH AT BEDTIME.      Vitamin D, Cholecalciferol, 25 MCG (1000 UT) CAPS Take 10,000 Units by mouth daily       No current facility-administered medications for this  "visit.     Past Medical History:   Diagnosis Date    Encounter for gynecological examination without abnormal finding     08,Satisfactory GYN examination    Other pulmonary embolism without acute cor pulmonale (H)     History of pulmonary emboli after     Personal history of other medical treatment (CODE)      2, para 1-0-1-1    Personal history of other medical treatment (CODE)     10/00,History of blood transfusion with                Review of Systems           2024    10:43 AM 2024     9:30 AM 2024     4:08 PM   PHQ   PHQ-9 Total Score 3 8 6   Q9: Thoughts of better off dead/self-harm past 2 weeks Not at all Not at all Not at all         2023     9:59 AM 2024    10:47 AM 2024     9:36 AM   ERMELINDA-7 SCORE   Total Score 3 (minimal anxiety) 2 (minimal anxiety) 8 (mild anxiety)   Total Score 3 2 8             Objective  /84 (BP Location: Right arm, Patient Position: Sitting, Cuff Size: Adult Regular)   Pulse 80   Temp 97.1  F (36.2  C) (Temporal)   Resp 14   Ht 1.53 m (5' 0.25\")   Wt 80.3 kg (177 lb)   LMP  (LMP Unknown)   SpO2 99%   BMI 34.28 kg/m     Physical Exam   GENERAL: alert and no distress    Lab on 2024   Component Date Value Ref Range Status    PEth 16:0/18:1 (POPEth) 2024 13  ng/mL Final    PEth 16:0/18:1 (POPEth)  Less than 10 ng/mL............Not detected  Less than 20 ng/mL............Abstinence or light alcohol   consumption  20 - 200 ng/mL................Moderate alcohol consumption  Greater than 200 ng/mL........Heavy alcohol consumption or   chronic alcohol use    (Reference: MYKE Ying and ONDINA Wetzel 2018 J. Forensic Sci)    PEth 16:0/18:2 (PLPEth) 2024 16  ng/mL Final    Reference ranges are not well established.    EER Phosphatidylethanol (PETH) 2024 See Note   Final    Authorized individuals can access the [facility]   Enhanced Report using the following link:    "   [email or web address]    PEth Interpretation 07/19/2024 See Comment   Final    Comment: Phosphatidylethanol (PEth) is a group of phospholipids   formed in the presence of ethanol, phospholipase D and   phosphatidylcholine. PEth is known to be a direct alcohol   biomarker. The predominant PEth homologues are PEth   16:0/18:1 (POPEth) and PEth 16:0/18:2 (PLPEth), which   account for 37-46% and 26-28% of the total PEth homologues,   respectively. PEth is incorporated into the phospholipid   membrane of red blood cells and has a general half-life of   4-10 days and a window of detection of 2-4 weeks. However,   the window of detection is longer in individuals who   chronically or excessively consume alcohol. The limit of   quantification is 10 ng/mL. Serial monitoring of PEth may   be helpful in monitoring alcohol abstinence over time. PEth   results should be interpreted in the context of the   patient's clinical and behavioral history.  Patients with advanced liver disease may have falsely   elevated PEth concentrations (Lizette HOUSE et al 2018,   Alcoholism Clinical &                            Experimental Research).    This test was developed and its performance characteristics   determined by [facility]. It has not been cleared or   approved by the U.S. Food and Drug Administration. This   test was performed in a CLIA-certified laboratory and is   intended for clinical purposes.  Performed By: [facility]  [address] [place] [id number]  : Gabe Delgado MD, PhD  CLIA Number: [id number]    Gabapentin (Neurontin) 07/19/2024 Present (A)  Absent Final    Sources of gabapentin are prescription medications.    Amphetamine ng/mL 07/19/2024 540 (H)  <50 ng/mL Final    Amphetamine 07/19/2024 3,375  Absent ng/mg [creat] Final    Sources of amphetamine include illicit sources, as a scheduled prescription medication, as a metabolite of some  prescription drugs, or use of an l-methamphetamine inhaler.  Amphetamine is an expected metabolite of methamphetamine. Amphetamine is also available as a scheduled prescription drug.    Creatinine Urine for Drug Screen 07/19/2024 16  mg/dL Final    The reference range has not been established for creatinine in random urines. The results should be integrated into the clinical context for interpretation.

## 2024-08-07 NOTE — NURSING NOTE
"Chief Complaint   Patient presents with    Recheck Medication     Vyvanse    Establish Care       Initial /84 (BP Location: Right arm, Patient Position: Sitting, Cuff Size: Adult Regular)   Pulse 80   Temp 97.1  F (36.2  C) (Temporal)   Resp 14   Ht 1.53 m (5' 0.25\")   Wt 80.3 kg (177 lb)   LMP  (LMP Unknown)   SpO2 99%   BMI 34.28 kg/m   Estimated body mass index is 34.28 kg/m  as calculated from the following:    Height as of this encounter: 1.53 m (5' 0.25\").    Weight as of this encounter: 80.3 kg (177 lb).    Medication Review: complete    The next two questions are to help us understand your food security.  If you are feeling you need any assistance in this area, we have resources available to support you today.          1/8/2024   SDOH- Food Insecurity   Within the past 12 months, did you worry that your food would run out before you got money to buy more? N   Within the past 12 months, did the food you bought just not last and you didn t have money to get more? N          Health Care Directive:  Patient does not have a Health Care Directive or Living Will: Discussed advance care planning with patient; however, patient declined at this time.    Sarah Andrade LPN      "
Detail Level: Detailed
Size Of Lesion: 0.3x0.25 cm

## 2024-08-12 ENCOUNTER — HOSPITAL ENCOUNTER (OUTPATIENT)
Dept: MAMMOGRAPHY | Facility: OTHER | Age: 49
Discharge: HOME | End: 2024-08-12
Attending: FAMILY MEDICINE
Payer: COMMERCIAL

## 2024-08-12 ENCOUNTER — HOSPITAL ENCOUNTER (OUTPATIENT)
Dept: ULTRASOUND IMAGING | Facility: OTHER | Age: 49
Discharge: HOME | End: 2024-08-12
Attending: FAMILY MEDICINE
Payer: COMMERCIAL

## 2024-08-12 DIAGNOSIS — R92.8 ABNORMAL FINDING ON BREAST IMAGING: ICD-10-CM

## 2024-08-12 DIAGNOSIS — Z09 FOLLOW-UP EXAM, 3-6 MONTHS SINCE PREVIOUS EXAM: ICD-10-CM

## 2024-08-12 PROCEDURE — 76642 ULTRASOUND BREAST LIMITED: CPT | Mod: RT

## 2024-08-12 PROCEDURE — 77065 DX MAMMO INCL CAD UNI: CPT | Mod: RT

## 2024-08-22 ENCOUNTER — HOSPITAL ENCOUNTER (OUTPATIENT)
Dept: MAMMOGRAPHY | Facility: OTHER | Age: 49
Discharge: HOME | End: 2024-08-22
Attending: FAMILY MEDICINE
Payer: COMMERCIAL

## 2024-08-22 ENCOUNTER — HOSPITAL ENCOUNTER (OUTPATIENT)
Dept: ULTRASOUND IMAGING | Facility: OTHER | Age: 49
Discharge: HOME | End: 2024-08-22
Attending: FAMILY MEDICINE
Payer: COMMERCIAL

## 2024-08-22 DIAGNOSIS — R92.8 ABNORMAL FINDING ON BREAST IMAGING: ICD-10-CM

## 2024-08-22 PROCEDURE — A4648 IMPLANTABLE TISSUE MARKER: HCPCS

## 2024-08-22 PROCEDURE — 88305 TISSUE EXAM BY PATHOLOGIST: CPT

## 2024-08-22 PROCEDURE — 250N000009 HC RX 250: Performed by: STUDENT IN AN ORGANIZED HEALTH CARE EDUCATION/TRAINING PROGRAM

## 2024-08-22 PROCEDURE — 999N000065 MA POST PROCEDURE RIGHT

## 2024-08-22 RX ORDER — LIDOCAINE HYDROCHLORIDE AND EPINEPHRINE 10; 10 MG/ML; UG/ML
10-20 INJECTION, SOLUTION INFILTRATION; PERINEURAL
Status: COMPLETED | OUTPATIENT
Start: 2024-08-22 | End: 2024-08-22

## 2024-08-22 RX ADMIN — LIDOCAINE HYDROCHLORIDE 10 ML: 10 INJECTION, SOLUTION INFILTRATION; PERINEURAL at 13:44

## 2024-08-22 RX ADMIN — LIDOCAINE HYDROCHLORIDE,EPINEPHRINE BITARTRATE 20 ML: 10; .01 INJECTION, SOLUTION INFILTRATION; PERINEURAL at 13:44

## 2024-08-22 NOTE — DISCHARGE INSTRUCTIONS
"NEEDLE BIOPSY BREAST    Activity: Rest the remainder of the day. You may resume normal activity after the next day. Avoid any vigorous/strenuous physical activity for 24 hours.    Comfort: If you have discomfort or tenderness at the site you may take your usual or recommended pain medication. Do not take aspirin the day of the procedure or for 48 hours following the biopsy.    Diet: You may resume your usual diet.    Care of site: Leave ice pack in place for 4 hours, or until it is no longer cold. The ice pack is reusable and may be refrozen.  Keep your bra and the dressing on for 24 hours. Then you may remove the bandage and shower. If there are steri-strips you may remove them in 3 to 5 days.  You may have some discomfort and a small amount of bruising where the biopsy was performed. This is normal. For several days or even a couple of weeks, you may have tenderness or \"twinges\" and a tiny bump where the needle went into the skin. This can be bothersome, but is not abnormal. You can use warm moist washcloths, as this may help. Do Not Use A Heating Pad.    RETURN TO THE EMERGENCY ROOM FOR:   Shortness of breath   Rapid heart rate   If pain becomes worse    Call Your Doctor For:    A fever over 101 degrees   Increased redness, increased swelling, and/or persistent drainage/discomfort  around the site    Other: At the end of your breast biopsy, a tiny titanium clip will be inserted through the biopsy needle and placed at the biopsy site within your breast. The marker provides a landmark of the biopsy for further mammograms or surgical procedures. This marker is MRI compatible and poses no known health risks.      If results are benign imaging may still recommended in 6 months by the radiologist after they review your pathology report. Our Radiology Department will call you to schedule this appointment.    For questions, problems, or concerns contact the Radiology Department at [phone].      "

## 2024-08-22 NOTE — PROGRESS NOTES
Pressure held on right breast biopsy site for 5 minutes. Sterile 2x2 placed over insertion site and covered with a sterile tegaderm.    Patient brought to mamms for post clip mammogram:  yes    Sports bra and small ice pad in place over dressing.

## 2024-08-23 ENCOUNTER — TELEPHONE (OUTPATIENT)
Dept: ULTRASOUND IMAGING | Facility: OTHER | Age: 49
End: 2024-08-23
Payer: COMMERCIAL

## 2024-08-23 NOTE — TELEPHONE ENCOUNTER
Called patient to check on status post Ultrasound breast biopsy.  Patient reports pain 6/10.  Patient is using tylenol and motrin with good relief.  Patient reports no bleeding.  Patient verbalizes understanding of importance of attending results appointment with Dr. Acevedo on 8/27 at 8:20 am.

## 2024-08-27 ENCOUNTER — OFFICE VISIT (OUTPATIENT)
Dept: SURGERY | Facility: OTHER | Age: 49
End: 2024-08-27
Attending: SURGERY
Payer: COMMERCIAL

## 2024-08-27 VITALS
HEART RATE: 100 BPM | SYSTOLIC BLOOD PRESSURE: 120 MMHG | BODY MASS INDEX: 34.48 KG/M2 | TEMPERATURE: 98.9 F | WEIGHT: 178 LBS | RESPIRATION RATE: 16 BRPM | DIASTOLIC BLOOD PRESSURE: 82 MMHG | OXYGEN SATURATION: 97 %

## 2024-08-27 DIAGNOSIS — D24.1 BREAST FIBROADENOMA, RIGHT: ICD-10-CM

## 2024-08-27 DIAGNOSIS — R92.8 ABNORMAL MAMMOGRAM OF RIGHT BREAST: Primary | ICD-10-CM

## 2024-08-27 LAB
PATH REPORT.COMMENTS IMP SPEC: NORMAL
PATH REPORT.FINAL DX SPEC: NORMAL
PHOTO IMAGE: NORMAL

## 2024-08-27 PROCEDURE — 99204 OFFICE O/P NEW MOD 45 MIN: CPT | Performed by: SURGERY

## 2024-08-27 ASSESSMENT — PAIN SCALES - GENERAL: PAINLEVEL: EXTREME PAIN (9)

## 2024-08-27 NOTE — PATIENT INSTRUCTIONS
Call placed to daughter La Nena after initiation of Ritalin.  She feels that patient sounds brighter on the phone, is more motivated, and overall more alert.  She is unsure if patient is receiving 1/2 tablet or full tablet daily however will check with nursing staff at patient's facility.  She wonders if dose could potentially be increased in the future if needed.    Family has met with a couple hospice agencies and have decided which agency they will use when it is time for patient to sign onto hospice.  Patient currently working with PT and OT to help improve strength, although La Nena feels it is unlikely that patient will gain more strength.    La Nena reports that there are varying opinions among family members about what is best for the patient going forward and how to get her increased care.  La Nena had general questions about hospice involvement, all of which were answered.    JIAN YuN, RN  Palliative Care Nurse Clinician    [phone] (Direct)  [phone] (Refills)  [phone] (Appointment Scheduling)       You will be due for bilateral mammogram in January-you will get a call to schedule that. If you notice a change or have a question, please call!

## 2024-08-27 NOTE — PROGRESS NOTES
Primary Care Physician: TRAIVS TREVINO MD    I was requested to see this patient in consultation by TRAVIS TREVINO MD for evaluation of right breast nodule. A copy of this note will be sent to TRAVIS TREVINO MD.    HPI:   The patient is 49 year old female with changing nodule noted in the right breast on recent short term interval mammogram. The nodule was confirmed to be in the 12 o'clock position 1 cm from the nipple. The patient hasn't noted any skin, nipple or breast changes. No previous breast cancer. No previous breast biopsy. No family history of breast cancer. The patient had biopsy performed that showed fibroadenoma with no atypia or malignancy.  Patient has been anxious since the biopsy, she hasn't had a lot of breast pain or bruising.    CONSULTATION ASSESSMENT AND PLAN/RECOMMENDATIONS: Fibroadenoma right breast  I discussed with the patient the pathophysiology of breast nodules and breast disease. We specifically discussed that most abnormalities seen on mammogram and US are not breast cancer. I explained that fibroadenomas are not a precancerous condition and that they don't turn into breast cancer. I recommended a 6 month breast mammogram to follow up breast changes after the recent biopsy, she will be due for bilateral mammogram at that time. The patient expressed understanding and denies further questions. The patient will call with questions or concerns.     REVIEW OF SYSTEMS  GENERAL: No fevers or chills. Denies fatigue, recent weight loss.  HEENT: No sinus drainage. No changes with vision or hearing. No difficulty swallowing.   LYMPHATICS:  No swollen nodes in axilla, neck or groin.  CARDIOVASCULAR: Denies chest pain, palpitations and dyspnea on exertion.  PULMONARY: No shortness of breath or cough. No increase in sputum production.  GI:Denies melena, bright red blood in stools. No hematemesis. No constipation or diarrhea.  : No dysuria or hematuria.  SKIN: No  recent rashes or ulcers.   HEMATOLOGY:  No history of easy bruising or bleeding.  ENDOCRINE:  No history of diabetes or thyroid problems.  NEUROLOGY:  No history of seizures or headaches. No motor or sensory changes.  BREAST:  As above.    Past Medical History:   Diagnosis Date    Encounter for gynecological examination without abnormal finding     08,Satisfactory GYN examination    Other pulmonary embolism without acute cor pulmonale (H)     History of pulmonary emboli after     Personal history of other medical treatment (CODE)      2, para 1-0-1-1    Personal history of other medical treatment (CODE)     10/00,History of blood transfusion with        Past Surgical History:   Procedure Laterality Date    ant/post colporr w enterocele incl cysturethroscopy  2017    Dr. Mc     SECTION      10/00 /06,History of blood transfusion with     COLONOSCOPY      ,,,F/U 2019    COLONOSCOPY N/A 2020    Procedure: COLONOSCOPY, WITH POLYPECTOMY AND BIOPSY;  Surgeon: Rachel Acevedo MD;  Location:  OR    COLONOSCOPY N/A 10/12/2020    1 sessile serrated, 1 tubular - f/u 3 years    COLONOSCOPY N/A 10/26/2023    normal exam with iliocolic anastomosis and family history of large polyps, follow up 10/26/28    Cryotherapy of Cervix      LAPAROSCOPIC ASSISTED HYSTERECTOMY VAGINAL, BILATERAL SALPINGO-OOPHORECTOMY, COMBINED      Ney Hart MD [facility]    LAPAROSCOPIC TUBAL LIGATION      2006    SEPTOPLASTY, TURBINOPLASTY, COMBINED N/A 2022    Procedure: Septoplasty, Bilateral turbinate reduction, excision left kiersten bullosa;  Surgeon: Laurie Stoddard MD;  Location: [facility] OR       Current Outpatient Medications   Medication Sig Dispense Refill    azelastine (ASTELIN) 0.1 % nasal spray INSTILL 1 SPRAY INTO EACH NOSTRIL TWICE DAILY      FLUoxetine (PROZAC) 40 MG capsule Take 1 capsule (40 mg) by mouth daily 90 capsule 3    gabapentin  (NEURONTIN) 300 MG capsule Take 1 capsule (300 mg) by mouth 3 times daily 90 capsule 11    linaclotide (LINZESS) 72 MCG capsule Take 1 capsule (72 mcg) by mouth once as needed 90 capsule 11    lisdexamfetamine (VYVANSE) 40 MG capsule Take 1 capsule (40 mg) by mouth daily for 30 days 30 capsule 0    [START ON 9/6/2024] lisdexamfetamine (VYVANSE) 40 MG capsule Take 1 capsule (40 mg) by mouth daily for 30 days 30 capsule 0    [START ON 10/6/2024] lisdexamfetamine (VYVANSE) 40 MG capsule Take 1 capsule (40 mg) by mouth daily for 30 days 30 capsule 0    traZODone (DESYREL) 100 MG tablet TAKE 3-4 TABLETS BY MOUTH AT BEDTIME.      Vitamin D, Cholecalciferol, 25 MCG (1000 UT) CAPS Take 10,000 Units by mouth daily       No current facility-administered medications for this visit.       Allergies   Allergen Reactions    Sulfa Antibiotics Hives       Family History   Problem Relation Age of Onset    Other - See Comments Mother         Psychiatric illness,Untreated anxiety/D&C for postmenopausal bleeding benign    Dementia Mother     Sleep Apnea Mother     Heart Failure Mother     Colon Cancer Father 38        Cancer-colon,Colon cancer    Hypertension Maternal Grandmother         Hypertension    Other - See Comments Maternal Grandmother         Alzheimer's    Cancer Maternal Grandfather         Cancer,Brain    Heart Disease Maternal Grandfather         Heart Disease,MI    Hypertension Maternal Grandfather         Hypertension    Heart Disease Paternal Grandmother         Heart Disease,CHF    Other - See Comments Maternal Uncle         Hemochromatosis    Cancer Maternal Uncle         Cancer,cancer all over.    Cancer Paternal Uncle         Cancer,Lung cancer    Diabetes Other         Diabetes,Diabetes    Muscular Dystrophy Nephew     Breast Cancer No family hx of         Cancer-breast       Social History     Socioeconomic History    Marital status:      Spouse name: None    Number of children: None    Years of education:  None    Highest education level: None   Tobacco Use    Smoking status: Every Day     Current packs/day: 0.25     Average packs/day: 0.3 packs/day for 30.0 years (7.5 ttl pk-yrs)     Types: Cigarettes     Passive exposure: Current    Smokeless tobacco: Never    Tobacco comments:     Cutting back   Vaping Use    Vaping status: Never Used   Substance and Sexual Activity    Alcohol use: Yes     Alcohol/week: 4.0 - 5.0 standard drinks of alcohol     Types: 4 - 5 Shots of liquor per week    Drug use: No     Comment: Drug use: No    Sexual activity: Yes     Partners: Male     Birth control/protection: Surgical   Social History Narrative    Worked for  for [employer].      Currently staying at home 2012    , has two children, lives with her  in a home that has a forced air furnace.  She does not have indoor pets          Shaun Lo        Sister  - lives [place]    Brother - lives in [place]      Social Determinants of Health     Financial Resource Strain: Low Risk  (1/8/2024)    Financial Resource Strain     Within the past 12 months, have you or your family members you live with been unable to get utilities (heat, electricity) when it was really needed?: No   Food Insecurity: Low Risk  (1/8/2024)    Food Insecurity     Within the past 12 months, did you worry that your food would run out before you got money to buy more?: No     Within the past 12 months, did the food you bought just not last and you didn t have money to get more?: No   Transportation Needs: Low Risk  (1/8/2024)    Transportation Needs     Within the past 12 months, has lack of transportation kept you from medical appointments, getting your medicines, non-medical meetings or appointments, work, or from getting things that you need?: No   Interpersonal Safety: Low Risk  (8/7/2024)    Interpersonal Safety     Do you feel physically and emotionally safe where you  currently live?: Yes     Within the past 12 months, have you been hit, slapped, kicked or otherwise physically hurt by someone?: No     Within the past 12 months, have you been humiliated or emotionally abused in other ways by your partner or ex-partner?: No   Housing Stability: Low Risk  (1/8/2024)    Housing Stability     Do you have housing? : Yes     Are you worried about losing your housing?: No     The above history was reviewed today, 8/27/2024    PHYSICAL EXAM  Vitals: /82 (BP Location: Right arm, Patient Position: Sitting, Cuff Size: Adult Large)   Pulse 100   Temp 98.9  F (37.2  C) (Tympanic)   Resp 16   Wt 80.7 kg (178 lb)   LMP  (LMP Unknown)   SpO2 97%   BMI 34.48 kg/m    GENERAL: Healthy appearing patient in no acute distress. Pleasant and cooperative with exam and interview.   HEENT: Head-normocephalic. Eyes-no scleral icterus. Nose-no nasal drainage. No lesions. Mouth-oral mucosa pink and moist, no lesions.  NECK: Supple. No thyroid nodules. Trachea midline.  LYMPHATICS:  No cervical, axillary or supraclavicular adenopathy.  CV: Regular rate and rhythm, no murmurs. No peripheral edema.  LUNGS: clear, no respiratory distress  SKIN: Pink, warm and dry. No jaundice. No rash.  NEURO:  Cranial nerves II-XII grossly intact. Alert and oriented.  PSYCH: Appropriate mood and affect.  BREAST: Breasts were examined in theseated and supine position. No mass noted bilaterally. No nipple changes or discharge bilaterally. Post biopsy changes noted right breast. Resolving ecchymosis with no sign of infection. Appropriate tenderness noted.    IMAGING/LAB  I personally reviewed patient's recent and previous mammogram, US images and biopsy images and report and pathology report.

## 2024-08-27 NOTE — NURSING NOTE
"Chief Complaint   Patient presents with    Consult     Right breast       Initial /82 (BP Location: Right arm, Patient Position: Sitting, Cuff Size: Adult Large)   Pulse 100   Temp 98.9  F (37.2  C) (Tympanic)   Resp 16   Wt 80.7 kg (178 lb)   LMP  (LMP Unknown)   SpO2 97%   BMI 34.48 kg/m   Estimated body mass index is 34.48 kg/m  as calculated from the following:    Height as of 8/7/24: 1.53 m (5' 0.25\").    Weight as of this encounter: 80.7 kg (178 lb).  Medication Reconciliation: complete    At what age did you start menopause? hysterectomy  What age did your menstrual cycle start? 10  Are you on or have you ever taken any hormone replacement or birth control? both  How many children do you have? 2  How old were you when your first child was born? 26  Did you breast feed? no  Do you have a family history of breast cancer? no  Yasmin Ambriz LPN..........8/27/2024  8:27 AM   "

## 2024-09-03 DIAGNOSIS — F33.41 RECURRENT MAJOR DEPRESSION IN PARTIAL REMISSION (H): ICD-10-CM

## 2024-09-03 DIAGNOSIS — F41.1 ANXIETY STATE: ICD-10-CM

## 2024-09-04 RX ORDER — TRAZODONE HYDROCHLORIDE 100 MG/1
TABLET ORAL
Qty: 360 TABLET | Refills: 0 | Status: SHIPPED | OUTPATIENT
Start: 2024-09-04

## 2024-09-04 NOTE — TELEPHONE ENCOUNTER
[facility] sent Rx request for the following:      Requested Prescriptions   Pending Prescriptions Disp Refills    traZODone (DESYREL) 100 MG tablet [Pharmacy Med Name: TRAZODONE 100MG TABLETS] 360 tablet 0     Sig: TAKE 3-4 TABLETS BY MOUTH EVERY NIGHT AT BEDTIME       Serotonin Modulators Failed - 9/3/2024  9:10 AM        Failed - PHQ-9 score less than 5 in past 6 months.     Please review last PHQ-9 score.           Failed - ERMELINDA-7 score of less than 5 in past 6 months.     Please review last ERMELINDA-7 score.          Last Prescription Date:   historic  Last Fill Qty/Refills:         , R-    Last Office Visit:              8/7/24   Future Office visit:           10/30/24    Routing refill request to provider for review/approval because:  Drug not on the G refill protocol     Blanquita Hanks RN on 9/4/2024 at 3:09 PM

## 2024-09-18 ENCOUNTER — MYC MEDICAL ADVICE (OUTPATIENT)
Dept: FAMILY MEDICINE | Facility: OTHER | Age: 49
End: 2024-09-18
Payer: COMMERCIAL

## 2024-09-18 NOTE — TELEPHONE ENCOUNTER
Called [facility] to ok a 2 day early fill on Vyvanse for 10/6.     They made this note in her Rx.      Patient update on MyChart.    Shalini Yeboah RN on 9/18/2024 at 3:53 PM

## 2024-09-18 NOTE — TELEPHONE ENCOUNTER
Yes - ok to fill two days early.  #28 confirmed on .  Kari Alvarado MD     PDMP Review         Value Time User    State PDMP site checked  Yes 9/18/2024  3:32 PM Kari Dietz MD

## 2024-09-18 NOTE — TELEPHONE ENCOUNTER
[facility] filled 9/6 script for #28 (stock issues).      Dispensed Days Supply Quantity Provider Pharmacy   LISDEXAMFETAMINE 40MG CAPSULES 09/06/2024 28 28 Units Kari Dietz MD [facility].       OK to fill 10/6 script 2 days early to accommodate for the shortage?      If so, I will call pharmacy.     Shalini Yeboah RN on 9/18/2024 at 3:24 PM

## 2024-10-29 ASSESSMENT — ANXIETY QUESTIONNAIRES
8. IF YOU CHECKED OFF ANY PROBLEMS, HOW DIFFICULT HAVE THESE MADE IT FOR YOU TO DO YOUR WORK, TAKE CARE OF THINGS AT HOME, OR GET ALONG WITH OTHER PEOPLE?: NOT DIFFICULT AT ALL
4. TROUBLE RELAXING: SEVERAL DAYS
3. WORRYING TOO MUCH ABOUT DIFFERENT THINGS: NOT AT ALL
5. BEING SO RESTLESS THAT IT IS HARD TO SIT STILL: SEVERAL DAYS
GAD7 TOTAL SCORE: 4
7. FEELING AFRAID AS IF SOMETHING AWFUL MIGHT HAPPEN: NOT AT ALL
GAD7 TOTAL SCORE: 4
7. FEELING AFRAID AS IF SOMETHING AWFUL MIGHT HAPPEN: NOT AT ALL
2. NOT BEING ABLE TO STOP OR CONTROL WORRYING: SEVERAL DAYS
IF YOU CHECKED OFF ANY PROBLEMS ON THIS QUESTIONNAIRE, HOW DIFFICULT HAVE THESE PROBLEMS MADE IT FOR YOU TO DO YOUR WORK, TAKE CARE OF THINGS AT HOME, OR GET ALONG WITH OTHER PEOPLE: NOT DIFFICULT AT ALL
6. BECOMING EASILY ANNOYED OR IRRITABLE: NOT AT ALL
GAD7 TOTAL SCORE: 4
1. FEELING NERVOUS, ANXIOUS, OR ON EDGE: SEVERAL DAYS

## 2024-10-30 ENCOUNTER — OFFICE VISIT (OUTPATIENT)
Dept: FAMILY MEDICINE | Facility: OTHER | Age: 49
End: 2024-10-30
Attending: FAMILY MEDICINE
Payer: COMMERCIAL

## 2024-10-30 VITALS
WEIGHT: 180 LBS | HEIGHT: 60 IN | DIASTOLIC BLOOD PRESSURE: 80 MMHG | BODY MASS INDEX: 35.34 KG/M2 | RESPIRATION RATE: 16 BRPM | SYSTOLIC BLOOD PRESSURE: 122 MMHG | OXYGEN SATURATION: 99 % | HEART RATE: 81 BPM | TEMPERATURE: 96.9 F

## 2024-10-30 DIAGNOSIS — F41.1 ANXIETY STATE: ICD-10-CM

## 2024-10-30 DIAGNOSIS — R41.840 ATTENTION AND CONCENTRATION DEFICIT: Primary | ICD-10-CM

## 2024-10-30 PROCEDURE — G2211 COMPLEX E/M VISIT ADD ON: HCPCS | Performed by: FAMILY MEDICINE

## 2024-10-30 PROCEDURE — 99214 OFFICE O/P EST MOD 30 MIN: CPT | Performed by: FAMILY MEDICINE

## 2024-10-30 RX ORDER — LISDEXAMFETAMINE DIMESYLATE 60 MG/1
60 CAPSULE ORAL DAILY
Qty: 30 CAPSULE | Refills: 0 | Status: SHIPPED | OUTPATIENT
Start: 2024-11-29 | End: 2024-12-29

## 2024-10-30 RX ORDER — LISDEXAMFETAMINE DIMESYLATE 40 MG/1
40 CAPSULE ORAL DAILY
Qty: 30 CAPSULE | Refills: 0 | Status: CANCELLED | OUTPATIENT
Start: 2024-10-30

## 2024-10-30 RX ORDER — AMOXICILLIN 500 MG/1
CAPSULE ORAL
COMMUNITY
Start: 2024-10-10

## 2024-10-30 RX ORDER — LISDEXAMFETAMINE DIMESYLATE 60 MG/1
60 CAPSULE ORAL DAILY
Qty: 30 CAPSULE | Refills: 0 | Status: SHIPPED | OUTPATIENT
Start: 2024-10-30 | End: 2024-11-29

## 2024-10-30 RX ORDER — HYDROCODONE BITARTRATE AND ACETAMINOPHEN 5; 325 MG/1; MG/1
1 TABLET ORAL EVERY 6 HOURS PRN
COMMUNITY
Start: 2024-10-28

## 2024-10-30 RX ORDER — FLUOXETINE 40 MG/1
40 CAPSULE ORAL DAILY
Qty: 90 CAPSULE | Refills: 3 | Status: SHIPPED | OUTPATIENT
Start: 2024-10-30

## 2024-10-30 RX ORDER — DEXAMETHASONE 4 MG/1
TABLET ORAL
COMMUNITY
Start: 2024-10-28

## 2024-10-30 RX ORDER — LISDEXAMFETAMINE DIMESYLATE 60 MG/1
60 CAPSULE ORAL DAILY
Qty: 30 CAPSULE | Refills: 0 | Status: SHIPPED | OUTPATIENT
Start: 2024-12-29 | End: 2025-01-28

## 2024-10-30 ASSESSMENT — PAIN SCALES - GENERAL: PAINLEVEL_OUTOF10: SEVERE PAIN (6)

## 2024-10-30 NOTE — NURSING NOTE
"Chief Complaint   Patient presents with    Recheck Medication     Refill - Vyvanse         Initial /80 (BP Location: Right arm, Patient Position: Sitting, Cuff Size: Adult Regular)   Pulse 81   Temp 96.9  F (36.1  C) (Temporal)   Resp 16   Ht 1.53 m (5' 0.25\")   Wt 81.6 kg (180 lb)   LMP  (LMP Unknown)   SpO2 99%   Breastfeeding No   BMI 34.86 kg/m   Estimated body mass index is 34.86 kg/m  as calculated from the following:    Height as of this encounter: 1.53 m (5' 0.25\").    Weight as of this encounter: 81.6 kg (180 lb).    Medication Review: complete    The next two questions are to help us understand your food security.  If you are feeling you need any assistance in this area, we have resources available to support you today.          1/8/2024   SDOH- Food Insecurity   Within the past 12 months, did you worry that your food would run out before you got money to buy more? N    Within the past 12 months, did the food you bought just not last and you didn t have money to get more? N        Patient-reported     Health Care Directive:  Patient does not have a Health Care Directive: Discussed advance care planning with patient; however, patient declined at this time.    Sarah Andrade LPN      "

## 2024-10-30 NOTE — PROGRESS NOTES
"    Assessment & Plan       ICD-10-CM    1. Attention and concentration deficit  R41.840 lisdexamfetamine (VYVANSE) 60 MG capsule     lisdexamfetamine (VYVANSE) 60 MG capsule     lisdexamfetamine (VYVANSE) 60 MG capsule      2. Anxiety state  F41.1 FLUoxetine (PROZAC) 40 MG capsule            At this point, we will change Vyvanse to 60 mg a day.  Potential side effects of this medication are discussed, especially with change in dose.  Discouraged alcohol or other substance use with this.  We reviewed safeguarding of medication. Discussed concerns with concurrent prescription from her dentist and how she needs to inform the clinic of controlled substance prescriptions.   Prozac refilled today  Follow up in 3 months  - has physical scheduled and says she will get her flu vaccine then     PDMP Review         Value Time User    State PDMP site checked  Yes 10/30/2024 10:38 AM Kari Dietz MD                   The longitudinal plan of care for mental health was addressed during this visit. Due to the added complexity in care, I will continue to support Radha ARANA Joseph in the subsequent management of this condition(s) and with the ongoing continuity of care of this condition(s).        BMI  Estimated body mass index is 34.86 kg/m  as calculated from the following:    Height as of this encounter: 1.53 m (5' 0.25\").    Weight as of this encounter: 81.6 kg (180 lb).         Return in about 3 months (around 1/30/2025).    KARI TREVINO MD  [facility]    Subjective   Nursing Notes:   Sarah Andrade LPN  10/30/2024 10:23 AM  Signed  Chief Complaint   Patient presents with    Recheck Medication     Refill - Vyvanse         Initial /80 (BP Location: Right arm, Patient Position: Sitting, Cuff Size: Adult Regular)   Pulse 81   Temp 96.9  F (36.1  C) (Temporal)   Resp 16   Ht 1.53 m (5' 0.25\")   Wt 81.6 kg (180 lb)   LMP  (LMP Unknown)   SpO2 99%   Breastfeeding No   " "BMI 34.86 kg/m   Estimated body mass index is 34.86 kg/m  as calculated from the following:    Height as of this encounter: 1.53 m (5' 0.25\").    Weight as of this encounter: 81.6 kg (180 lb).    Medication Review: complete    The next two questions are to help us understand your food security.  If you are feeling you need any assistance in this area, we have resources available to support you today.          1/8/2024   SDOH- Food Insecurity   Within the past 12 months, did you worry that your food would run out before you got money to buy more? N    Within the past 12 months, did the food you bought just not last and you didn t have money to get more? N        Patient-reported     Health Care Directive:  Patient does not have a Health Care Directive: Discussed advance care planning with patient; however, patient declined at this time.    Sarah Andrade, LAWRENCE                                      HPI Radha Ruiz is a 49 year old female presents for medication follow up.  She is currently on vyvanse.  Due for a dose increase to match her previous dose of adderall.   Also needs refill of Prozac.    She is on lortab from her dentist.    Recent root canal/infection.    With steroids too - isn't sleeping as well.        Answers submitted by the patient for this visit:  Patient Health Questionnaire (Submitted on 8/6/2024)  If you checked off any problems, how difficult have these problems made it for you to do your work, take care of things at home, or get along with other people?: Somewhat difficult  PHQ9 TOTAL SCORE: 6  General Questionnaire (Submitted on 8/6/2024)  Chief Complaint: Chronic problems general questions HPI Form  What is the reason for your visit today? : New doctor health check  How many servings of fruits and vegetables do you eat daily?: 2-3  On average, how many sweetened beverages do you drink each day (Examples: soda, juice, sweet tea, etc.  Do NOT count diet or artificially sweetened beverages)?: " 1  How many minutes a day do you exercise enough to make your heart beat faster?: 30 to 60  How many days a week do you exercise enough to make your heart beat faster?: 5  How many days per week do you miss taking your medication?: 0      Current Outpatient Medications   Medication Sig Dispense Refill    amoxicillin (AMOXIL) 500 MG capsule       azelastine (ASTELIN) 0.1 % nasal spray INSTILL 1 SPRAY INTO EACH NOSTRIL TWICE DAILY      dexAMETHasone (DECADRON) 4 MG tablet       FLUoxetine (PROZAC) 40 MG capsule Take 1 capsule (40 mg) by mouth daily. 90 capsule 3    gabapentin (NEURONTIN) 300 MG capsule Take 1 capsule (300 mg) by mouth 3 times daily 90 capsule 11    HYDROcodone-acetaminophen (NORCO) 5-325 MG tablet Take 1 tablet by mouth every 6 hours as needed.      linaclotide (LINZESS) 72 MCG capsule Take 1 capsule (72 mcg) by mouth once as needed 90 capsule 11    lisdexamfetamine (VYVANSE) 40 MG capsule Take 1 capsule (40 mg) by mouth daily for 30 days 30 capsule 0    lisdexamfetamine (VYVANSE) 60 MG capsule Take 1 capsule (60 mg) by mouth daily. 30 capsule 0    [START ON 2024] lisdexamfetamine (VYVANSE) 60 MG capsule Take 1 capsule (60 mg) by mouth daily. 30 capsule 0    [START ON 2024] lisdexamfetamine (VYVANSE) 60 MG capsule Take 1 capsule (60 mg) by mouth daily. 30 capsule 0    traZODone (DESYREL) 100 MG tablet TAKE 3-4 TABLETS BY MOUTH EVERY NIGHT AT BEDTIME 360 tablet 0    Vitamin D, Cholecalciferol, 25 MCG (1000 UT) CAPS Take 10,000 Units by mouth daily       No current facility-administered medications for this visit.     Past Medical History:   Diagnosis Date    Encounter for gynecological examination without abnormal finding     08,Satisfactory GYN examination    Other pulmonary embolism without acute cor pulmonale (H)     History of pulmonary emboli after     Personal history of other medical treatment (CODE)      2, para 1-0-1-1    Personal history of other medical  "treatment (CODE)     10/00,History of blood transfusion with                Review of Systems           2024    10:43 AM 2024     9:30 AM 2024     4:08 PM   PHQ   PHQ-9 Total Score 3 8 6   Q9: Thoughts of better off dead/self-harm past 2 weeks Not at all  Not at all  Not at all        Patient-reported         2024    10:47 AM 2024     9:36 AM 10/29/2024     2:40 PM   ERMELINDA-7 SCORE   Total Score 2 (minimal anxiety) 8 (mild anxiety) 4 (minimal anxiety)   Total Score 2 8 4        Patient-reported             Objective  /80 (BP Location: Right arm, Patient Position: Sitting, Cuff Size: Adult Regular)   Pulse 81   Temp 96.9  F (36.1  C) (Temporal)   Resp 16   Ht 1.53 m (5' 0.25\")   Wt 81.6 kg (180 lb)   LMP  (LMP Unknown)   SpO2 99%   Breastfeeding No   BMI 34.86 kg/m     Physical Exam   GENERAL: alert and no distress    Lab on 2024   Component Date Value Ref Range Status    PEth 16:0/18:1 (POPEth) 2024 13  ng/mL Final    PEth 16:0/18:1 (POPEth)  Less than 10 ng/mL............Not detected  Less than 20 ng/mL............Abstinence or light alcohol   consumption  20 - 200 ng/mL................Moderate alcohol consumption  Greater than 200 ng/mL........Heavy alcohol consumption or   chronic alcohol use    (Reference: MYKE Ying and ONDINA Wetzel 2018 J. Forensic Sci)    PEth 16:0/18:2 (PLPEth) 2024 16  ng/mL Final    Reference ranges are not well established.    EER Phosphatidylethanol (PETH) 2024 See Note   Final    Authorized individuals can access the [facility]   Enhanced Report using the following link:      [email or web address]    PEth Interpretation 2024 See Comment   Final    Comment: Phosphatidylethanol (PEth) is a group of phospholipids   formed in the presence of ethanol, phospholipase D and   phosphatidylcholine. PEth is known to be a direct alcohol   biomarker. The predominant PEth homologues are PEth   16:0/18:1 (POPEth) " and PEth 16:0/18:2 (PLPEth), which   account for 37-46% and 26-28% of the total PEth homologues,   respectively. PEth is incorporated into the phospholipid   membrane of red blood cells and has a general half-life of   4-10 days and a window of detection of 2-4 weeks. However,   the window of detection is longer in individuals who   chronically or excessively consume alcohol. The limit of   quantification is 10 ng/mL. Serial monitoring of PEth may   be helpful in monitoring alcohol abstinence over time. PEth   results should be interpreted in the context of the   patient's clinical and behavioral history.  Patients with advanced liver disease may have falsely   elevated PEth concentrations (Lizette HOUSE et al 2018,   Alcoholism Clinical &                            Experimental Research).    This test was developed and its performance characteristics   determined by [facility]. It has not been cleared or   approved by the U.S. Food and Drug Administration. This   test was performed in a CLIA-certified laboratory and is   intended for clinical purposes.  Performed By: [facility]  [address] [place] [address]  : Gabe Delgado MD, PhD  CLIA Number: [id number]    Gabapentin (Neurontin) 07/19/2024 Present (A)  Absent Final    Sources of gabapentin are prescription medications.    Amphetamine ng/mL 07/19/2024 540 (H)  <50 ng/mL Final    Amphetamine 07/19/2024 3,375  Absent ng/mg [creat] Final    Sources of amphetamine include illicit sources, as a scheduled prescription medication, as a metabolite of some prescription drugs, or use of an l-methamphetamine inhaler.  Amphetamine is an expected metabolite of methamphetamine. Amphetamine is also available as a scheduled prescription drug.    Creatinine Urine for Drug Screen 07/19/2024 16  mg/dL Final    The reference range has not been established for creatinine in random urines. The results should be integrated into the  clinical context for interpretation.

## 2024-11-26 ENCOUNTER — MYC REFILL (OUTPATIENT)
Dept: FAMILY MEDICINE | Facility: OTHER | Age: 49
End: 2024-11-26
Payer: COMMERCIAL

## 2024-11-26 DIAGNOSIS — F41.1 ANXIETY STATE: ICD-10-CM

## 2024-11-26 DIAGNOSIS — F33.41 RECURRENT MAJOR DEPRESSION IN PARTIAL REMISSION (H): ICD-10-CM

## 2024-11-27 RX ORDER — FLUOXETINE 40 MG/1
40 CAPSULE ORAL DAILY
Qty: 90 CAPSULE | Refills: 3 | Status: SHIPPED | OUTPATIENT
Start: 2024-11-27

## 2024-11-27 RX ORDER — TRAZODONE HYDROCHLORIDE 100 MG/1
TABLET ORAL
Qty: 360 TABLET | Refills: 0 | Status: SHIPPED | OUTPATIENT
Start: 2024-11-27

## 2025-01-05 NOTE — PROGRESS NOTES
Preventive Care Visit  [facility]  TRAVIS TREVINO MD, Family Medicine  Jan 8, 2025      Assessment & Plan       ICD-10-CM    1. Physical exam, annual  Z00.00 Comprehensive Metabolic Panel     Hemoglobin A1c     Lipid Profile      2. Anxiety state  F41.1 FLUoxetine (PROZAC) 40 MG capsule     traZODone (DESYREL) 100 MG tablet      3. Recurrent major depression in partial remission  F33.41 traZODone (DESYREL) 100 MG tablet      4. Attention and concentration deficit  R41.840 lisdexamfetamine (VYVANSE) 60 MG capsule     lisdexamfetamine (VYVANSE) 60 MG capsule     lisdexamfetamine (VYVANSE) 60 MG capsule      5. Nicotine use disorder  F17.200       6. Personal history of tobacco use  Z87.891 Prof fee: Shared Decision Making for Lung Cancer Screening     CT Chest Lung Cancer Scrn Low Dose wo      7. History of pulmonary embolism  Z86.711       8. Irritable bowel syndrome, unspecified type  K58.9       9. Bilateral hip pain  M25.551 XR Pelvis and Hip Bilateral 2 Views    M25.552       10. Caregiver stress  Z63.6       11. Nerve pain  M79.2 gabapentin (NEURONTIN) 300 MG capsule      12. Class 1 obesity without serious comorbidity with body mass index (BMI) of 34.0 to 34.9 in adult, unspecified obesity type  E66.811 ZEPBOUND 2.5 MG/0.5ML prefilled pen    Z68.34 Comprehensive Metabolic Panel     Hemoglobin A1c     Lipid Profile           Declined flu vaccine today  Colon cancer screening up to date  Pap smear  - discontinued due to hysterectomy for benign disease  Mammogram due this summer  Bilateral hip pain, consider bursitis, osteoarthritis - x-ray today  ADHD, stable with current medications and counseling.  Tox screen done 7/2024 and contract updated today.  Continue same treatment with vyvanse 60mg a day.  Patient wishes to discuss wt loss medication.  Discussed medication use limited due to vyvanse/which medications are contraindicated.  With her IBS-C, discussed use of GLP 1 with  "caution.  Also discussed that this may not be covered by her insurance.  Prescription for zepbound 2.5mg/week sent.  Discussed nausea, constipation, pancreatis, thyroid risk.  Ongoing physical activity, dietary/nutrition changes     8.  Continue Prozac and counseling for chronic anxiety   9.  Lung cancer screening - wishes to proceed with this.  Patient is aware that primary prevention is smoking cessation.          Nicotine/Tobacco Cessation  She reports that she has been smoking cigarettes. She has a 7.5 pack-year smoking history. She has been exposed to tobacco smoke. She has never used smokeless tobacco.  Nicotine/Tobacco Cessation Plan  Information offered: Patient not interested at this time      BMI  Estimated body mass index is 34 kg/m  as calculated from the following:    Height as of this encounter: 1.537 m (5' 0.5\").    Weight as of this encounter: 80.3 kg (177 lb).   Wt management plan above     Counseling  Appropriate preventive services were addressed with this patient via screening, questionnaire, or discussion as appropriate for fall prevention, nutrition, physical activity, Tobacco-use cessation, social engagement, weight loss and cognition.  Checklist reviewing preventive services available has been given to the patient.  Reviewed patient's diet, addressing concerns and/or questions.   She is at risk for lack of exercise and has been provided with information to increase physical activity for the benefit of her well-being.   She is at risk for psychosocial distress and has been provided with information to reduce risk.         Return in about 3 months (around 4/8/2025).    Mike Garcia is a 49 year old, presenting for the following:  Physical (Annual well visit)        1/8/2025     1:21 PM   Additional Questions   Roomed by Sarah DOLAN LPN          HPI  Radha Ruiz is a 49 year old female in for annual exam.   Lung cancer screening  ADHD   - [facility] - " Blanquita Gale    Bilateral hip pain - hurts to lay on either side, hasn't had xrays done  ; still has her stretching and ROM.    Weight management:  been at current wt for about 10 years; exercises regularly and feels that she eats pretty ok  Chronic constipation  - on linzess and takes fiber daily   If she gets a cold she gets a sinus infection, chronic nasal obstruction symptoms and uses breathe rights each night, uses neti pot/rinse each night; uses advil and zyrtecD.             Health Care Directive  Patient does not have a Health Care Directive:       1/7/2025   General Health   How would you rate your overall physical health? (!) FAIR   Feel stress (tense, anxious, or unable to sleep) To some extent   (!) STRESS CONCERN      1/7/2025   Nutrition   Three or more servings of calcium each day? Yes   Diet: Other   If other, please elaborate: Fast from 8pm until 10am   How many servings of fruit and vegetables per day? (!) 2-3   How many sweetened beverages each day? 0-1         1/7/2025   Exercise   Days per week of moderate/strenous exercise 3 days   Average minutes spent exercising at this level 40 min         1/7/2025   Social Factors   Frequency of gathering with friends or relatives Patient declined   Worry food won't last until get money to buy more No   Food not last or not have enough money for food? No   Do you have housing? (Housing is defined as stable permanent housing and does not include staying ouside in a car, in a tent, in an abandoned building, in an overnight shelter, or couch-surfing.) Yes   Are you worried about losing your housing? No   Lack of transportation? No   Unable to get utilities (heat,electricity)? No         1/7/2025   Fall Risk   Fallen 2 or more times in the past year? No   Trouble with walking or balance? No          1/7/2025   Dental   Dentist two times every year? Yes         1/7/2025   TB Screening   Were you born outside of the [place]? No       Today's PHQ-9 Score:        1/7/2025     4:45 PM   PHQ-9 SCORE   PHQ-9 Total Score MyChart 4 (Minimal depression)   PHQ-9 Total Score 4        Patient-reported         1/7/2025   Substance Use   If I could quit smoking, I would Somewhat disagree   I want to quit somking, worry about health affects Somewhat disagree   Willing to make a plan to quit smoking Somewhat disagree   Willing to cut down before quitting Neutral   Alcohol more than 3/day or more than 7/wk No   Do you use any other substances recreationally? No     Social History     Tobacco Use    Smoking status: Every Day     Current packs/day: 0.25     Average packs/day: 0.3 packs/day for 30.0 years (7.5 ttl pk-yrs)     Types: Cigarettes     Passive exposure: Current    Smokeless tobacco: Never    Tobacco comments:     Cutting back   Vaping Use    Vaping status: Never Used   Substance Use Topics    Alcohol use: Yes     Alcohol/week: 4.0 - 5.0 standard drinks of alcohol     Types: 4 - 5 Shots of liquor per week    Drug use: No     Comment: Drug use: No           1/29/2024   LAST FHS-7 RESULTS   1st degree relative breast or ovarian cancer No   Any relative bilateral breast cancer No   Any male have breast cancer No   Any ONE woman have BOTH breast AND ovarian cancer No   Any woman with breast cancer before 50yrs No   2 or more relatives with breast AND/OR ovarian cancer No   2 or more relatives with breast AND/OR bowel cancer No        Mammogram Screening - Mammogram every 1-2 years updated in Health Maintenance based on mutual decision making          1/7/2025   One time HIV Screening   Previous HIV test? No         1/7/2025   STI Screening   New sexual partner(s) since last STI/HIV test? No     History of abnormal Pap smear: Status post hysterectomy with removal of cervix and no history of CIN2 or greater or cervical cancer. Health Maintenance and Surgical History updated.       ASCVD Risk   The 10-year ASCVD risk score (Emory LAMBERT, et al., 2019) is: 2.7%    Values used to  calculate the score:      Age: 49 years      Sex: Female      Is Non- : No      Diabetic: No      Tobacco smoker: Yes      Systolic Blood Pressure: 120 mmHg      Is BP treated: No      HDL Cholesterol: 64 mg/dL      Total Cholesterol: 195 mg/dL           Reviewed and updated as needed this visit by Provider                    Past Medical History:   Diagnosis Date    ADHD (attention deficit hyperactivity disorder)     Blood Transfusion with  10/2000    10/00,History of blood transfusion with     ERMELINDA (generalized anxiety disorder)     Other pulmonary embolism without acute cor pulmonale (H)     History of pulmonary emboli after     Personal history of other medical treatment (CODE)      2, para 1-0-1-1     Past Surgical History:   Procedure Laterality Date    ant/post colporr w enterocele incl cysturethroscopy  2017    Dr. Mc     SECTION      10/00 /06,History of blood transfusion with     COLONOSCOPY      ,,,F/U 2019    COLONOSCOPY N/A 2020    Procedure: COLONOSCOPY, WITH POLYPECTOMY AND BIOPSY;  Surgeon: Rachel Acevedo MD;  Location: [facility] OR    COLONOSCOPY N/A 10/12/2020    1 sessile serrated, 1 tubular - f/u 3 years    COLONOSCOPY N/A 10/26/2023    normal exam with iliocolic anastomosis and family history of large polyps, follow up 10/26/28    Cryotherapy of Cervix      LAPAROSCOPIC ASSISTED HYSTERECTOMY VAGINAL, BILATERAL SALPINGO-OOPHORECTOMY, COMBINED      Ney Hart MD [facility]    LAPAROSCOPIC TUBAL LIGATION          SEPTOPLASTY, TURBINOPLASTY, COMBINED N/A 2022    Procedure: Septoplasty, Bilateral turbinate reduction, excision left kiersten bullosa;  Surgeon: Laurie Stoddard MD;  Location: [facility] OR     OB History    Para Term  AB Living   3 2 0 0 0 0   SAB IAB Ectopic Multiple Live Births   0 0 0 0 2      # Outcome Date GA Lbr Salazar/2nd Weight Sex Type Anes PTL Lv   3  "            2 Para            1 Para                  Review of Systems  Glasses - last check a year ago  Dentist - root canal this past year      Objective    Exam  /78 (BP Location: Right arm, Patient Position: Sitting, Cuff Size: Adult Regular)   Pulse 94   Temp 96.9  F (36.1  C) (Temporal)   Resp 14   Ht 1.537 m (5' 0.5\")   Wt 80.3 kg (177 lb)   LMP  (LMP Unknown)   SpO2 99%   Breastfeeding No   BMI 34.00 kg/m     Estimated body mass index is 34 kg/m  as calculated from the following:    Height as of this encounter: 1.537 m (5' 0.5\").    Weight as of this encounter: 80.3 kg (177 lb).    Physical Exam  GENERAL: alert and no distress  EYES: Eyes grossly normal to inspection, PERRL and conjunctivae and sclerae normal  HENT: ear canals and TM's normal, nose and mouth without ulcers or lesions  NECK: no adenopathy, no asymmetry, masses, or scars  RESP: lungs clear to auscultation - no rales, rhonchi or wheezes  CV: regular rate and rhythm, normal S1 S2, no S3 or S4, no murmur, click or rub, no peripheral edema  ABDOMEN: soft, nontender, no hepatosplenomegaly, no masses and bowel sounds normal  MS: no gross musculoskeletal defects noted, no edema  SKIN: no suspicious lesions or rashes  NEURO: Normal strength and tone, mentation intact and speech normal  PSYCH: mentation appears normal, affect normal/bright        Signed Electronically by: TRAVIS TREVINO MD          Lung Cancer Screening Shared Decision Making Visit     Radha Ruiz, a 49 year old female, is eligible for lung cancer screening    History   Smoking Status    Every Day    Types: Cigarettes   Smokeless Tobacco    Never       I have discussed with patient the risks and benefits of screening for lung cancer with low-dose CT.     The risks include:    radiation exposure: one low dose chest CT has as much ionizing radiation as about 15 chest x-rays, or 6 months of background radiation living in [place]      false " positives: most findings/nodules are NOT cancer, but some might still require additional diagnostic evaluation, including biopsy    over-diagnosis: some slow growing cancers that might never have been clinically significant will be detected and treated unnecessarily     The benefit of early detection of lung cancer is contingent upon adherence to annual screening or more frequent follow up if indicated.     Furthermore, to benefit from screening, Radha must be willing and able to undergo diagnostic procedures, if indicated. Although no specific guide is available for determining severity of comorbidities, it is reasonable to withhold screening in patients who have greater mortality risk from other diseases.     We did discuss that the best way to prevent lung cancer is to not smoke.    Some patients may value a numeric estimation of lung cancer risk when evaluating if lung cancer screening is right for them, here is one calculator:    ShouldIScreen  Answers submitted by the patient for this visit:  Patient Health Questionnaire (Submitted on 1/7/2025)  If you checked off any problems, how difficult have these problems made it for you to do your work, take care of things at home, or get along with other people?: Somewhat difficult  PHQ9 TOTAL SCORE: 4

## 2025-01-05 NOTE — PATIENT INSTRUCTIONS
You are currently being prescribed a controlled substance.  You need to be aware of the risks of taking this medication.  By signing a medication contract, you accept these risks and side effects.      Any medical treatment is initially a trial, and that continued prescribing is based on evidence of benefit without an unacceptable risk. Understand that the goal of using stimulant medications is to increase functional level. If your function does not significantly increase, the medication should and will be stopped.    Be aware that the use of such medicine has certain risks associated with it, including, but not limited to:  Insomnia, racing heart rate and palpitations, tremors, dry eyes, dry mouth, jameson, mood irritability, sexual dysfunction, physical dependence, tolerance to analgesia,addiction, withdrawal and the possibility that the medicine will not provide complete relief.  Usage is associated with a risk of unintended injuries.    This medication will be strictly monitored and all of your medications should be filled at the same pharmacy.  (Should the need arise to change pharmacies our office must be informed).    It is your responsibility to share that you are on a medication contract with your other health care providers, including your dentist.    Lung Cancer Screening   Frequently Asked Questions  If you are at high-risk for lung cancer, getting screened with low-dose computed tomography (LDCT) every year can help save your life. This handout offers answers to some of the most common questions about lung cancer screening. If you have other questions, please call [phone] ([phone]).     What is it?  Lung cancer screening uses special X-ray technology to create an image of your lung tissue. The exam is quick and easy and takes less than 10 seconds. We don t give you any medicine or use any needles. You can eat before and after the exam. You don t need to change your clothes as long as the  clothing on your chest doesn t contain metal. But, you do need to be able to hold your breath for at least 6 seconds during the exam.    What is the goal of lung cancer screening?  The goal of lung cancer screening is to save lives. Many times, lung cancer is not found until a person starts having physical symptoms. Lung cancer screening can help detect lung cancer in the earliest stages when it may be easier to treat.    Who should be screened for lung cancer?  We suggest lung cancer screening for anyone who is at high-risk for lung cancer. You are in the high-risk group if you:     are between the ages of 55 and 79, and   have smoked at least 1 pack of cigarettes a day for 20 or more years, and   still smoke or have quit within the past 15 years.    However, if you have a new cough or shortness of breath, you should talk to your doctor before being screened.    Why does it matter if I have symptoms?  Certain symptoms can be a sign that you have a condition in your lungs that should be checked and treated by your doctor. These symptoms include fever, chest pain, a new or changing cough, shortness of breath that you have never felt before, coughing up blood or unexplained weight loss. Having any of these symptoms can greatly affect the results of lung cancer screening.       Should all smokers get an LDCT lung cancer screening exam?  It depends. Lung cancer screening is for a very specific group of men and women who have a history of heavy smoking over a long period of time (see  Who should be screened for lung cancer  above).  I am in the high-risk group, but have been diagnosed with cancer in the past. Is LDCT lung cancer screening right for me?  In some cases, you should not have LDCT lung screening, such as when your doctor is already following your cancer with CT scan studies. Your doctor will help you decide if LDCT lung screening is right for you.  Do I need to have a screening exam every year?  Yes. If you  are in the high-risk group described earlier, you should get an LDCT lung cancer screening exam every year until you are 79, or are no longer willing or able to undergo screening and possible procedures to diagnose and treat lung cancer.  How effective is LDCT at preventing death from lung cancer?  Studies have shown that LDCT lung cancer screening can lower the risk of death from lung cancer by 20 percent in people who are at high-risk.  What are the risks?  There are some risks and limitations of LDCT lung cancer screening. We want to make sure you understand the risks and benefits, so please let us know if you have any questions. Your doctor may want to talk with you more about these risks.   Radiation exposure: As with any exam that uses radiation, there is a very small increased risk of cancer. The amount of radiation in LDCT is small--about the same amount a person would get from a mammogram. Your doctor orders the exam when he or she feels the potential benefits outweigh the risks.   False negatives: No test is perfect, including LDCT. It is possible that you may have a medical condition, including lung cancer, that is not found during your exam. This is called a false negative result.   False positives and more testing: LDCT very often finds something in the lung that could be cancer, but in fact is not. This is called a false positive result. False positive tests often cause anxiety. To make sure these findings are not cancer, you may need to have more tests. These tests will be done only if you give us permission. Sometimes patients need a treatment that can have side effects, such as a biopsy. For more information on false positives, see  What can I expect from the results?    Findings not related to lung cancer: Your LDCT exam also takes pictures of areas of your body next to your lungs. In a very small number of cases, the CT scan will show an abnormal finding in one of these areas, such as your  kidneys, adrenal glands, liver or thyroid. This finding may not be serious, but you may need more tests. Your doctor can help you decide what other tests you may need, if any.  What can I expect from the results?  About 1 out of 4 LDCT exams will find something that may need more tests. Most of the time, these findings are lung nodules. Lung nodules are very small collections of tissue in the lung. These nodules are very common, and the vast majority--more than 97 percent--are not cancer (benign). Most are normal lymph nodes or small areas of scarring from past infections.  But, if a small lung nodule is found to be cancer, the cancer can be cured more than 90 percent of the time. To know if the nodule is cancer, we may need to get more images before your next yearly screening exam. If the nodule has suspicious features (for example, it is large, has an odd shape or grows over time), we will refer you to a specialist for further testing.  Will my doctor also get the results?  Yes. Your doctor will get a copy of your results.  Is it okay to keep smoking now that there s a cancer screening exam?  No. Tobacco is one of the strongest cancer-causing agents. It causes not only lung cancer, but other cancers and cardiovascular (heart) diseases as well. The damage caused by smoking builds over time. This means that the longer you smoke, the higher your risk of disease. While it is never too late to quit, the sooner you quit, the better.  Where can I find help to quit smoking?  The best way to prevent lung cancer is to stop smoking. If you have already quit smoking, congratulations and keep it up! For help on quitting smoking, please call [employer] at 6-852-QUITNOW (1-784.178.3144) or the American [employer] Society at 1-131.800.7260 to find local resources near you.  One-on-one health coaching:  If you d prefer to work individually with a health care provider on tobacco cessation, we offer:     Medication Therapy Management:   Our specially trained pharmacists work closely with you and your doctor to help you quit smoking.  Call [phone] or [phone] (toll free).

## 2025-01-07 SDOH — HEALTH STABILITY: PHYSICAL HEALTH: ON AVERAGE, HOW MANY MINUTES DO YOU ENGAGE IN EXERCISE AT THIS LEVEL?: 40 MIN

## 2025-01-07 SDOH — HEALTH STABILITY: PHYSICAL HEALTH: ON AVERAGE, HOW MANY DAYS PER WEEK DO YOU ENGAGE IN MODERATE TO STRENUOUS EXERCISE (LIKE A BRISK WALK)?: 3 DAYS

## 2025-01-07 ASSESSMENT — SOCIAL DETERMINANTS OF HEALTH (SDOH): HOW OFTEN DO YOU GET TOGETHER WITH FRIENDS OR RELATIVES?: PATIENT DECLINED

## 2025-01-08 ENCOUNTER — HOSPITAL ENCOUNTER (OUTPATIENT)
Dept: GENERAL RADIOLOGY | Facility: OTHER | Age: 50
Discharge: HOME | End: 2025-01-08
Attending: FAMILY MEDICINE
Payer: COMMERCIAL

## 2025-01-08 ENCOUNTER — OFFICE VISIT (OUTPATIENT)
Dept: FAMILY MEDICINE | Facility: OTHER | Age: 50
End: 2025-01-08
Attending: FAMILY MEDICINE
Payer: COMMERCIAL

## 2025-01-08 VITALS
RESPIRATION RATE: 14 BRPM | SYSTOLIC BLOOD PRESSURE: 120 MMHG | BODY MASS INDEX: 33.42 KG/M2 | DIASTOLIC BLOOD PRESSURE: 78 MMHG | TEMPERATURE: 96.9 F | HEART RATE: 94 BPM | OXYGEN SATURATION: 99 % | HEIGHT: 61 IN | WEIGHT: 177 LBS

## 2025-01-08 DIAGNOSIS — M25.552 BILATERAL HIP PAIN: ICD-10-CM

## 2025-01-08 DIAGNOSIS — R41.840 ATTENTION AND CONCENTRATION DEFICIT: ICD-10-CM

## 2025-01-08 DIAGNOSIS — M25.551 BILATERAL HIP PAIN: ICD-10-CM

## 2025-01-08 DIAGNOSIS — Z00.00 PHYSICAL EXAM, ANNUAL: Primary | ICD-10-CM

## 2025-01-08 DIAGNOSIS — F33.41 RECURRENT MAJOR DEPRESSION IN PARTIAL REMISSION: ICD-10-CM

## 2025-01-08 DIAGNOSIS — F17.200 NICOTINE USE DISORDER: ICD-10-CM

## 2025-01-08 DIAGNOSIS — F41.1 ANXIETY STATE: ICD-10-CM

## 2025-01-08 DIAGNOSIS — Z63.6 CAREGIVER STRESS: ICD-10-CM

## 2025-01-08 DIAGNOSIS — Z86.711 HISTORY OF PULMONARY EMBOLISM: ICD-10-CM

## 2025-01-08 DIAGNOSIS — K58.9 IRRITABLE BOWEL SYNDROME, UNSPECIFIED TYPE: ICD-10-CM

## 2025-01-08 DIAGNOSIS — Z87.891 PERSONAL HISTORY OF TOBACCO USE: ICD-10-CM

## 2025-01-08 DIAGNOSIS — M79.2 NERVE PAIN: ICD-10-CM

## 2025-01-08 DIAGNOSIS — E66.811 CLASS 1 OBESITY WITHOUT SERIOUS COMORBIDITY WITH BODY MASS INDEX (BMI) OF 34.0 TO 34.9 IN ADULT, UNSPECIFIED OBESITY TYPE: ICD-10-CM

## 2025-01-08 LAB
ALBUMIN SERPL BCG-MCNC: 4.7 G/DL (ref 3.5–5.2)
ALP SERPL-CCNC: 73 U/L (ref 40–150)
ALT SERPL W P-5'-P-CCNC: 27 U/L (ref 0–50)
ANION GAP SERPL CALCULATED.3IONS-SCNC: 11 MMOL/L (ref 7–15)
AST SERPL W P-5'-P-CCNC: 30 U/L (ref 0–45)
BILIRUB SERPL-MCNC: 0.2 MG/DL
BUN SERPL-MCNC: 7.5 MG/DL (ref 6–20)
CALCIUM SERPL-MCNC: 9.6 MG/DL (ref 8.8–10.4)
CHLORIDE SERPL-SCNC: 103 MMOL/L (ref 98–107)
CHOLEST SERPL-MCNC: 200 MG/DL
CREAT SERPL-MCNC: 0.8 MG/DL (ref 0.51–0.95)
EGFRCR SERPLBLD CKD-EPI 2021: 89 ML/MIN/1.73M2
EST. AVERAGE GLUCOSE BLD GHB EST-MCNC: 100 MG/DL
FASTING STATUS PATIENT QL REPORTED: NO
FASTING STATUS PATIENT QL REPORTED: NO
GLUCOSE SERPL-MCNC: 99 MG/DL (ref 70–99)
HBA1C MFR BLD: 5.1 %
HCO3 SERPL-SCNC: 26 MMOL/L (ref 22–29)
HDLC SERPL-MCNC: 66 MG/DL
LDLC SERPL CALC-MCNC: 120 MG/DL
NONHDLC SERPL-MCNC: 134 MG/DL
POTASSIUM SERPL-SCNC: 4.3 MMOL/L (ref 3.4–5.3)
PROT SERPL-MCNC: 7.3 G/DL (ref 6.4–8.3)
SODIUM SERPL-SCNC: 140 MMOL/L (ref 135–145)
TRIGL SERPL-MCNC: 68 MG/DL

## 2025-01-08 PROCEDURE — 84155 ASSAY OF PROTEIN SERUM: CPT | Mod: ZL | Performed by: FAMILY MEDICINE

## 2025-01-08 PROCEDURE — 82465 ASSAY BLD/SERUM CHOLESTEROL: CPT | Mod: ZL | Performed by: FAMILY MEDICINE

## 2025-01-08 PROCEDURE — 83036 HEMOGLOBIN GLYCOSYLATED A1C: CPT | Mod: ZL | Performed by: FAMILY MEDICINE

## 2025-01-08 PROCEDURE — 84460 ALANINE AMINO (ALT) (SGPT): CPT | Mod: ZL | Performed by: FAMILY MEDICINE

## 2025-01-08 PROCEDURE — 36415 COLL VENOUS BLD VENIPUNCTURE: CPT | Mod: ZL | Performed by: FAMILY MEDICINE

## 2025-01-08 PROCEDURE — 73522 X-RAY EXAM HIPS BI 3-4 VIEWS: CPT

## 2025-01-08 RX ORDER — TIRZEPATIDE 2.5 MG/.5ML
2.5 INJECTION, SOLUTION SUBCUTANEOUS
Qty: 0.5 ML | Refills: 3 | Status: SHIPPED | OUTPATIENT
Start: 2025-01-08

## 2025-01-08 RX ORDER — LISDEXAMFETAMINE DIMESYLATE 60 MG/1
60 CAPSULE ORAL DAILY
Qty: 30 CAPSULE | Refills: 0 | Status: SHIPPED | OUTPATIENT
Start: 2025-01-27 | End: 2025-02-26

## 2025-01-08 RX ORDER — MULTIVITAMIN WITH IRON
1 TABLET ORAL DAILY
COMMUNITY
Start: 2025-01-08 | End: 2025-01-08

## 2025-01-08 RX ORDER — LISDEXAMFETAMINE DIMESYLATE 60 MG/1
60 CAPSULE ORAL DAILY
Qty: 30 CAPSULE | Refills: 0 | Status: SHIPPED | OUTPATIENT
Start: 2025-03-09 | End: 2025-04-08

## 2025-01-08 RX ORDER — TRAZODONE HYDROCHLORIDE 100 MG/1
TABLET ORAL
Qty: 360 TABLET | Refills: 4 | Status: SHIPPED | OUTPATIENT
Start: 2025-01-08

## 2025-01-08 RX ORDER — GABAPENTIN 300 MG/1
300 CAPSULE ORAL 3 TIMES DAILY
Qty: 90 CAPSULE | Refills: 11 | Status: SHIPPED | OUTPATIENT
Start: 2025-01-08

## 2025-01-08 RX ORDER — FLUOXETINE 40 MG/1
40 CAPSULE ORAL DAILY
Qty: 90 CAPSULE | Refills: 3 | Status: SHIPPED | OUTPATIENT
Start: 2025-01-08

## 2025-01-08 RX ORDER — LISDEXAMFETAMINE DIMESYLATE 60 MG/1
60 CAPSULE ORAL DAILY
Qty: 30 CAPSULE | Refills: 0 | Status: SHIPPED | OUTPATIENT
Start: 2025-02-07 | End: 2025-03-09

## 2025-01-08 SDOH — SOCIAL STABILITY - SOCIAL INSECURITY: DEPENDENT RELATIVE NEEDING CARE AT HOME: Z63.6

## 2025-01-08 ASSESSMENT — PAIN SCALES - GENERAL: PAINLEVEL_OUTOF10: SEVERE PAIN (7)

## 2025-01-08 NOTE — LETTER
Opioid / Opioid Plus Controlled Substance Agreement    This is an agreement between you and your provider about the safe and appropriate use of controlled substance/opioids prescribed by your care team. Controlled substances are medicines that can cause physical and mental dependence (abuse).    There are strict laws about having and using these medicines. We here at [facility] are committing to working with you in your efforts to get better. To support you in this work, we ll help you schedule regular office appointments for medicine refills. If we must cancel or change your appointment for any reason, we ll make sure you have enough medicine to last until your next appointment.     As a Provider, I will:  Listen carefully to your concerns and treat you with respect.   Recommend a treatment plan that I believe is in your best interest. This plan may involve therapies other than opioid pain medication.   Talk with you often about the possible benefits, and the risk of harm of any medicine that we prescribe for you.   Provide a plan on how to taper (discontinue or go off) using this medicine if the decision is made to stop its use.    As a Patient, I understand that opioid(s):   Are a controlled substance prescribed by my care team to help me function or work and manage my condition(s).   Are strong medicines and can cause serious side effects such as:  Drowsiness, which can seriously affect my driving ability  A lower breathing rate, enough to cause death  Harm to my thinking ability   Depression   Abuse of and addiction to this medicine  Need to be taken exactly as prescribed. Combining opioids with certain medicines or chemicals (such as illegal drugs, sedatives, sleeping pills, and benzodiazepines) can be dangerous or even fatal. If I stop opioids suddenly, I may have severe withdrawal symptoms.  Do not work for all types of pain nor for all patients. If they re not helpful, I may be asked to stop  them.        The risks, benefits and side effects of these medicine(s) were explained to me. I agree that:  I will take part in other treatments as advised by my care team. This may be psychiatry or counseling, physical therapy, behavioral therapy, group treatment or a referral to a specialist.     I will keep all my appointments. I understand that this is part of the monitoring of opioids. My care team may require an office visit for EVERY opioid/controlled substance refill. If I miss appointments or don t follow instructions, my care team may stop my medicine.    I will take my medicines as prescribed. I will not change the dose or schedule unless my care team tells me to. There will be no refills if I run out early.     I may be asked to come to the clinic and complete a urine drug test or complete a pill count at any time. If I don t give a urine sample or participate in a pill count, the care team may stop my medicine.    I will only receive prescriptions from this clinic for chronic pain. If I am treated by another provider for acute pain issues, I will tell them that I am taking opioid pain medication for chronic pain and that I have a treatment agreement with this provider. I will inform my [facility] care team within one business day if I am given a prescription for any pain medication by another healthcare provider. My [facility] care team can contact other providers and pharmacists about my use of any medicines.    It is up to me to make sure that I don t run out of my medicines on weekends or holidays. If my care team is willing to refill my opioid prescription without a visit, I must request refills only during office hours. Refills may take up to 3 business days to process. I will use one pharmacy to fill all my opioid and other controlled substance prescriptions. I will notify the clinic about any changes to my insurance or medication availability.    I am responsible for my  prescriptions. If the medicine/prescription is lost, stolen or destroyed, it will not be replaced. I also agree not to share controlled substance medicines with anyone.    I am aware I should not use any illegal or recreational drugs. I agree not to drink alcohol unless my care team says I can.       If I enroll in the [place] [facility] program, I will tell my care team prior to my next refill.     I will tell my care team right away if I become pregnant, have a new medical problem treated outside of my regular clinic, or have a change in my medications.    I understand that this medicine can affect my thinking, judgment and reaction time. Alcohol and drugs affect the brain and body, which can affect the safety of my driving. Being under the influence of alcohol or drugs can affect my decision-making, behaviors, personal safety, and the safety of others. Driving while impaired (DWI) can occur if a person is driving, operating, or in physical control of a car, motorcycle, boat, snowmobile, ATV, motorbike, off-road vehicle, or any other motor vehicle ([place] Statute 169A.20). I understand the risk if I choose to drive or operate any vehicle or machinery.    I understand that if I do not follow any of the conditions above, my prescriptions or treatment may be stopped or changed.          Opioids  What You Need to Know    What are opioids?   Opioids are pain medicines that must be prescribed by a doctor. They are also known as narcotics.     Examples are:   morphine (MS Contin, Kailey)  oxycodone (Oxycontin)  oxycodone and acetaminophen (Percocet)  hydrocodone and acetaminophen (Vicodin, Norco)   fentanyl patch (Duragesic)   hydromorphone (Dilaudid)   methadone  codeine (Tylenol #3)     What do opioids do well?   Opioids are best for severe short-term pain such as after a surgery or injury. They may work well for cancer pain. They may help some people with long-lasting (chronic) pain.     What do opioids NOT do  well?   Opioids never get rid of pain entirely, and they don t work well for most patients with chronic pain. Opioids don t reduce swelling, one of the causes of pain.                                    Other ways to manage chronic pain and improve function include:     Treat the health problem that may be causing pain  Anti-inflammation medicines, which reduce swelling and tenderness, such as ibuprofen (Advil, Motrin) or naproxen (Aleve)  Acetaminophen (Tylenol)  Antidepressants and anti-seizure medicines, especially for nerve pain  Topical treatments such as patches or creams  Injections or nerve blocks  Chiropractic or osteopathic treatment  Acupuncture, massage, deep breathing, meditation, visual imagery, aromatherapy  Use heat or ice at the pain site  Physical therapy   Exercise  Stop smoking  Take part in therapy       Risks and side effects     Talk to your doctor before you start or decide to keep taking opioids. Possible side effects include:    Lowering your breathing rate enough to cause death  Overdose, including death, especially if taking higher than prescribed doses  Worse depression symptoms; less pleasure in things you usually enjoy  Feeling tired or sluggish  Slower thoughts or cloudy thinking  Being more sensitive to pain over time; pain is harder to control  Trouble sleeping or restless sleep  Changes in hormone levels (for example, less testosterone)  Changes in sex drive or ability to have sex  Constipation  Unsafe driving  Itching and sweating  Dizziness  Nausea, throwing up and dry mouth    What else should I know about opioids?    Opioids may lead to dependence, tolerance, or addiction.    Dependence means that if you stop or reduce the medicine too quickly, you will have withdrawal symptoms. These include loose poop (diarrhea), jitters, flu-like symptoms, nervousness and tremors. Dependence is not the same as addiction.                     Tolerance means needing higher doses over time to  get the same effect. This may increase the chance of serious side effects.    Addiction is when people improperly use a substance that harms their body, their mind or their relations with others. Use of opiates can cause a relapse of addiction if you have a history of drug or alcohol abuse.    People who have used opioids for a long time may have a lower quality of life, worse depression, higher levels of pain and more visits to doctors.    You can overdose on opioids. Take these steps to lower your risk of overdose:    Recognize the signs:  Signs of overdose include decrease or loss of consciousness (blackout), slowed breathing, trouble waking up and blue lips. If someone is worried about overdose, they should call 911.    Talk to your doctor about Narcan (naloxone).   If you are at risk for overdose, you may be given a prescription for Narcan. This medicine very quickly reverses the effects of opioids.   If you overdose, a friend or family member can give you Narcan while waiting for the ambulance. They need to know the signs of overdose and how to give Narcan.     Don't use alcohol or street drugs.   Taking them with opioids can cause death.    Do not take any of these medicines unless your doctor says it s OK. Taking these with opioids can cause death:  Benzodiazepines, such as lorazepam (Ativan), alprazolam (Xanax) or diazepam (Valium)  Muscle relaxers, such as cyclobenzaprine (Flexeril)  Sleeping pills like zolpidem (Ambien)   Other opioids      How to keep you and other people safe while taking opioids:    Never share your opioids with others.  Opioid medicines are regulated by the Drug Enforcement Agency (LEE). Selling or sharing medications is a criminal act.    2. Be sure to store opioids in a secure place, locked up if possible. Young children can easily swallow them and overdose.    3. When you are traveling with your medicines, keep them in the original bottles. If you use a pill box, be sure you also  carry a copy of your medicine list from your clinic or pharmacy.    4. Safe disposal of opioids    Most pharmacies have places to get rid of medicine, called disposal kiosks. Medicine disposal options are also available in every [facility]. Search your county and  medication disposal  to find more options. You can find more details at:  [email or web address]://[email or web address]-green/managing-unwanted-medications     I agree that my provider, clinic care team, and pharmacy may work with any city, state or federal law enforcement agency that investigates the misuse, sale, or other diversion of my controlled medicine. I will allow my provider to discuss my care with, or share a copy of, this agreement with any other treating provider, pharmacy or emergency room where I receive care.    I have read this agreement and have asked questions about anything I did not understand.    _______________________________________________________  Patient Signature - Radha Ruiz _____________________                   Date     _______________________________________________________  Provider Signature - TRAVIS TREVINO MD   _____________________                   Date     _______________________________________________________  Witness Signature (required if provider not present while patient signing)   _____________________                   Date

## 2025-01-08 NOTE — NURSING NOTE
"Chief Complaint   Patient presents with    Physical     Annual well visit       Initial /78 (BP Location: Right arm, Patient Position: Sitting, Cuff Size: Adult Regular)   Pulse 94   Temp 96.9  F (36.1  C) (Temporal)   Resp 14   Ht 1.537 m (5' 0.5\")   Wt 80.3 kg (177 lb)   LMP  (LMP Unknown)   SpO2 99%   Breastfeeding No   BMI 34.00 kg/m   Estimated body mass index is 34 kg/m  as calculated from the following:    Height as of this encounter: 1.537 m (5' 0.5\").    Weight as of this encounter: 80.3 kg (177 lb).    Medication Review: complete    The next two questions are to help us understand your food security.  If you are feeling you need any assistance in this area, we have resources available to support you today.          1/7/2025   SDOH- Food Insecurity   Within the past 12 months, did you worry that your food would run out before you got money to buy more? N   Within the past 12 months, did the food you bought just not last and you didn t have money to get more? N     Health Care Directive:  Patient does not have a Health Care Directive: Discussed advance care planning with patient; however, patient declined at this time.    Sarah Andrade LPN      "

## 2025-01-08 NOTE — PROGRESS NOTES
Preventive Care Visit  [facility]  TRAVIS TREVINO MD, Family Medicine  Jan 8, 2025  {Provider  Link to SmartSet :682105}    {PROVIDER CHARTING PREFERENCE:340738}    Mike Garcia is a 49 year old, presenting for the following:  Physical (Annual well visit)        1/8/2025     1:21 PM   Additional Questions   Roomed by Sarah DOLAN LPN          HPI  ***  {MA/LPN/RN Pre-Provider Visit Orders- hCG/UA/Strep (Optional):483771}  {SUPERLIST (Optional):120912}  {additonal problems for provider to add (Optional):394370}  Health Care Directive  Patient does not have a Health Care Directive: Discussed advance care planning with patient; however, patient declined at this time.      1/7/2025   General Health   How would you rate your overall physical health? (!) FAIR   Feel stress (tense, anxious, or unable to sleep) To some extent   (!) STRESS CONCERN      1/7/2025   Nutrition   Three or more servings of calcium each day? Yes   Diet: Other   If other, please elaborate: Fast from 8pm until 10am   How many servings of fruit and vegetables per day? (!) 2-3   How many sweetened beverages each day? 0-1         1/7/2025   Exercise   Days per week of moderate/strenous exercise 3 days   Average minutes spent exercising at this level 40 min         1/7/2025   Social Factors   Frequency of gathering with friends or relatives Patient declined   Worry food won't last until get money to buy more No   Food not last or not have enough money for food? No   Do you have housing? (Housing is defined as stable permanent housing and does not include staying ouside in a car, in a tent, in an abandoned building, in an overnight shelter, or couch-surfing.) Yes   Are you worried about losing your housing? No   Lack of transportation? No   Unable to get utilities (heat,electricity)? No         1/7/2025   Fall Risk   Fallen 2 or more times in the past year? No   Trouble with walking or balance? No          1/7/2025    Dental   Dentist two times every year? Yes         1/7/2025   TB Screening   Were you born outside of the [place]? No       Today's PHQ-9 Score:       1/7/2025     4:45 PM   PHQ-9 SCORE   PHQ-9 Total Score MyChart 4 (Minimal depression)   PHQ-9 Total Score 4        Patient-reported         1/7/2025   Substance Use   If I could quit smoking, I would Somewhat disagree   I want to quit somking, worry about health affects Somewhat disagree   Willing to make a plan to quit smoking Somewhat disagree   Willing to cut down before quitting Neutral   Alcohol more than 3/day or more than 7/wk No   Do you use any other substances recreationally? No     Social History     Tobacco Use    Smoking status: Every Day     Current packs/day: 0.25     Average packs/day: 0.3 packs/day for 30.0 years (7.5 ttl pk-yrs)     Types: Cigarettes     Passive exposure: Current    Smokeless tobacco: Never    Tobacco comments:     Cutting back   Vaping Use    Vaping status: Never Used   Substance Use Topics    Alcohol use: Yes     Alcohol/week: 4.0 - 5.0 standard drinks of alcohol     Types: 4 - 5 Shots of liquor per week    Drug use: No     Comment: Drug use: No     {Provider  If there are gaps in the social history shown above, please follow the link to update and then refresh the note Link to Social and Substance History :582614}      1/29/2024   LAST FHS-7 RESULTS   1st degree relative breast or ovarian cancer No   Any relative bilateral breast cancer No   Any male have breast cancer No   Any ONE woman have BOTH breast AND ovarian cancer No   Any woman with breast cancer before 50yrs No   2 or more relatives with breast AND/OR ovarian cancer No   2 or more relatives with breast AND/OR bowel cancer No     {If any of the questions to the FHS7 are answered yes, consider referral for genetic counseling.    Additional indications for genetic referral include personal history of breast or ovarian cancer, genetic mutation in 1st degree relative which  "increases risk of breast cancer including BRCA1, BRCA2, JOHANNA, PALB 2, TP53, CHEK2, PTEN, CDH1, STK11 (per ACS) and/or 1st degree relative with history of pancreatic or high-risk prostate cancer (per NCCN):138913}   {Mammogram Decision Support (Optional):709391}          1/7/2025   One time HIV Screening   Previous HIV test? No         1/7/2025   STI Screening   New sexual partner(s) since last STI/HIV test? No     History of abnormal Pap smear: { :575769}       ASCVD Risk   The 10-year ASCVD risk score (Emory LAMBERT, et al., 2019) is: 2.7%    Values used to calculate the score:      Age: 49 years      Sex: Female      Is Non- : No      Diabetic: No      Tobacco smoker: Yes      Systolic Blood Pressure: 120 mmHg      Is BP treated: No      HDL Cholesterol: 64 mg/dL      Total Cholesterol: 195 mg/dL    {Link to Fracture Risk Assessment Tool (Optional):874592}    {Provider  REQUIRED FOR AWV Use the storyboard to review patient history, after sections have been marked as reviewed, refresh note to capture documentation:012540}   Reviewed and updated as needed this visit by Provider                    {HISTORY OPTIONS (Optional):685159}    {ROS Picklists (Optional):487177}     Objective    Exam  /78 (BP Location: Right arm, Patient Position: Sitting, Cuff Size: Adult Regular)   Pulse 94   Temp 96.9  F (36.1  C) (Temporal)   Resp 14   Ht 1.537 m (5' 0.5\")   Wt 80.3 kg (177 lb)   LMP  (LMP Unknown)   SpO2 99%   Breastfeeding No   BMI 34.00 kg/m     Estimated body mass index is 34 kg/m  as calculated from the following:    Height as of this encounter: 1.537 m (5' 0.5\").    Weight as of this encounter: 80.3 kg (177 lb).    Physical Exam  {Exam Choices (Optional):373397}        Signed Electronically by: TRAVIS TREVINO MD  {Email feedback regarding this note to [email or web address]   :498721}  Answers submitted by the patient for this " visit:  Patient Health Questionnaire (Submitted on 1/7/2025)  If you checked off any problems, how difficult have these problems made it for you to do your work, take care of things at home, or get along with other people?: Somewhat difficult  PHQ9 TOTAL SCORE: 4

## 2025-02-02 ENCOUNTER — MYC REFILL (OUTPATIENT)
Dept: FAMILY MEDICINE | Facility: OTHER | Age: 50
End: 2025-02-02
Payer: COMMERCIAL

## 2025-02-02 DIAGNOSIS — R41.840 ATTENTION AND CONCENTRATION DEFICIT: ICD-10-CM

## 2025-02-02 DIAGNOSIS — M79.2 NERVE PAIN: ICD-10-CM

## 2025-02-04 RX ORDER — GABAPENTIN 300 MG/1
300 CAPSULE ORAL 3 TIMES DAILY
Qty: 90 CAPSULE | Refills: 11 | OUTPATIENT
Start: 2025-02-04

## 2025-02-04 RX ORDER — LISDEXAMFETAMINE DIMESYLATE 60 MG/1
60 CAPSULE ORAL DAILY
Qty: 30 CAPSULE | Refills: 0 | Status: SHIPPED | OUTPATIENT
Start: 2025-02-04

## 2025-02-04 NOTE — TELEPHONE ENCOUNTER
Please check that refills at [facility]  are cancelled.    Kari Alvarado MD     PDMP Review         Value Time User    State PDMP site checked  Yes 2/4/2025  2:19 PM Kari Dietz MD

## 2025-02-04 NOTE — TELEPHONE ENCOUNTER
[facility] sent Rx request for the following:      Requested Prescriptions   Pending Prescriptions Disp Refills    lisdexamfetamine (VYVANSE) 60 MG capsule 30 capsule 0     Sig: Take 1 capsule (60 mg) by mouth daily.       There is no refill protocol information for this order          Last Prescription Date:   2/7/25  Last Fill Qty/Refills:         30, R-0    Last Office Visit:              1/8/25   Future Office visit:             Next 5 appointments (look out 90 days)      Apr 07, 2025 1:40 PM  (Arrive by 1:25 PM)  Provider Visit with Kari Merino MD  [facility] ([facility] [address]  [place] [id number] [phone]           Patient is unable to receive prescription at [facility], asking for prescription to be resent to [facility] pharmacy.    Routing refill request to provider for review/approval because:  Drug not on the [facility] refill protocol     Blanquita Hanks RN on 2/4/2025 at 1:51 PM

## 2025-02-04 NOTE — TELEPHONE ENCOUNTER
[facility] sent Rx request for the following:       Disp Refills Start End [facility]   gabapentin (NEURONTIN) 300 MG capsule 90 capsule 11 1/8/2025 -- No   Sig - Route: Take 1 capsule (300 mg) by mouth 3 times daily. - Oral   Sent to pharmacy as: Gabapentin 300 MG Oral Capsule (NEURONTIN)   Class: E-Prescribe   Order: [id number]   E-Prescribing Status: Receipt confirmed by pharmacy (1/8/2025  1:56 PM CST)     [id number]        Duplicate    Blanquita Hanks RN on 2/4/2025 at 1:04 PM

## 2025-04-06 ASSESSMENT — ANXIETY QUESTIONNAIRES
6. BECOMING EASILY ANNOYED OR IRRITABLE: SEVERAL DAYS
GAD7 TOTAL SCORE: 2
7. FEELING AFRAID AS IF SOMETHING AWFUL MIGHT HAPPEN: NOT AT ALL
1. FEELING NERVOUS, ANXIOUS, OR ON EDGE: SEVERAL DAYS
7. FEELING AFRAID AS IF SOMETHING AWFUL MIGHT HAPPEN: NOT AT ALL
8. IF YOU CHECKED OFF ANY PROBLEMS, HOW DIFFICULT HAVE THESE MADE IT FOR YOU TO DO YOUR WORK, TAKE CARE OF THINGS AT HOME, OR GET ALONG WITH OTHER PEOPLE?: NOT DIFFICULT AT ALL
GAD7 TOTAL SCORE: 2
GAD7 TOTAL SCORE: 2
4. TROUBLE RELAXING: NOT AT ALL
IF YOU CHECKED OFF ANY PROBLEMS ON THIS QUESTIONNAIRE, HOW DIFFICULT HAVE THESE PROBLEMS MADE IT FOR YOU TO DO YOUR WORK, TAKE CARE OF THINGS AT HOME, OR GET ALONG WITH OTHER PEOPLE: NOT DIFFICULT AT ALL
2. NOT BEING ABLE TO STOP OR CONTROL WORRYING: NOT AT ALL
3. WORRYING TOO MUCH ABOUT DIFFERENT THINGS: NOT AT ALL
5. BEING SO RESTLESS THAT IT IS HARD TO SIT STILL: NOT AT ALL

## 2025-04-06 ASSESSMENT — PATIENT HEALTH QUESTIONNAIRE - PHQ9
10. IF YOU CHECKED OFF ANY PROBLEMS, HOW DIFFICULT HAVE THESE PROBLEMS MADE IT FOR YOU TO DO YOUR WORK, TAKE CARE OF THINGS AT HOME, OR GET ALONG WITH OTHER PEOPLE: SOMEWHAT DIFFICULT
SUM OF ALL RESPONSES TO PHQ QUESTIONS 1-9: 5
SUM OF ALL RESPONSES TO PHQ QUESTIONS 1-9: 5

## 2025-04-07 ENCOUNTER — OFFICE VISIT (OUTPATIENT)
Dept: FAMILY MEDICINE | Facility: OTHER | Age: 50
End: 2025-04-07
Attending: FAMILY MEDICINE
Payer: COMMERCIAL

## 2025-04-07 VITALS
SYSTOLIC BLOOD PRESSURE: 120 MMHG | WEIGHT: 173 LBS | RESPIRATION RATE: 14 BRPM | DIASTOLIC BLOOD PRESSURE: 80 MMHG | OXYGEN SATURATION: 99 % | BODY MASS INDEX: 32.66 KG/M2 | HEIGHT: 61 IN | HEART RATE: 90 BPM | TEMPERATURE: 96.8 F

## 2025-04-07 DIAGNOSIS — R92.8 ABNORMAL FINDING ON BREAST IMAGING: ICD-10-CM

## 2025-04-07 DIAGNOSIS — F17.200 NICOTINE USE DISORDER: ICD-10-CM

## 2025-04-07 DIAGNOSIS — F41.1 ANXIETY STATE: ICD-10-CM

## 2025-04-07 DIAGNOSIS — R41.840 ATTENTION AND CONCENTRATION DEFICIT: Primary | ICD-10-CM

## 2025-04-07 DIAGNOSIS — J30.9 ALLERGIC RHINITIS, UNSPECIFIED SEASONALITY, UNSPECIFIED TRIGGER: ICD-10-CM

## 2025-04-07 RX ORDER — LISDEXAMFETAMINE DIMESYLATE 60 MG/1
60 CAPSULE ORAL DAILY
Qty: 30 CAPSULE | Refills: 0 | Status: SHIPPED | OUTPATIENT
Start: 2025-04-07 | End: 2025-05-07

## 2025-04-07 RX ORDER — LISDEXAMFETAMINE DIMESYLATE 60 MG/1
60 CAPSULE ORAL DAILY
Qty: 30 CAPSULE | Refills: 0 | Status: SHIPPED | OUTPATIENT
Start: 2025-06-06 | End: 2025-07-06

## 2025-04-07 RX ORDER — LISDEXAMFETAMINE DIMESYLATE 60 MG/1
60 CAPSULE ORAL DAILY
Qty: 30 CAPSULE | Refills: 0 | Status: SHIPPED | OUTPATIENT
Start: 2025-05-07 | End: 2025-06-06

## 2025-04-07 RX ORDER — CETIRIZINE HYDROCHLORIDE, PSEUDOEPHEDRINE HYDROCHLORIDE 5; 120 MG/1; MG/1
1 TABLET, FILM COATED, EXTENDED RELEASE ORAL 2 TIMES DAILY
Qty: 60 TABLET | Refills: 5 | Status: SHIPPED | OUTPATIENT
Start: 2025-04-07

## 2025-04-07 ASSESSMENT — PAIN SCALES - GENERAL: PAINLEVEL_OUTOF10: MODERATE PAIN (5)

## 2025-04-07 NOTE — PROGRESS NOTES
"    Assessment & Plan       ICD-10-CM    1. Attention and concentration deficit  R41.840 lisdexamfetamine (VYVANSE) 60 MG capsule     lisdexamfetamine (VYVANSE) 60 MG capsule     lisdexamfetamine (VYVANSE) 60 MG capsule      2. Anxiety state  F41.1 lisdexamfetamine (VYVANSE) 60 MG capsule     lisdexamfetamine (VYVANSE) 60 MG capsule     lisdexamfetamine (VYVANSE) 60 MG capsule      3. Allergic rhinitis, unspecified seasonality, unspecified trigger  J30.9 cetirizine-pseudoePHEDrine ER (ZYRTEC-D) 5-120 MG 12 hr tablet      4. Nicotine use disorder  F17.200       5. Abnormal finding on breast imaging  R92.8            Contract updated 1/2025, tox 2024  - continue with same dose of of vyvanse.   reviewed.   Discussed her use of semaglutide  - side effects of this discussed   Continue to work on smoking cessation  Mammogram reminder - to schedule on her way out today.      PDMP Review         Value Time User    State PDMP site checked  Yes 4/7/2025  2:01 PM Kari Dietz MD                   The longitudinal plan of care was addressed during this visit. Due to the added complexity in care, I will continue to support Radha Ruiz in the subsequent management of this condition(s) and with the ongoing continuity of care of this condition(s).        BMI  Estimated body mass index is 33.23 kg/m  as calculated from the following:    Height as of this encounter: 1.537 m (5' 0.5\").    Weight as of this encounter: 78.5 kg (173 lb).   See above       Return in about 3 months (around 7/7/2025).    KARI TREVINO MD  [facility]    Subjective   Radha Ruiz is a 49 year old female  presenting for the following health issues: Nursing Notes:   Sarah Andrade LPN  4/7/2025  1:50 PM  Signed  Chief Complaint   Patient presents with    Recheck Medication     Weight loss medication - 3 months       Initial /80 (BP Location: Right arm, Patient Position: Sitting, Cuff Size: Adult " "Regular)   Pulse 90   Temp 96.8  F (36  C) (Temporal)   Resp 14   Ht 1.537 m (5' 0.5\")   Wt 78.5 kg (173 lb)   LMP  (LMP Unknown)   SpO2 99%   BMI 33.23 kg/m   Estimated body mass index is 33.23 kg/m  as calculated from the following:    Height as of this encounter: 1.537 m (5' 0.5\").    Weight as of this encounter: 78.5 kg (173 lb).    Medication Review: complete    The next two questions are to help us understand your food security.  If you are feeling you need any assistance in this area, we have resources available to support you today.          1/7/2025   SDOH- Food Insecurity   Within the past 12 months, did you worry that your food would run out before you got money to buy more? N   Within the past 12 months, did the food you bought just not last and you didn t have money to get more? N         Health Care Directive:  Patient does not have a Health Care Directive: Discussed advance care planning with patient; however, patient declined at this time.    Sarah Andrade LPN                                 HPI Radhavishnu Ruiz is a 49 year old female presents for medication follow up.    She is on vyvanse 60mg a day.    Stressors - mom's health and moving to assisted living.     Linzess - taking for chronic constipation      Weight loss management - semaglutide ordered though [facility], now at 45u, this is her third dose as part of the titration.  Has constipation as side effects, more cognizant of her eating.    Wt Readings from Last 4 Encounters:   04/07/25 78.5 kg (173 lb)   01/08/25 80.3 kg (177 lb)   10/30/24 81.6 kg (180 lb)   08/27/24 80.7 kg (178 lb)     Nicotine  - notices that she is smoking less, 6 boxes now lasts her 2.5 weeks, used to be at 1/2ppd     Answers submitted by the patient for this visit:  Patient Health Questionnaire (Submitted on 4/6/2025)  If you checked off any problems, how difficult have these problems made it for you to do your work, take care of things at home, or get along with " other people?: Somewhat difficult  PHQ9 TOTAL SCORE: 5  Patient Health Questionnaire (G7) (Submitted on 4/6/2025)  ERMELINDA 7 TOTAL SCORE: 2  Depression / Anxiety Questionnaire (Submitted on 4/6/2025)  Chief Complaint: Chronic problems general questions HPI Form  Depression/Anxiety: Depression & Anxiety  Depression & Anxiety (Submitted on 4/6/2025)  Chief Complaint: Chronic problems general questions HPI Form  Status since last visit:: good  Anxiety since last: : better  Other associated symptoms of depression:: No  Other associated symotome: : No  Significant life event: : No  Anxious:: No  Current substance use:: No  General Questionnaire (Submitted on 4/6/2025)  Chief Complaint: Chronic problems general questions HPI Form  What is the reason for your visit today? : Health check  How many servings of fruits and vegetables do you eat daily?: 2-3  On average, how many sweetened beverages do you drink each day (Examples: soda, juice, sweet tea, etc.  Do NOT count diet or artificially sweetened beverages)?: 0  How many minutes a day do you exercise enough to make your heart beat faster?: 30 to 60  How many days a week do you exercise enough to make your heart beat faster?: 5  How many days per week do you miss taking your medication?: 0  Questionnaire about: Chronic problems general questions HPI Form (Submitted on 4/6/2025)  Chief Complaint: Chronic problems general questions HPI Form      Current Outpatient Medications   Medication Sig Dispense Refill    cetirizine-pseudoePHEDrine ER (ZYRTEC-D) 5-120 MG 12 hr tablet Take 1 tablet by mouth 2 times daily. 60 tablet 5    FLUoxetine (PROZAC) 40 MG capsule Take 1 capsule (40 mg) by mouth daily. 90 capsule 3    gabapentin (NEURONTIN) 300 MG capsule Take 1 capsule (300 mg) by mouth 3 times daily. 90 capsule 11    linaclotide (LINZESS) 72 MCG capsule Take 1 capsule (72 mcg) by mouth once as needed 90 capsule 11    lisdexamfetamine (VYVANSE) 60 MG capsule Take 1 capsule (60 mg)  "by mouth daily. 30 capsule 0    [START ON 2025] lisdexamfetamine (VYVANSE) 60 MG capsule Take 1 capsule (60 mg) by mouth daily. 30 capsule 0    [START ON 2025] lisdexamfetamine (VYVANSE) 60 MG capsule Take 1 capsule (60 mg) by mouth daily. 30 capsule 0    lisdexamfetamine (VYVANSE) 60 MG capsule Take 1 capsule (60 mg) by mouth daily. 30 capsule 0    lisdexamfetamine (VYVANSE) 60 MG capsule Take 1 capsule (60 mg) by mouth daily. 30 capsule 0    magnesium 100 MG TABS Take 150 mg by mouth 2 times daily. 150 mg tablet 2x daily      semaglutide-weight management (WEGOVY) 0.25 MG/0.5ML pen Inject 0.5 mLs (0.25 mg) subcutaneously once a week.      traZODone (DESYREL) 100 MG tablet TAKE 3 TO 4 TABLETS BY MOUTH EVERY NIGHT AT BEDTIME 360 tablet 4     No current facility-administered medications for this visit.     Past Medical History:   Diagnosis Date    ADHD (attention deficit hyperactivity disorder)     Blood Transfusion with  10/2000    10/00,History of blood transfusion with     ERMELINDA (generalized anxiety disorder)     Other pulmonary embolism without acute cor pulmonale (H)     History of pulmonary emboli after     Personal history of other medical treatment (CODE)      2, para 1-0-1-1               Review of Systems           2024     4:08 PM 2025     4:45 PM 2025     2:28 PM   PHQ   PHQ-9 Total Score 6 4  5    Q9: Thoughts of better off dead/self-harm past 2 weeks Not at all Not at all Not at all       Patient-reported         2024     9:36 AM 10/29/2024     2:40 PM 2025     2:29 PM   ERMELINDA-7 SCORE   Total Score 8 (mild anxiety) 4 (minimal anxiety) 2 (minimal anxiety)   Total Score 8 4  2        Patient-reported             Objective  /80 (BP Location: Right arm, Patient Position: Sitting, Cuff Size: Adult Regular)   Pulse 90   Temp 96.8  F (36  C) (Temporal)   Resp 14   Ht 1.537 m (5' 0.5\")   Wt 78.5 kg (173 lb)   LMP  (LMP Unknown)   SpO2 99%   " BMI 33.23 kg/m     Physical Exam   GENERAL: alert and no distress  CV: RRR

## 2025-04-07 NOTE — NURSING NOTE
"Chief Complaint   Patient presents with    Recheck Medication     Weight loss medication - 3 months       Initial /80 (BP Location: Right arm, Patient Position: Sitting, Cuff Size: Adult Regular)   Pulse 90   Temp 96.8  F (36  C) (Temporal)   Resp 14   Ht 1.537 m (5' 0.5\")   Wt 78.5 kg (173 lb)   LMP  (LMP Unknown)   SpO2 99%   BMI 33.23 kg/m   Estimated body mass index is 33.23 kg/m  as calculated from the following:    Height as of this encounter: 1.537 m (5' 0.5\").    Weight as of this encounter: 78.5 kg (173 lb).    Medication Review: complete    The next two questions are to help us understand your food security.  If you are feeling you need any assistance in this area, we have resources available to support you today.          1/7/2025   SDOH- Food Insecurity   Within the past 12 months, did you worry that your food would run out before you got money to buy more? N   Within the past 12 months, did the food you bought just not last and you didn t have money to get more? N         Health Care Directive:  Patient does not have a Health Care Directive: Discussed advance care planning with patient; however, patient declined at this time.    Sarah Andrade LPN      "

## 2025-06-30 DIAGNOSIS — F41.1 ANXIETY STATE: ICD-10-CM

## 2025-06-30 DIAGNOSIS — R41.840 ATTENTION AND CONCENTRATION DEFICIT: ICD-10-CM

## 2025-07-05 RX ORDER — LISDEXAMFETAMINE DIMESYLATE 60 MG/1
60 CAPSULE ORAL DAILY
Qty: 30 CAPSULE | Refills: 0 | OUTPATIENT
Start: 2025-07-05 | End: 2025-08-04

## 2025-07-07 ENCOUNTER — MYC REFILL (OUTPATIENT)
Dept: FAMILY MEDICINE | Facility: OTHER | Age: 50
End: 2025-07-07
Payer: COMMERCIAL

## 2025-07-07 ENCOUNTER — MYC MEDICAL ADVICE (OUTPATIENT)
Dept: FAMILY MEDICINE | Facility: OTHER | Age: 50
End: 2025-07-07
Payer: COMMERCIAL

## 2025-07-07 DIAGNOSIS — R41.840 ATTENTION AND CONCENTRATION DEFICIT: ICD-10-CM

## 2025-07-07 DIAGNOSIS — F41.1 ANXIETY STATE: ICD-10-CM

## 2025-07-07 RX ORDER — LISDEXAMFETAMINE DIMESYLATE 60 MG/1
60 CAPSULE ORAL DAILY
Qty: 7 CAPSULE | Refills: 0 | Status: CANCELLED | OUTPATIENT
Start: 2025-07-07

## 2025-07-07 RX ORDER — LISDEXAMFETAMINE DIMESYLATE 60 MG/1
60 CAPSULE ORAL DAILY
Qty: 30 CAPSULE | Refills: 0 | Status: CANCELLED | OUTPATIENT
Start: 2025-07-07

## 2025-07-07 RX ORDER — LISDEXAMFETAMINE DIMESYLATE 60 MG/1
60 CAPSULE ORAL DAILY
Qty: 30 CAPSULE | Refills: 0 | OUTPATIENT
Start: 2025-07-07

## 2025-07-07 NOTE — TELEPHONE ENCOUNTER
Out of Vyvanse and didn't realize she had to schedule a 3 month follow up. Called and got scheduled for 8/12. Would you be willing to send in an Rx until her appt?     If so, ananda'd up orders for a whole month and one for 7 days.     LOV 4/7/25    Routing to provider to review and respond.  Yamel Stearns RN on 7/7/2025 at 2:35 PM

## 2025-07-07 NOTE — TELEPHONE ENCOUNTER
Pt notified of need for appointment, via [employer]. James Bradshaw RN .............. 7/7/2025  3:42 PM

## 2025-07-07 NOTE — TELEPHONE ENCOUNTER
Patient has been on a contract and part of the contract is to be seen for each refill.  She could consider seeing another provider with sooner access.   Kari Alvarado MD

## 2025-07-07 NOTE — TELEPHONE ENCOUNTER
Pt is out of medication!    Requested Prescriptions   Pending Prescriptions Disp Refills    lisdexamfetamine (VYVANSE) 60 MG capsule 30 capsule 0     Sig: Take 1 capsule (60 mg) by mouth daily.       Rx Protocol Controlled Substance Failed - 7/7/2025  2:41 PM        Failed - Auto Fail - Please forward to Provider     Last Prescription Date:   6/6/25  Last Fill Qty/Refills:         30, R-0 (End: 7/6/25)    Last Office Visit:              4/7/25   Future Office visit:             Next 5 appointments (look out 90 days)      Aug 12, 2025 11:40 AM  (Arrive by 11:25 AM)  Provider Visit with Kari Merino MD  [facility] ([facility]) [address]  [place] [phone]     Per LOV note:  Return in about 3 months (around 7/7/2025).     Unable to complete prescription refill per RN Medication Refill Policy. Jayla Noland, Refill RN .............. 7/7/2025  2:52 PM

## 2025-07-07 NOTE — TELEPHONE ENCOUNTER
Refused with note to pharmacy- needs appointment. Jayla Noland, Refill RN .............. 7/7/2025  8:53 AM

## 2025-07-14 ENCOUNTER — PATIENT OUTREACH (OUTPATIENT)
Dept: CARE COORDINATION | Facility: CLINIC | Age: 50
End: 2025-07-14
Payer: COMMERCIAL

## 2025-08-12 ENCOUNTER — OFFICE VISIT (OUTPATIENT)
Dept: FAMILY MEDICINE | Facility: OTHER | Age: 50
End: 2025-08-12
Attending: FAMILY MEDICINE
Payer: COMMERCIAL

## 2025-08-12 VITALS
OXYGEN SATURATION: 98 % | WEIGHT: 170.6 LBS | BODY MASS INDEX: 32.77 KG/M2 | SYSTOLIC BLOOD PRESSURE: 130 MMHG | RESPIRATION RATE: 16 BRPM | DIASTOLIC BLOOD PRESSURE: 70 MMHG | TEMPERATURE: 97.3 F | HEART RATE: 76 BPM

## 2025-08-12 DIAGNOSIS — F17.200 NICOTINE USE DISORDER: ICD-10-CM

## 2025-08-12 DIAGNOSIS — Z12.31 BREAST CANCER SCREENING BY MAMMOGRAM: ICD-10-CM

## 2025-08-12 DIAGNOSIS — F41.1 ANXIETY STATE: ICD-10-CM

## 2025-08-12 DIAGNOSIS — R41.840 ATTENTION AND CONCENTRATION DEFICIT: Primary | ICD-10-CM

## 2025-08-12 LAB
AMPHETAMINES UR QL SCN: ABNORMAL
BARBITURATES UR QL SCN: ABNORMAL
BENZODIAZ UR QL SCN: ABNORMAL
BZE UR QL SCN: ABNORMAL
CANNABINOIDS UR QL SCN: ABNORMAL
CREAT UR-MCNC: 74 MG/DL
FENTANYL UR QL: ABNORMAL
OPIATES UR QL SCN: ABNORMAL
PCP QUAL URINE (ROCHE): ABNORMAL

## 2025-08-12 PROCEDURE — 80307 DRUG TEST PRSMV CHEM ANLYZR: CPT | Mod: ZL | Performed by: FAMILY MEDICINE

## 2025-08-12 PROCEDURE — 80359 METHYLENEDIOXYAMPHETAMINES: CPT | Mod: ZL | Performed by: FAMILY MEDICINE

## 2025-08-12 RX ORDER — LISDEXAMFETAMINE DIMESYLATE 60 MG/1
60 CAPSULE ORAL DAILY
Qty: 30 CAPSULE | Refills: 0 | Status: SHIPPED | OUTPATIENT
Start: 2025-08-12 | End: 2025-09-11

## 2025-08-12 RX ORDER — LISDEXAMFETAMINE DIMESYLATE 60 MG/1
60 CAPSULE ORAL DAILY
Qty: 30 CAPSULE | Refills: 0 | Status: CANCELLED | OUTPATIENT
Start: 2025-08-12

## 2025-08-12 RX ORDER — LISDEXAMFETAMINE DIMESYLATE 60 MG/1
60 CAPSULE ORAL DAILY
Qty: 30 CAPSULE | Refills: 0 | Status: SHIPPED | OUTPATIENT
Start: 2025-10-11 | End: 2025-11-10

## 2025-08-12 RX ORDER — LISDEXAMFETAMINE DIMESYLATE 60 MG/1
60 CAPSULE ORAL DAILY
Qty: 30 CAPSULE | Refills: 0 | Status: SHIPPED | OUTPATIENT
Start: 2025-09-11 | End: 2025-10-11

## 2025-08-12 ASSESSMENT — ANXIETY QUESTIONNAIRES
GAD7 TOTAL SCORE: 18
8. IF YOU CHECKED OFF ANY PROBLEMS, HOW DIFFICULT HAVE THESE MADE IT FOR YOU TO DO YOUR WORK, TAKE CARE OF THINGS AT HOME, OR GET ALONG WITH OTHER PEOPLE?: EXTREMELY DIFFICULT
7. FEELING AFRAID AS IF SOMETHING AWFUL MIGHT HAPPEN: MORE THAN HALF THE DAYS
3. WORRYING TOO MUCH ABOUT DIFFERENT THINGS: MORE THAN HALF THE DAYS
4. TROUBLE RELAXING: NEARLY EVERY DAY
GAD7 TOTAL SCORE: 18
7. FEELING AFRAID AS IF SOMETHING AWFUL MIGHT HAPPEN: MORE THAN HALF THE DAYS
5. BEING SO RESTLESS THAT IT IS HARD TO SIT STILL: NEARLY EVERY DAY
IF YOU CHECKED OFF ANY PROBLEMS ON THIS QUESTIONNAIRE, HOW DIFFICULT HAVE THESE PROBLEMS MADE IT FOR YOU TO DO YOUR WORK, TAKE CARE OF THINGS AT HOME, OR GET ALONG WITH OTHER PEOPLE: EXTREMELY DIFFICULT
6. BECOMING EASILY ANNOYED OR IRRITABLE: NEARLY EVERY DAY
1. FEELING NERVOUS, ANXIOUS, OR ON EDGE: NEARLY EVERY DAY
2. NOT BEING ABLE TO STOP OR CONTROL WORRYING: MORE THAN HALF THE DAYS
GAD7 TOTAL SCORE: 18

## 2025-08-12 ASSESSMENT — PATIENT HEALTH QUESTIONNAIRE - PHQ9
10. IF YOU CHECKED OFF ANY PROBLEMS, HOW DIFFICULT HAVE THESE PROBLEMS MADE IT FOR YOU TO DO YOUR WORK, TAKE CARE OF THINGS AT HOME, OR GET ALONG WITH OTHER PEOPLE: EXTREMELY DIFFICULT
SUM OF ALL RESPONSES TO PHQ QUESTIONS 1-9: 13
SUM OF ALL RESPONSES TO PHQ QUESTIONS 1-9: 13

## 2025-08-12 ASSESSMENT — PAIN SCALES - GENERAL: PAINLEVEL_OUTOF10: NO PAIN (0)

## 2025-08-16 LAB
AMPHET UR CFM-MCNC: 149 NG/ML
AMPHET UR-MCNC: 125 NG/ML
AMPHET/CREAT UR: 201 NG/MG {CREAT}
GABAPENTIN UR QL CFM: PRESENT
MDA UR-MCNC: <200 NG/ML
MDEA UR-MCNC: <200 NG/ML
MDMA UR-MCNC: <200 NG/ML
METHAMPHET UR-MCNC: <200 NG/ML
PHENTERMINE UR CFM-MCNC: <200 NG/ML

## (undated) DEVICE — SU SILK 0 SH 30" K834H

## (undated) DEVICE — ENDO KIT COMPLIANCE DYKENDOCMPLY

## (undated) DEVICE — BLANKET BAIR HUGGER LOWER BODY 42568

## (undated) DEVICE — SUCTION TIP YANKAUER W/O VENT K86

## (undated) DEVICE — TUBING INSUFFLATOR W/FILTER OLYMPUS WA95005A

## (undated) DEVICE — SU CHROMIC 4-0 RB-1 27" U203H

## (undated) DEVICE — GLOVE PROTEXIS POWDER FREE SMT 7.5  2D72PT75X

## (undated) DEVICE — ENDO BRUSH CHANNEL MASTER CLEANING 2-4.2MM BW-412T

## (undated) DEVICE — BIN-ENT BIN BN07

## (undated) DEVICE — PREP CHLORAPREP 26ML TINTED ORANGE  260815

## (undated) DEVICE — CANISTER SUCTION MEDI-VAC GUARDIAN 2000ML 90D 65651-220

## (undated) DEVICE — SOL WATER 1500ML

## (undated) DEVICE — ESU LIGASURE MARYLAND JAW OPEN SEALER/DVDR 5MMX37CM LF1737

## (undated) DEVICE — ENDO TROCAR FIRST ENTRY KII FIOS Z-THRD 12X100MM CTF73

## (undated) DEVICE — SU VICRYL 3-0 SH 27" J784G

## (undated) DEVICE — SLEEVE SCD EXPRESS KNEE LENGTH MED 9529

## (undated) DEVICE — DRSG MEDIPORE 2X2 3/4" 3562

## (undated) DEVICE — SOL NACL 0.9% IRRIG 1000ML BOTTLE 2F7124

## (undated) DEVICE — SOL WATER IRRIG 1000ML BOTTLE 2F7114

## (undated) DEVICE — PACK LAPAROSCOPY LF SBA15LPFCA

## (undated) DEVICE — ESU GROUND PAD ADULT W/CORD E7507

## (undated) DEVICE — TUBING SUCTION 10'X3/16" N510

## (undated) DEVICE — SU MONOCRYL 4-0 PS-2 27" UND Y426H

## (undated) DEVICE — ESU PENCIL W/SMOKE EVAC CVPLP2000

## (undated) DEVICE — INFLATION DEVICE-SINUS BALLOON CATH

## (undated) DEVICE — DRSG STERI STRIP 1/2X4" R1547

## (undated) DEVICE — LABEL STERILE PREPRINTED FOR OR FRRH01-2M

## (undated) DEVICE — SOL NACL 0.9% INJ 1000ML BAG 2B1324X

## (undated) DEVICE — PACK BASIN SET UP SUTCNBSBBA

## (undated) DEVICE — COVER LT HANDLE 2/PK 5160-2FG

## (undated) DEVICE — INFLATION DEVICE-BID30

## (undated) DEVICE — COVER LIGHT HANDLE LT-F02

## (undated) DEVICE — ENDO TROCAR SLEEVE KII 5X100MM CTR03

## (undated) DEVICE — STPL POWERED ECHELON LONG 60MM PLEE60A

## (undated) DEVICE — SPONGE LAP 18X18" 23250-400

## (undated) DEVICE — ENDO SNARE EXACTO COLD 9MM LOOP 2.4MMX230CM 00711115

## (undated) DEVICE — SUCTION STRYKERFLOW II 250-070-500

## (undated) DEVICE — CATH TRAY FOLEY SURESTEP 16FR W/URINE MTR STATLK LF A303416A

## (undated) DEVICE — SLEEVE COMPRESSION SCD KNEE MED 74022

## (undated) DEVICE — NASAL DILATION SYSTEM-RELIEVA TRACT 16X40MM

## (undated) DEVICE — GLOVE BIOGEL 6.5 LATEX

## (undated) DEVICE — ENDO FORCEP ENDOJAW BIOPSY 2.8MMX230CM FB-220U

## (undated) DEVICE — ENDO TROCAR SLEEVE KII Z-THREADED 05X100MM CTS02

## (undated) DEVICE — SUCTION MANIFOLD NEPTUNE 2 SYS 4 PORT 0702-020-000

## (undated) DEVICE — STPL RELOAD REG TISSUE ECHELON 60 X 3.6MM BLUE GST60B

## (undated) DEVICE — VERRES NEEDLE 120MM DISPOSABLE 12/BX

## (undated) DEVICE — GLOVE BIOGEL INDICATOR 7.5 LF 41675

## (undated) DEVICE — BLADE 15 RB BK SS STRL LF DISPLF DISP 371215

## (undated) DEVICE — SU VICRYL 4-0 P-3 18" UND J494G

## (undated) DEVICE — PACK ENT CUSTOM SEN32ENMBI

## (undated) DEVICE — Device

## (undated) DEVICE — DRSG DRAIN 4X4" 7086

## (undated) RX ORDER — NEOSTIGMINE METHYLSULFATE 0.5 MG/ML
INJECTION INTRAVENOUS
Status: DISPENSED
Start: 2020-10-12

## (undated) RX ORDER — PROPOFOL 10 MG/ML
INJECTION, EMULSION INTRAVENOUS
Status: DISPENSED
Start: 2022-12-20

## (undated) RX ORDER — LIDOCAINE HYDROCHLORIDE 10 MG/ML
INJECTION, SOLUTION INFILTRATION; PERINEURAL
Status: DISPENSED
Start: 2024-08-22

## (undated) RX ORDER — FENTANYL CITRATE 50 UG/ML
INJECTION, SOLUTION INTRAMUSCULAR; INTRAVENOUS
Status: DISPENSED
Start: 2020-10-12

## (undated) RX ORDER — ESMOLOL HYDROCHLORIDE 10 MG/ML
INJECTION INTRAVENOUS
Status: DISPENSED
Start: 2020-10-12

## (undated) RX ORDER — HYDROMORPHONE HYDROCHLORIDE 1 MG/ML
INJECTION, SOLUTION INTRAMUSCULAR; INTRAVENOUS; SUBCUTANEOUS
Status: DISPENSED
Start: 2020-10-12

## (undated) RX ORDER — LIDOCAINE HYDROCHLORIDE AND EPINEPHRINE 10; 10 MG/ML; UG/ML
INJECTION, SOLUTION INFILTRATION; PERINEURAL
Status: DISPENSED
Start: 2024-08-22

## (undated) RX ORDER — ONDANSETRON 2 MG/ML
INJECTION INTRAMUSCULAR; INTRAVENOUS
Status: DISPENSED
Start: 2020-10-12

## (undated) RX ORDER — HYDROMORPHONE HYDROCHLORIDE 2 MG/ML
INJECTION, SOLUTION INTRAMUSCULAR; INTRAVENOUS; SUBCUTANEOUS
Status: DISPENSED
Start: 2020-10-12

## (undated) RX ORDER — PROPOFOL 10 MG/ML
INJECTION, EMULSION INTRAVENOUS
Status: DISPENSED
Start: 2020-10-12

## (undated) RX ORDER — CEFOTETAN AND DEXTROSE 1 G/50ML
INJECTION, SOLUTION INTRAVENOUS
Status: DISPENSED
Start: 2020-10-12

## (undated) RX ORDER — PROPOFOL 10 MG/ML
INJECTION, EMULSION INTRAVENOUS
Status: DISPENSED
Start: 2020-09-16

## (undated) RX ORDER — ACETAMINOPHEN 325 MG/1
TABLET ORAL
Status: DISPENSED
Start: 2020-10-12

## (undated) RX ORDER — LIDOCAINE HYDROCHLORIDE 20 MG/ML
INJECTION, SOLUTION EPIDURAL; INFILTRATION; INTRACAUDAL; PERINEURAL
Status: DISPENSED
Start: 2020-10-12

## (undated) RX ORDER — ONDANSETRON 2 MG/ML
INJECTION INTRAMUSCULAR; INTRAVENOUS
Status: DISPENSED
Start: 2022-12-20

## (undated) RX ORDER — PROPOFOL 10 MG/ML
INJECTION, EMULSION INTRAVENOUS
Status: DISPENSED
Start: 2023-10-26

## (undated) RX ORDER — KETOROLAC TROMETHAMINE 30 MG/ML
INJECTION, SOLUTION INTRAMUSCULAR; INTRAVENOUS
Status: DISPENSED
Start: 2020-10-12

## (undated) RX ORDER — VECURONIUM BROMIDE 1 MG/ML
INJECTION, POWDER, LYOPHILIZED, FOR SOLUTION INTRAVENOUS
Status: DISPENSED
Start: 2020-10-12

## (undated) RX ORDER — DEXAMETHASONE SODIUM PHOSPHATE 10 MG/ML
INJECTION, SOLUTION INTRAMUSCULAR; INTRAVENOUS
Status: DISPENSED
Start: 2022-12-20

## (undated) RX ORDER — MORPHINE SULFATE 0.5 MG/ML
INJECTION, SOLUTION EPIDURAL; INTRATHECAL; INTRAVENOUS
Status: DISPENSED
Start: 2020-10-12

## (undated) RX ORDER — GLYCOPYRROLATE 0.2 MG/ML
INJECTION, SOLUTION INTRAMUSCULAR; INTRAVENOUS
Status: DISPENSED
Start: 2020-10-12

## (undated) RX ORDER — BUPIVACAINE HYDROCHLORIDE AND EPINEPHRINE 5; 5 MG/ML; UG/ML
INJECTION, SOLUTION EPIDURAL; INTRACAUDAL; PERINEURAL
Status: DISPENSED
Start: 2020-10-12

## (undated) RX ORDER — DEXAMETHASONE SODIUM PHOSPHATE 4 MG/ML
INJECTION, SOLUTION INTRA-ARTICULAR; INTRALESIONAL; INTRAMUSCULAR; INTRAVENOUS; SOFT TISSUE
Status: DISPENSED
Start: 2020-10-12

## (undated) RX ORDER — FENTANYL CITRATE 50 UG/ML
INJECTION, SOLUTION INTRAMUSCULAR; INTRAVENOUS
Status: DISPENSED
Start: 2022-12-20